# Patient Record
Sex: MALE | Race: WHITE | Employment: OTHER | URBAN - METROPOLITAN AREA
[De-identification: names, ages, dates, MRNs, and addresses within clinical notes are randomized per-mention and may not be internally consistent; named-entity substitution may affect disease eponyms.]

---

## 2017-04-27 ENCOUNTER — PATIENT MESSAGE (OUTPATIENT)
Dept: INTERNAL MEDICINE CLINIC | Facility: CLINIC | Age: 57
End: 2017-04-27

## 2017-04-28 RX ORDER — LISINOPRIL 10 MG/1
10 TABLET ORAL DAILY
Qty: 30 TABLET | Refills: 3 | Status: SHIPPED | OUTPATIENT
Start: 2017-04-28 | End: 2017-10-04

## 2017-04-28 NOTE — TELEPHONE ENCOUNTER
Hypertensive Medications  Protocol Criteria:  · Appointment scheduled in the past 6 months or in the next 3 months  · BMP or CMP in the past 12 months  · Creatinine result < 2  Recent Visits       Provider Department Primary Dx    4 months ago Julius Bone

## 2017-04-28 NOTE — TELEPHONE ENCOUNTER
From: Shashi Query  To: Leslee Chavez MD  Sent: 4/27/2017 11:23 AM CDT  Subject: Prescription Question    HI. I am in need of a refill of Lisinopril 10mg.  its working well at [id number]

## 2017-10-04 ENCOUNTER — OFFICE VISIT (OUTPATIENT)
Dept: INTERNAL MEDICINE CLINIC | Facility: CLINIC | Age: 57
End: 2017-10-04

## 2017-10-04 VITALS
WEIGHT: 237 LBS | BODY MASS INDEX: 31.75 KG/M2 | HEART RATE: 56 BPM | TEMPERATURE: 98 F | SYSTOLIC BLOOD PRESSURE: 128 MMHG | DIASTOLIC BLOOD PRESSURE: 82 MMHG | HEIGHT: 72.5 IN

## 2017-10-04 DIAGNOSIS — E78.5 DYSLIPIDEMIA: ICD-10-CM

## 2017-10-04 DIAGNOSIS — Z00.00 ANNUAL PHYSICAL EXAM: Primary | ICD-10-CM

## 2017-10-04 DIAGNOSIS — I10 ESSENTIAL HYPERTENSION: ICD-10-CM

## 2017-10-04 DIAGNOSIS — K21.9 GASTROESOPHAGEAL REFLUX DISEASE, ESOPHAGITIS PRESENCE NOT SPECIFIED: ICD-10-CM

## 2017-10-04 DIAGNOSIS — D12.6 ADENOMATOUS POLYP OF COLON, UNSPECIFIED PART OF COLON: ICD-10-CM

## 2017-10-04 PROCEDURE — 90686 IIV4 VACC NO PRSV 0.5 ML IM: CPT | Performed by: INTERNAL MEDICINE

## 2017-10-04 PROCEDURE — 99396 PREV VISIT EST AGE 40-64: CPT | Performed by: INTERNAL MEDICINE

## 2017-10-04 PROCEDURE — 90472 IMMUNIZATION ADMIN EACH ADD: CPT | Performed by: INTERNAL MEDICINE

## 2017-10-04 PROCEDURE — 90471 IMMUNIZATION ADMIN: CPT | Performed by: INTERNAL MEDICINE

## 2017-10-04 PROCEDURE — 90715 TDAP VACCINE 7 YRS/> IM: CPT | Performed by: INTERNAL MEDICINE

## 2017-10-04 RX ORDER — LISINOPRIL 10 MG/1
10 TABLET ORAL DAILY
Qty: 30 TABLET | Refills: 5 | Status: SHIPPED | OUTPATIENT
Start: 2017-10-04 | End: 2018-02-23 | Stop reason: ALTCHOICE

## 2017-10-04 NOTE — H&P
Zayda Zavala is a 61year old male who presents for a complete physical exam, as well as for follow-up of hypertension and hypercholesterolemia. HPI:   He feels well today. No specific issues for discussion.   He does express interest in [facility] [place] Father    • Heart Disease Father      Angina age 76s; CABG age 80   • Lung Cancer [OTHER] Father    • Parkinson's Pleas Client Mother    • Parkinson's Pleas Client Maternal Aunt    • Breast Cancer Paternal Grandmother       Social History:  Smoking status: Never Smo vaccine today. Check CMP CBC lipid profile and screening PSA when able. Order sent. Continue current medications. Prescription refill for 6 months of lisinopril sent to pharmacy. Refer to GI for repeat colonoscopy. Referral completed.   He will schedu

## 2017-10-04 NOTE — PATIENT INSTRUCTIONS
You received a Tdap vaccine and a flu shot today. Please continue your current medications. Please obtain blood tests soon when you can. Please schedule a repeat colonoscopy with Dr. Russell Ortiz. Please try to eat healthy and exercise regularly.   Return visi

## 2017-10-31 ENCOUNTER — APPOINTMENT (OUTPATIENT)
Dept: LAB | Facility: HOSPITAL | Age: 57
End: 2017-10-31
Attending: INTERNAL MEDICINE
Payer: MEDICAID

## 2017-10-31 DIAGNOSIS — Z00.00 ANNUAL PHYSICAL EXAM: ICD-10-CM

## 2017-10-31 PROCEDURE — 80053 COMPREHEN METABOLIC PANEL: CPT

## 2017-10-31 PROCEDURE — 85027 COMPLETE CBC AUTOMATED: CPT

## 2017-10-31 PROCEDURE — 80061 LIPID PANEL: CPT

## 2017-10-31 PROCEDURE — 36415 COLL VENOUS BLD VENIPUNCTURE: CPT

## 2018-01-08 ENCOUNTER — TELEPHONE (OUTPATIENT)
Dept: GASTROENTEROLOGY | Facility: CLINIC | Age: 58
End: 2018-01-08

## 2018-01-08 NOTE — TELEPHONE ENCOUNTER
Pt called me back. Does have worsening heartburn/ reflux symptoms. Along with chronic cough.   Takes OTC Tagament daily with continued breakthrough symptoms    Okay to schedule colonoscopy and EGD together or did you want to see pt in office first? Please

## 2018-01-08 NOTE — TELEPHONE ENCOUNTER
Pt requesting to schedule EGD and CLN, pt informed about the 72 hr cb, pls call at:[phone],thanks.

## 2018-01-08 NOTE — TELEPHONE ENCOUNTER
LMTCB to discuss need for EGD. Is he having upper GI symptoms? Last Procedure:  5/13/2010  Last Diagnosis:  History of colon polyps  Recalled for (years): 5 years  Sedation used previously:  IV sedation  Last Prep Used (if known):   Moviprep  Anticoag removal.  Retroflexion was performed in the body of the stomach and the  more proximal stomach examined. The patient tolerated both procedures  well. ASA class was II. Findings as noted below. Colonoscopy.   1. Diverticulosis, extensive wide-mouthed d

## 2018-01-09 NOTE — TELEPHONE ENCOUNTER
Nursing: Just to clarify, the patient is or is not having reflux symptoms? If he is not having breakthrough symptoms with OTC Tagamet, then why was he requesting an EGD?

## 2018-01-09 NOTE — H&P
[address] [facility] [address] Gastroenterology                                                                                                  Clinic History and Physical     Pa melena. Last colonoscopy was performed by Dr. Nader Benítez in May 2010 along with the EGD. Findings indicated below. Patient's melena may have been possibly from an upper GI source (small duodenal ulcer) however no obvious etiology is forthcoming.   No evid Parkinson's Nelida Dsouza Mother    • Parkinson's Nelida Dsouza Maternal Aunt    • Breast Cancer Paternal Grandmother       Social History: Smoking status: Never Smoker                                                              Smokeless tobacco: Never Used perfused   Lung: effort normal and breath sounds normal, no respiratory distress, wheezes or rales  GI: + Mild epigastric/left upper quadrant tenderness to deep palpation, soft, obese non-tender exam in all other quadrants without rigidity or guarding, non modifications which she is also amenable to. Patient will follow up with any new or worsening symptoms. 2.  Screening for colon cancer/history of colon polyps: Patient's last colonoscopy was in 2010. He was due for a 5 year recall in 2015.   In light o with colonoscopy with intervention [i.e. polypectomy, stent placement, etc.] as indicated. Orders This Visit:  No orders of the defined types were placed in this encounter.       Meds This Visit:    Signed Prescriptions Disp Refills    Na Sulfate-K S

## 2018-01-09 NOTE — TELEPHONE ENCOUNTER
Spoke with pt and reviewed need for office visit prior to schedule    appt made tomorrow with Karel Kurtz for evaluation.

## 2018-01-09 NOTE — TELEPHONE ENCOUNTER
Since the patient is having upper GI symptoms I should probably see him in office rather than just scheduling an EGD. He may not necessarily need the EGD pending evaluation. We can schedule him for endoscopic procedure while he is in office at that time.

## 2018-01-10 ENCOUNTER — TELEPHONE (OUTPATIENT)
Dept: GASTROENTEROLOGY | Facility: CLINIC | Age: 58
End: 2018-01-10

## 2018-01-10 ENCOUNTER — OFFICE VISIT (OUTPATIENT)
Dept: GASTROENTEROLOGY | Facility: CLINIC | Age: 58
End: 2018-01-10

## 2018-01-10 VITALS
HEART RATE: 83 BPM | DIASTOLIC BLOOD PRESSURE: 100 MMHG | SYSTOLIC BLOOD PRESSURE: 150 MMHG | BODY MASS INDEX: 31.49 KG/M2 | WEIGHT: 237.63 LBS | HEIGHT: 73 IN

## 2018-01-10 DIAGNOSIS — R10.13 EPIGASTRIC PAIN: ICD-10-CM

## 2018-01-10 DIAGNOSIS — Z12.11 SCREENING FOR COLON CANCER: Primary | ICD-10-CM

## 2018-01-10 DIAGNOSIS — Z86.010 HISTORY OF COLON POLYPS: ICD-10-CM

## 2018-01-10 DIAGNOSIS — K21.9 GASTROESOPHAGEAL REFLUX DISEASE WITHOUT ESOPHAGITIS: ICD-10-CM

## 2018-01-10 DIAGNOSIS — R10.9 LEFT SIDED ABDOMINAL PAIN: ICD-10-CM

## 2018-01-10 PROCEDURE — 99214 OFFICE O/P EST MOD 30 MIN: CPT | Performed by: NURSE PRACTITIONER

## 2018-01-10 PROCEDURE — 99212 OFFICE O/P EST SF 10 MIN: CPT | Performed by: NURSE PRACTITIONER

## 2018-01-10 RX ORDER — NICOTINE POLACRILEX 4 MG/1
1 GUM, CHEWING ORAL DAILY
Qty: 30 TABLET | Refills: 5 | Status: SHIPPED | OUTPATIENT
Start: 2018-01-10 | End: 2020-11-20

## 2018-01-10 NOTE — PATIENT INSTRUCTIONS
1. Schedule colonoscopy with Dr. Kasia Stout with MAC    2.  bowel prep from pharmacy - split dose Suprep    3. Continue all medications for procedure     4. Read all bowel prep instructions carefully    5.  AVOID seeds, nuts, popcorn, raw fruits and veget

## 2018-01-10 NOTE — PROGRESS NOTES
no h/o head & neck cancers  no h/o difficult intubations  no h/o malignant hyperthermia  no psuedocholinesterase deficiencies  no h/o active illicit drug use/opioid abuse  no home O2  no h/o sleep apnea (might have it)  no active alcoholics   no dialysis

## 2018-01-10 NOTE — TELEPHONE ENCOUNTER
Scheduled for:  Colonoscopy 43321  Provider Name: Dr. Mason Burkett  Date:  3/5/18  Location:  [facility]  Sedation:  MAC  Time:  1:30 pm, arrival 12:30 pm  Prep: Suprep  Meds/Allergies Reconciled?:  SUSY Abrams reviewed  Diagnosis with codes:  Screening for colon c

## 2018-02-19 ENCOUNTER — TELEPHONE (OUTPATIENT)
Dept: GASTROENTEROLOGY | Facility: CLINIC | Age: 58
End: 2018-02-19

## 2018-02-19 NOTE — TELEPHONE ENCOUNTER
Current Outpatient Prescriptions:  Na Sulfate-K Sulfate-Mg Sulf (SUPREP BOWEL PREP KIT) 17.5-3.13-1.6 GM/180ML Oral Solution Take as directed per GI consult notes Disp: 1 Bottle Rfl: 0     Med not covered pls consider Alt.  pls advise

## 2018-02-19 NOTE — TELEPHONE ENCOUNTER
Spoke to Rhonda Church from pharmacy informed per Souleymane ROSALESN 1/10/18 Order ok to substitute with generic and okay to substitute Colyte/trilyte or equivalent as necessary. Verbalized understanding and had no further questions at this time.

## 2018-02-23 ENCOUNTER — OFFICE VISIT (OUTPATIENT)
Dept: INTERNAL MEDICINE CLINIC | Facility: CLINIC | Age: 58
End: 2018-02-23

## 2018-02-23 VITALS
BODY MASS INDEX: 32.07 KG/M2 | HEART RATE: 60 BPM | DIASTOLIC BLOOD PRESSURE: 84 MMHG | WEIGHT: 242 LBS | HEIGHT: 73 IN | SYSTOLIC BLOOD PRESSURE: 166 MMHG

## 2018-02-23 DIAGNOSIS — I10 ESSENTIAL HYPERTENSION: ICD-10-CM

## 2018-02-23 DIAGNOSIS — E78.5 DYSLIPIDEMIA: ICD-10-CM

## 2018-02-23 DIAGNOSIS — M77.12 LEFT LATERAL EPICONDYLITIS: Primary | ICD-10-CM

## 2018-02-23 PROCEDURE — 99213 OFFICE O/P EST LOW 20 MIN: CPT | Performed by: INTERNAL MEDICINE

## 2018-02-23 PROCEDURE — 99212 OFFICE O/P EST SF 10 MIN: CPT | Performed by: INTERNAL MEDICINE

## 2018-02-23 RX ORDER — LISINOPRIL AND HYDROCHLOROTHIAZIDE 20; 12.5 MG/1; MG/1
1 TABLET ORAL DAILY
Qty: 30 TABLET | Refills: 3 | Status: SHIPPED | OUTPATIENT
Start: 2018-02-23 | End: 2018-07-23

## 2018-02-23 RX ORDER — ATORVASTATIN CALCIUM 40 MG/1
40 TABLET, FILM COATED ORAL NIGHTLY
Qty: 30 TABLET | Refills: 3 | Status: SHIPPED | OUTPATIENT
Start: 2018-02-23 | End: 2018-08-15

## 2018-02-23 NOTE — PROGRESS NOTES
Keerthi Connell is a 61year old male.  Patient presents with:  Elbow Pain: c/o on/off left elbow pain since the end of November 2017    HPI:   Since this past November, he has had persistent intermittent pain in his left lateral elbow that typicall Smoker                                                              Smokeless tobacco: Never Used                      Alcohol use: Yes           0.0 oz/week     Comment: 6+ beers 3 days a week       EXAM:   GENERAL: Pleasant male appearing well in no dist

## 2018-02-23 NOTE — PATIENT INSTRUCTIONS
Please rest and apply ice to your left elbow 3 times daily. Call if no better. Stop lisinopril and begin lisinopril/HCTZ 20/12.5 mg 1 pill daily. Begin atorvastatin 40 mg 1 pill daily. Return visit in 1 month for a blood pressure check.

## 2018-03-05 ENCOUNTER — ANESTHESIA (OUTPATIENT)
Dept: ENDOSCOPY | Facility: HOSPITAL | Age: 58
End: 2018-03-05

## 2018-03-05 ENCOUNTER — HOSPITAL ENCOUNTER (OUTPATIENT)
Facility: HOSPITAL | Age: 58
Setting detail: HOSPITAL OUTPATIENT SURGERY
Discharge: HOME | End: 2018-03-05
Attending: INTERNAL MEDICINE | Admitting: INTERNAL MEDICINE
Payer: MEDICAID

## 2018-03-05 ENCOUNTER — SURGERY (OUTPATIENT)
Age: 58
End: 2018-03-05

## 2018-03-05 ENCOUNTER — ANESTHESIA EVENT (OUTPATIENT)
Dept: ENDOSCOPY | Facility: HOSPITAL | Age: 58
End: 2018-03-05

## 2018-03-05 VITALS
HEIGHT: 72 IN | WEIGHT: 235 LBS | BODY MASS INDEX: 31.83 KG/M2 | DIASTOLIC BLOOD PRESSURE: 90 MMHG | SYSTOLIC BLOOD PRESSURE: 124 MMHG | RESPIRATION RATE: 15 BRPM | OXYGEN SATURATION: 96 % | HEART RATE: 74 BPM

## 2018-03-05 DIAGNOSIS — Z86.010 HISTORY OF COLON POLYPS: ICD-10-CM

## 2018-03-05 DIAGNOSIS — Z12.11 SCREENING FOR COLON CANCER: ICD-10-CM

## 2018-03-05 DIAGNOSIS — K57.90 DIVERTICULOSIS: Primary | ICD-10-CM

## 2018-03-05 DIAGNOSIS — K64.9 HEMORRHOIDS: ICD-10-CM

## 2018-03-05 DIAGNOSIS — K63.5 COLON POLYPS: ICD-10-CM

## 2018-03-05 PROCEDURE — 0DBK8ZX EXCISION OF ASCENDING COLON, VIA NATURAL OR ARTIFICIAL OPENING ENDOSCOPIC, DIAGNOSTIC: ICD-10-PCS | Performed by: INTERNAL MEDICINE

## 2018-03-05 PROCEDURE — 45380 COLONOSCOPY AND BIOPSY: CPT | Performed by: INTERNAL MEDICINE

## 2018-03-05 PROCEDURE — 0DBL8ZX EXCISION OF TRANSVERSE COLON, VIA NATURAL OR ARTIFICIAL OPENING ENDOSCOPIC, DIAGNOSTIC: ICD-10-PCS | Performed by: INTERNAL MEDICINE

## 2018-03-05 PROCEDURE — 0DBN8ZX EXCISION OF SIGMOID COLON, VIA NATURAL OR ARTIFICIAL OPENING ENDOSCOPIC, DIAGNOSTIC: ICD-10-PCS | Performed by: INTERNAL MEDICINE

## 2018-03-05 RX ORDER — LIDOCAINE HYDROCHLORIDE 10 MG/ML
INJECTION, SOLUTION EPIDURAL; INFILTRATION; INTRACAUDAL; PERINEURAL AS NEEDED
Status: DISCONTINUED | OUTPATIENT
Start: 2018-03-05 | End: 2018-03-05 | Stop reason: SURG

## 2018-03-05 RX ORDER — SODIUM CHLORIDE, SODIUM LACTATE, POTASSIUM CHLORIDE, CALCIUM CHLORIDE 600; 310; 30; 20 MG/100ML; MG/100ML; MG/100ML; MG/100ML
INJECTION, SOLUTION INTRAVENOUS CONTINUOUS
Status: DISCONTINUED | OUTPATIENT
Start: 2018-03-05 | End: 2018-03-05

## 2018-03-05 RX ADMIN — SODIUM CHLORIDE, SODIUM LACTATE, POTASSIUM CHLORIDE, CALCIUM CHLORIDE: 600; 310; 30; 20 INJECTION, SOLUTION INTRAVENOUS at 14:45:00

## 2018-03-05 RX ADMIN — SODIUM CHLORIDE, SODIUM LACTATE, POTASSIUM CHLORIDE, CALCIUM CHLORIDE: 600; 310; 30; 20 INJECTION, SOLUTION INTRAVENOUS at 14:05:00

## 2018-03-05 RX ADMIN — LIDOCAINE HYDROCHLORIDE 50 MG: 10 INJECTION, SOLUTION EPIDURAL; INFILTRATION; INTRACAUDAL; PERINEURAL at 14:05:00

## 2018-03-05 NOTE — ANESTHESIA PREPROCEDURE EVALUATION
Anesthesia PreOp Note    HPI:     Niru Bustillos is a 61year old male who presents for preoperative consultation requested by: Jones Bosworth, MD    Date of Surgery: 3/5/2018    Procedure(s):  COLONOSCOPY  Indication: Screening for colon cancer Hives  Radiology Contrast *    Vida Donovan    Family History   Problem Relation Age of Onset   • Diabetes Father    • Hypertension Father    • Heart Disease Father      Angina age 76s; CABG age 80   • Lung Cancer [OTHER] Father    • Parkinson's [OT PM

## 2018-03-05 NOTE — OPERATIVE REPORT
[facility] HOSP - [facility] Endoscopy Report      Preoperative Diagnosis:  - history of colon polyps       Postoperative Diagnosis:  - colon polyps x 3  - diverticulosis  - internal hemorrhoids      Procedure:    Colonoscopy      Surgeon:  JEANIE Wetzel

## 2018-03-05 NOTE — ANESTHESIA POSTPROCEDURE EVALUATION
Patient: Edenilson Blood    Procedure Summary     Date:  03/05/18 Room / Location:  [address] [place] [address] ENDOSCOPY 01 / [address] [place] [address] ENDOSCOPY    Anesthesia Start:  6621 Anesthesia Stop:  8958    Procedure:  COLONOSCOPY (N/A ) Diagnosis:       Screening for colon cancer

## 2018-03-14 ENCOUNTER — TELEPHONE (OUTPATIENT)
Dept: GASTROENTEROLOGY | Facility: CLINIC | Age: 58
End: 2018-03-14

## 2018-03-14 NOTE — TELEPHONE ENCOUNTER
----- Message from Suha Doyle MD sent at 3/13/2018  5:58 PM CDT -----  I wanted to get back to you with your colonoscopy results. You had 3 colon polyps removed which were benign.   I would advise a repeat colonoscopy in 5 years to make sure no new p

## 2018-06-25 ENCOUNTER — TELEPHONE (OUTPATIENT)
Dept: GASTROENTEROLOGY | Facility: CLINIC | Age: 58
End: 2018-06-25

## 2018-06-25 NOTE — TELEPHONE ENCOUNTER
Needs confirmation on whether the below medication is tablet or capsule please call thomas [phone] select option 4 thank you     Current Outpatient Prescriptions:     •  Omeprazole 20 MG Oral Tab EC, Take 1 tablet by mouth daily. , Disp: 30 tablet, Rfl

## 2018-06-25 NOTE — TELEPHONE ENCOUNTER
Samira JAIN--I spoke to Nikita at [facility] has been taking Omeprazole capsules rather than tablets (your Ad rx). I ok'd them to use capsules for authorizing 20 mg daily.  Apparently they have the prior auth request. The issue is pt insurance

## 2018-06-25 NOTE — TELEPHONE ENCOUNTER
Current Outpatient Prescriptions:  Omeprazole 20 MG Oral Tab EC Take 1 tablet by mouth daily.  Disp: 30 tablet Rfl: 5     PA request pls call [phone] Pt ID# [id number] Max 120 day supply in 365 days

## 2018-06-26 NOTE — TELEPHONE ENCOUNTER
Nursing: We could try to appeal see if they would provide coverage. In the event they do not, omeprazole 20 mg daily is the equivalent of Prilosec OTC which the patient may obtain over-the-counter.   We may want to reach out to the patient to explain the s

## 2018-06-26 NOTE — TELEPHONE ENCOUNTER
Received fax from [employer] stating that Omeprazole was denied due to the allowed 120 caps in a 365 day period. Lauryn Ulloa- please advise. Can try to appeal if needed. Thank you.

## 2018-06-27 NOTE — TELEPHONE ENCOUNTER
I left voicemail on the listed home phone to call. Attempted the mobile number but an elderly woman answered, and said he was not there. Will review below with pt. If he does want an appeal, this can sometimes take weeks.  May pay out of pocket for Prilos

## 2018-06-28 NOTE — TELEPHONE ENCOUNTER
Pt contacted. He will just buy over the counter prilosec until next year, then we can start over with new rx at that time. Thanks, You may close encounter. Also sent to [facility] as FYI, if you have insurance paperwork, may send to scan. Thanks.

## 2018-07-23 RX ORDER — LISINOPRIL AND HYDROCHLOROTHIAZIDE 20; 12.5 MG/1; MG/1
1 TABLET ORAL DAILY
Qty: 90 TABLET | Refills: 0 | Status: SHIPPED | OUTPATIENT
Start: 2018-07-23 | End: 2018-11-04

## 2018-07-23 NOTE — TELEPHONE ENCOUNTER
Hypertensive Medications Protocol Passed7/23 1:45 PM   CMP or BMP in past 12 months    Serum Creatinine < 2.0    Appointment in past 6 or next 3 months     Medication refilled for 90 days per protocol.

## 2018-07-23 NOTE — TELEPHONE ENCOUNTER
From: Pamela Boydo  Sent: 7/23/2018 10:32 AM CDT  Subject: Medication Renewal Request    Jil Mehta.  Jose Rose would like a refill of the following medications:     Lisinopril-Hydrochlorothiazide 20-12.5 MG Oral Tab Esther Reyez MD]    Pre

## 2018-08-15 ENCOUNTER — OFFICE VISIT (OUTPATIENT)
Dept: INTERNAL MEDICINE CLINIC | Facility: CLINIC | Age: 58
End: 2018-08-15
Payer: MEDICAID

## 2018-08-15 ENCOUNTER — LAB ENCOUNTER (OUTPATIENT)
Dept: LAB | Facility: HOSPITAL | Age: 58
End: 2018-08-15
Attending: INTERNAL MEDICINE
Payer: MEDICAID

## 2018-08-15 VITALS
DIASTOLIC BLOOD PRESSURE: 76 MMHG | TEMPERATURE: 100 F | WEIGHT: 238 LBS | SYSTOLIC BLOOD PRESSURE: 124 MMHG | HEIGHT: 73 IN | BODY MASS INDEX: 31.54 KG/M2 | HEART RATE: 99 BPM

## 2018-08-15 DIAGNOSIS — R50.9 FEVER, UNSPECIFIED FEVER CAUSE: ICD-10-CM

## 2018-08-15 DIAGNOSIS — R50.9 FEVER, UNSPECIFIED FEVER CAUSE: Primary | ICD-10-CM

## 2018-08-15 LAB
BASOPHILS # BLD: 0 K/UL (ref 0–0.2)
BASOPHILS NFR BLD: 0 %
BILIRUB UR QL: NEGATIVE
CLARITY UR: CLEAR
COLOR UR: YELLOW
CRP SERPL-MCNC: 9.3 MG/DL (ref 0–0.9)
EOSINOPHIL # BLD: 0 K/UL (ref 0–0.7)
EOSINOPHIL NFR BLD: 0 %
ERYTHROCYTE [DISTWIDTH] IN BLOOD BY AUTOMATED COUNT: 12.2 % (ref 11–15)
ERYTHROCYTE [SEDIMENTATION RATE] IN BLOOD: 7 MM/HR (ref 0–20)
GLUCOSE UR-MCNC: NEGATIVE MG/DL
HCT VFR BLD AUTO: 50.8 % (ref 41–52)
HGB BLD-MCNC: 17.3 G/DL (ref 13.5–17.5)
HGB UR QL STRIP.AUTO: NEGATIVE
LEUKOCYTE ESTERASE UR QL STRIP.AUTO: NEGATIVE
LYMPHOCYTES # BLD: 1.1 K/UL (ref 1–4)
LYMPHOCYTES NFR BLD: 9 %
MCH RBC QN AUTO: 32.4 PG (ref 27–32)
MCHC RBC AUTO-ENTMCNC: 34.1 G/DL (ref 32–37)
MCV RBC AUTO: 95 FL (ref 80–100)
MONOCYTES # BLD: 1 K/UL (ref 0–1)
MONOCYTES NFR BLD: 9 %
NEUTROPHILS # BLD AUTO: 9.1 K/UL (ref 1.8–7.7)
NEUTROPHILS NFR BLD: 81 %
NITRITE UR QL STRIP.AUTO: NEGATIVE
PH UR: 6 [PH] (ref 5–8)
PLATELET # BLD AUTO: 156 K/UL (ref 140–400)
PMV BLD AUTO: 8.7 FL (ref 7.4–10.3)
PROT UR-MCNC: NEGATIVE MG/DL
RBC # BLD AUTO: 5.34 M/UL (ref 4.5–5.9)
SP GR UR STRIP: 1.02 (ref 1–1.03)
UROBILINOGEN UR STRIP-ACNC: 2
VIT C UR-MCNC: NEGATIVE MG/DL
WBC # BLD AUTO: 11.2 K/UL (ref 4–11)

## 2018-08-15 PROCEDURE — 99214 OFFICE O/P EST MOD 30 MIN: CPT | Performed by: INTERNAL MEDICINE

## 2018-08-15 PROCEDURE — 36415 COLL VENOUS BLD VENIPUNCTURE: CPT

## 2018-08-15 PROCEDURE — 99212 OFFICE O/P EST SF 10 MIN: CPT | Performed by: INTERNAL MEDICINE

## 2018-08-15 PROCEDURE — 81003 URINALYSIS AUTO W/O SCOPE: CPT

## 2018-08-15 PROCEDURE — 85652 RBC SED RATE AUTOMATED: CPT

## 2018-08-15 PROCEDURE — 85025 COMPLETE CBC W/AUTO DIFF WBC: CPT

## 2018-08-15 PROCEDURE — 86140 C-REACTIVE PROTEIN: CPT

## 2018-08-15 NOTE — PATIENT INSTRUCTIONS
Await results of today's blood and urine testing. Take Tylenol as needed for relief of achiness and fever. Call if symptoms do not continue to improve or resolve. Please schedule a physical in 2 months.

## 2018-08-15 NOTE — PROGRESS NOTES
De Ruba is a 60year old male. Patient presents with:  Fever  Low Back Pain    HPI:   On Sunday, 3 days ago, he went for a long bike ride. Afterward he felt some excessive fatigue and achiness.   Symptoms persisted, and yesterday he had a f REVIEW OF SYSTEMS:   GENERAL: No fever  URI: No nasal congestion, purulent nasal drainage, sinus pressure or pain, ear pain or sore throat  LUNGS: No cough wheezing or shortness of breath  CARDIAC: No lightheadedness palpitations or chest pain  GI: Occ to the plan. The patient is asked to return in 2 months.     Stewart Spears MD  8/15/2018  12:51 PM

## 2018-08-18 ENCOUNTER — OFFICE VISIT (OUTPATIENT)
Dept: INTERNAL MEDICINE CLINIC | Facility: CLINIC | Age: 58
End: 2018-08-18
Payer: MEDICAID

## 2018-08-18 VITALS
DIASTOLIC BLOOD PRESSURE: 87 MMHG | TEMPERATURE: 98 F | SYSTOLIC BLOOD PRESSURE: 127 MMHG | WEIGHT: 232 LBS | HEIGHT: 72.5 IN | BODY MASS INDEX: 31.08 KG/M2 | HEART RATE: 103 BPM

## 2018-08-18 DIAGNOSIS — R50.9 FEVER, UNSPECIFIED FEVER CAUSE: Primary | ICD-10-CM

## 2018-08-18 PROCEDURE — 99213 OFFICE O/P EST LOW 20 MIN: CPT | Performed by: INTERNAL MEDICINE

## 2018-08-18 PROCEDURE — 99212 OFFICE O/P EST SF 10 MIN: CPT | Performed by: INTERNAL MEDICINE

## 2018-08-18 NOTE — PROGRESS NOTES
Kimani Razo is a 60year old male.  Patient presents with:  Fever: started last monday  Headache: started last wednesday-pt has been taking tylenol reduces fever but does not help headache    HPI:   Mr. Ab Valentino presents this morning for fo Past Surgical History:  2010: COLONOSCOPY      Comment: 4th colon; Dr. Komal Snyder  No date: COLONOSCOPY  3/5/2018: COLONOSCOPY N/A      Comment: Procedure: COLONOSCOPY;  Surgeon: Dre Hernández MD;  Location: [address] [place] [address] ENDOSCOPY   Social History status. The patient indicates understanding of these issues and agrees to the plan. The patient is asked to return as needed.     Amna Molina MD  8/18/2018  11:15 AM

## 2018-08-18 NOTE — PATIENT INSTRUCTIONS
Please monitor symptoms very closely for the next 2-3 days. Go to the ER for evaluation of possible meningitis if fever, headache and/or neck stiffness recurs. Contact me through MyChart in 2-3 days for an update.

## 2018-11-05 RX ORDER — LISINOPRIL AND HYDROCHLOROTHIAZIDE 20; 12.5 MG/1; MG/1
1 TABLET ORAL DAILY
Qty: 90 TABLET | Refills: 0 | Status: SHIPPED | OUTPATIENT
Start: 2018-11-05 | End: 2019-02-05

## 2018-11-06 NOTE — TELEPHONE ENCOUNTER
Please contact patient and assist in scheduling appointment with Dr Sindy Hewitt for  physical exam.

## 2019-01-20 ENCOUNTER — HOSPITAL ENCOUNTER (INPATIENT)
Facility: HOSPITAL | Age: 59
LOS: 4 days | Discharge: HOME | DRG: 378 | End: 2019-01-24
Attending: EMERGENCY MEDICINE | Admitting: HOSPITALIST
Payer: MEDICAID

## 2019-01-20 DIAGNOSIS — K57.90 DIVERTICULOSIS: ICD-10-CM

## 2019-01-20 DIAGNOSIS — K64.9 HEMORRHOID: ICD-10-CM

## 2019-01-20 DIAGNOSIS — K92.1 MELENA: ICD-10-CM

## 2019-01-20 DIAGNOSIS — K92.2 ACUTE UPPER GASTROINTESTINAL BLEEDING: Primary | ICD-10-CM

## 2019-01-20 LAB
ANION GAP SERPL CALC-SCNC: 15 MMOL/L (ref 0–18)
ANTIBODY SCREEN: NEGATIVE
BASOPHILS # BLD: 0 K/UL (ref 0–0.2)
BASOPHILS NFR BLD: 0 %
BUN SERPL-MCNC: 19 MG/DL (ref 8–20)
BUN/CREAT SERPL: 22.1 (ref 10–20)
CALCIUM SERPL-MCNC: 9.5 MG/DL (ref 8.5–10.5)
CHLORIDE SERPL-SCNC: 93 MMOL/L (ref 95–110)
CO2 SERPL-SCNC: 25 MMOL/L (ref 22–32)
CREAT SERPL-MCNC: 0.86 MG/DL (ref 0.5–1.5)
EOSINOPHIL # BLD: 0.1 K/UL (ref 0–0.7)
EOSINOPHIL NFR BLD: 1 %
ERYTHROCYTE [DISTWIDTH] IN BLOOD BY AUTOMATED COUNT: 12.4 % (ref 11–15)
GLUCOSE SERPL-MCNC: 128 MG/DL (ref 70–99)
HCT VFR BLD AUTO: 44.8 % (ref 41–52)
HGB BLD-MCNC: 15.8 G/DL (ref 13.5–17.5)
LYMPHOCYTES # BLD: 1.7 K/UL (ref 1–4)
LYMPHOCYTES NFR BLD: 16 %
MCH RBC QN AUTO: 32.6 PG (ref 27–32)
MCHC RBC AUTO-ENTMCNC: 35.2 G/DL (ref 32–37)
MCV RBC AUTO: 92.7 FL (ref 80–100)
MONOCYTES # BLD: 0.7 K/UL (ref 0–1)
MONOCYTES NFR BLD: 6 %
NEUTROPHILS # BLD AUTO: 8.3 K/UL (ref 1.8–7.7)
NEUTROPHILS NFR BLD: 77 %
OSMOLALITY UR CALC.SUM OF ELEC: 280 MOSM/KG (ref 275–295)
PLATELET # BLD AUTO: 247 K/UL (ref 140–400)
PMV BLD AUTO: 8.9 FL (ref 7.4–10.3)
POTASSIUM SERPL-SCNC: 4.4 MMOL/L (ref 3.3–5.1)
RBC # BLD AUTO: 4.83 M/UL (ref 4.5–5.9)
RH BLOOD TYPE: POSITIVE
SODIUM SERPL-SCNC: 133 MMOL/L (ref 136–144)
WBC # BLD AUTO: 10.7 K/UL (ref 4–11)

## 2019-01-20 PROCEDURE — 99222 1ST HOSP IP/OBS MODERATE 55: CPT | Performed by: HOSPITALIST

## 2019-01-20 PROCEDURE — 99223 1ST HOSP IP/OBS HIGH 75: CPT | Performed by: INTERNAL MEDICINE

## 2019-01-20 RX ORDER — HYDRALAZINE HYDROCHLORIDE 20 MG/ML
10 INJECTION INTRAMUSCULAR; INTRAVENOUS EVERY 4 HOURS PRN
Status: DISCONTINUED | OUTPATIENT
Start: 2019-01-20 | End: 2019-01-24

## 2019-01-20 RX ORDER — LORAZEPAM 2 MG/ML
1 INJECTION INTRAMUSCULAR NIGHTLY PRN
Status: DISCONTINUED | OUTPATIENT
Start: 2019-01-20 | End: 2019-01-24

## 2019-01-20 RX ORDER — 0.9 % SODIUM CHLORIDE 0.9 %
3 VIAL (ML) INJECTION AS NEEDED
Status: DISCONTINUED | OUTPATIENT
Start: 2019-01-20 | End: 2019-01-24

## 2019-01-20 RX ORDER — ZOLPIDEM TARTRATE 5 MG/1
5 TABLET ORAL NIGHTLY PRN
Status: DISCONTINUED | OUTPATIENT
Start: 2019-01-20 | End: 2019-01-24

## 2019-01-20 RX ORDER — SODIUM CHLORIDE 9 MG/ML
INJECTION, SOLUTION INTRAVENOUS CONTINUOUS
Status: DISCONTINUED | OUTPATIENT
Start: 2019-01-20 | End: 2019-01-24

## 2019-01-20 RX ORDER — LORAZEPAM 2 MG/ML
1 INJECTION INTRAMUSCULAR
Status: DISCONTINUED | OUTPATIENT
Start: 2019-01-20 | End: 2019-01-20

## 2019-01-20 RX ORDER — ONDANSETRON 2 MG/ML
4 INJECTION INTRAMUSCULAR; INTRAVENOUS EVERY 6 HOURS PRN
Status: DISCONTINUED | OUTPATIENT
Start: 2019-01-20 | End: 2019-01-24

## 2019-01-20 NOTE — ED NOTES
The patient reports formed, black and tarry stools for 3 days with fatigue and intermittent lightheadedness, denying shortness of breath or chest pain.

## 2019-01-20 NOTE — ED INITIAL ASSESSMENT (HPI)
Patient presents to ER with c/o dark stool twice a day for the last three days. Patient takes ASA - stopped on Thursday.

## 2019-01-21 ENCOUNTER — ANESTHESIA EVENT (OUTPATIENT)
Dept: ENDOSCOPY | Facility: HOSPITAL | Age: 59
DRG: 378 | End: 2019-01-21
Payer: MEDICAID

## 2019-01-21 ENCOUNTER — ANESTHESIA (OUTPATIENT)
Dept: ENDOSCOPY | Facility: HOSPITAL | Age: 59
DRG: 378 | End: 2019-01-21
Payer: MEDICAID

## 2019-01-21 LAB
ANION GAP SERPL CALC-SCNC: 11 MMOL/L (ref 0–18)
BASOPHILS # BLD: 0 K/UL (ref 0–0.2)
BASOPHILS NFR BLD: 0 %
BUN SERPL-MCNC: 16 MG/DL (ref 8–20)
BUN/CREAT SERPL: 19.5 (ref 10–20)
CALCIUM SERPL-MCNC: 8.4 MG/DL (ref 8.5–10.5)
CHLORIDE SERPL-SCNC: 97 MMOL/L (ref 95–110)
CO2 SERPL-SCNC: 25 MMOL/L (ref 22–32)
CREAT SERPL-MCNC: 0.82 MG/DL (ref 0.5–1.5)
EOSINOPHIL # BLD: 0.2 K/UL (ref 0–0.7)
EOSINOPHIL NFR BLD: 2 %
ERYTHROCYTE [DISTWIDTH] IN BLOOD BY AUTOMATED COUNT: 12.4 % (ref 11–15)
GLUCOSE SERPL-MCNC: 111 MG/DL (ref 70–99)
HCT VFR BLD AUTO: 35.9 % (ref 41–52)
HCT VFR BLD AUTO: 36.5 % (ref 41–52)
HGB BLD-MCNC: 12.6 G/DL (ref 13.5–17.5)
HGB BLD-MCNC: 12.9 G/DL (ref 13.5–17.5)
LYMPHOCYTES # BLD: 2.2 K/UL (ref 1–4)
LYMPHOCYTES NFR BLD: 29 %
MCH RBC QN AUTO: 32.8 PG (ref 27–32)
MCHC RBC AUTO-ENTMCNC: 35.3 G/DL (ref 32–37)
MCV RBC AUTO: 92.9 FL (ref 80–100)
MONOCYTES # BLD: 0.7 K/UL (ref 0–1)
MONOCYTES NFR BLD: 9 %
NEUTROPHILS # BLD AUTO: 4.5 K/UL (ref 1.8–7.7)
NEUTROPHILS NFR BLD: 60 %
OSMOLALITY UR CALC.SUM OF ELEC: 278 MOSM/KG (ref 275–295)
PLATELET # BLD AUTO: 175 K/UL (ref 140–400)
PMV BLD AUTO: 8.7 FL (ref 7.4–10.3)
POTASSIUM SERPL-SCNC: 4.6 MMOL/L (ref 3.3–5.1)
RBC # BLD AUTO: 3.93 M/UL (ref 4.5–5.9)
SODIUM SERPL-SCNC: 133 MMOL/L (ref 136–144)
WBC # BLD AUTO: 7.6 K/UL (ref 4–11)

## 2019-01-21 PROCEDURE — 99233 SBSQ HOSP IP/OBS HIGH 50: CPT | Performed by: HOSPITALIST

## 2019-01-21 PROCEDURE — 0DB68ZX EXCISION OF STOMACH, VIA NATURAL OR ARTIFICIAL OPENING ENDOSCOPIC, DIAGNOSTIC: ICD-10-PCS | Performed by: INTERNAL MEDICINE

## 2019-01-21 PROCEDURE — 43239 EGD BIOPSY SINGLE/MULTIPLE: CPT | Performed by: INTERNAL MEDICINE

## 2019-01-21 RX ORDER — SODIUM CHLORIDE, SODIUM LACTATE, POTASSIUM CHLORIDE, CALCIUM CHLORIDE 600; 310; 30; 20 MG/100ML; MG/100ML; MG/100ML; MG/100ML
INJECTION, SOLUTION INTRAVENOUS CONTINUOUS
Status: DISCONTINUED | OUTPATIENT
Start: 2019-01-21 | End: 2019-01-24

## 2019-01-21 RX ORDER — SODIUM CHLORIDE, SODIUM LACTATE, POTASSIUM CHLORIDE, CALCIUM CHLORIDE 600; 310; 30; 20 MG/100ML; MG/100ML; MG/100ML; MG/100ML
INJECTION, SOLUTION INTRAVENOUS CONTINUOUS PRN
Status: DISCONTINUED | OUTPATIENT
Start: 2019-01-21 | End: 2019-01-21

## 2019-01-21 RX ORDER — NALOXONE HYDROCHLORIDE 0.4 MG/ML
80 INJECTION, SOLUTION INTRAMUSCULAR; INTRAVENOUS; SUBCUTANEOUS AS NEEDED
Status: DISCONTINUED | OUTPATIENT
Start: 2019-01-21 | End: 2019-01-21 | Stop reason: HOSPADM

## 2019-01-21 RX ORDER — PANTOPRAZOLE SODIUM 40 MG/1
40 TABLET, DELAYED RELEASE ORAL
Status: DISCONTINUED | OUTPATIENT
Start: 2019-01-22 | End: 2019-01-24

## 2019-01-21 RX ADMIN — SODIUM CHLORIDE: 9 INJECTION, SOLUTION INTRAVENOUS at 10:15:00

## 2019-01-21 NOTE — ED PROVIDER NOTES
Patient Seen in: [facility] AND [facility] Emergency Department    History   Patient presents with:  GI Bleeding (gastrointestinal)    Stated Complaint: dark stool x 3 days    HPI    14-year-old male presents for complaint of black tarry stools for the past 3 da above.    Physical Exam     ED Triage Vitals [01/20/19 1626]   /83   Pulse 115   Resp 18   Temp 97 °F (36.1 °C)   Temp src Temporal   SpO2 98 %   O2 Device None (Room air)       Current:/89   Pulse 93   Temp 97 °F (36.1 °C) (Temporal)   Resp 20 on the individual orders. TYPE AND SCREEN   RAINBOW DRAW BLUE   RAINBOW DRAW LAVENDER   RAINBOW DRAW DARK GREEN   RAINBOW DRAW LIGHT GREEN   RAINBOW DRAW GOLD   RAINBOW DRAW LAVENDER TALL (BNP)                MDM    Patient is Hemoccult positive.   Stool

## 2019-01-21 NOTE — CONSULTS
[facility] AND CLINICS  Report of Consultation    Ariel Lezama Patient Status:  Inpatient    1960 MRN [id number]   Location [facility]5W Attending Smita Mondragon MD   Hosp Day # 0 PCP Gla Anders MD     Reason for Consultation:  - tab 5 mg, 5 mg, Oral, Nightly PRN  •  [START ON 1/21/2019] Pantoprazole Sodium (PROTONIX) 40 mg in Sodium Chloride 0.9 % 10 mL IV push, 40 mg, Intravenous, Q24H  •  LORazepam (ATIVAN) injection 1 mg, 1 mg, Intravenous, Nightly PRN    Physical Exam:  Blood

## 2019-01-21 NOTE — H&P
Crystal Patient Status:  Emergency    1960 MRN [id number]   Location [address] Attending [facility] Day # 0 PCP Esteban Segura MD Breast Cancer Paternal Grandmother       reports that  has never smoked. he has never used smokeless tobacco. He reports that he drinks alcohol. He reports that he does not use drugs.     Allergies:    Amoxicillin             HIVES  Radiology Contrast * edema. Gastrointestinal:  Soft, non-tender, non-distended, normal bowel sounds, no organomegaly. Lymphatics:  No lymphadenopathy neck, axilla, groin. Musculoskeletal: Normal range of motion. normal strength. Feet:  Normal pulses.   Neurologic:  Alert,

## 2019-01-21 NOTE — ANESTHESIA PREPROCEDURE EVALUATION
Anesthesia PreOp Note    HPI:     Cristin Gilliland is a 60year old male who presents for preoperative consultation requested by: Rere Downing MD    Date of Surgery: 1/20/2019 - 1/21/2019    Procedure(s):  ESOPHAGOGASTRODUODENOSCOPY (EGD)  Latonya Facility-Administered Medications Ordered in Epic:  ondansetron HCl (ZOFRAN) injection 4 mg 4 mg Intravenous Q6H PRN Estrellita Rasmussen MD    hydrALAzine HCl (APRESOLINE) injection 10 mg 10 mg Intravenous Q4H PRN Estrellita Rasmussen MD    0.9%  NaCl infusion  I activity: Not on file    Other Topics      Concerns:         Service: Not Asked        Blood Transfusions: Not Asked        Caffeine Concern: Yes          coffee, 2cups/day        Occupational Exposure: Not Asked        Hobby Hazards: Not Asked GI/Hepatic/Renal    (+) GERD,     Endo/Other    Abdominal  - normal exam  (+) obese,              Anesthesia Plan:   ASA:  3  Plan:   MAC  Informed Consent Plan and Risks Discussed With:  Patient      I have informed Glendale Darter and/or legal gu

## 2019-01-21 NOTE — ED NOTES
Orders for admission received from MD, and is safe for transfer to floor. Care will be assumed by [facility] per policy. Patient is aware of plan and ready to go upstairs. If there are any questions or issues, please call JM Adler  at extension [phone].  Hunter Bonner

## 2019-01-21 NOTE — H&P
History & Physical Examination    Patient Name: Victor M Carnes  MRN: [id number]  [facility]: [id number]  YOB: 1960    Diagnosis: melena and epigastric abdominal pain    Medications Prior to Admission:  Lisinopril-Hydrochlorothiazide 20-12.5 Father         Angina age 76s; CABG age 80   • Other (Lung Cancer) Father    • Other (Parkinson's) Mother    • Other (Parkinson's) Maternal Aunt    • Breast Cancer Paternal Grandmother      Social History    Tobacco Use      Smoking status: Never Smoker

## 2019-01-21 NOTE — OPERATIVE REPORT
[facility] - [facility] Endoscopy Report      Preoperative Diagnosis:  - black stools  - epigastric abdominal pain      Postoperative Diagnosis:  - mild gastritis   - gastric polyps ( fundic gland )  - small hiatal hernia      Procedure:    Esophagogast

## 2019-01-21 NOTE — ANESTHESIA POSTPROCEDURE EVALUATION
Patient: Arin Schwarz    Procedure Summary     Date:  01/21/19 Room / Location:  [facility] ENDOSCOPY 01 / [facility] ENDOSCOPY    Anesthesia Start:  1001 Anesthesia Stop:      Procedure:  ESOPHAGOGASTRODUODENOSCOPY (EGD) (N/A ) Diagnosis:       Melena      (

## 2019-01-21 NOTE — ED NOTES
Orders for admission, patient is aware of plan and ready to go upstairs.  Any questions, please call ED RN Kathe Abdullahi  at [address] [place] [address]

## 2019-01-21 NOTE — PROGRESS NOTES
[facility]    Progress Note    Dg Lezama Patient Status:  Inpatient    1960 MRN [id number]   Location [address] [place] [address] Attending Amiee Rich, [facility] Day # 1 PCP Lexi Campbell MD       Subjective:   Dg Olmedo H&H closely.   -plan u/s of abdomen--> neg  -d/w Dr. Juan Soto, plan colonoscopy tomorrow    Acute blood loss anemia  -hb 17 at baseline, now 12  -repeat in am  -will repeat again if has further melanotic stool  Benign hypertension  Blood pressure well controll

## 2019-01-21 NOTE — PAYOR COMM NOTE
--------------  ADMISSION REVIEW     Payor: Rinku Bravo #:  [id number]  Authorization Number: [id number]    Admit date: 1/20/19  Admit time: 2127       Admitting Physician: Marcial Garcia MD  Attending Physician: Negative. Cardiovascular: Negative. Gastrointestinal: Positive for abdominal pain and blood in stool. Genitourinary: Negative. Musculoskeletal: Negative. Skin: Negative. Neurological: Negative.     All other systems reviewed and are negativ normal limits   CBC WITH DIFFERENTIAL WITH PLATELET    Narrative: The following orders were created for panel order CBC WITH DIFFERENTIAL WITH PLATELET.   Procedure                               Abnormality         Status                     --------- epigastric abdominal pain    Medications Prior to Admission:  Lisinopril-Hydrochlorothiazide 20-12.5 MG Oral Tab Take 1 tablet by mouth daily. Disp: 90 tablet Rfl: 0 1/20/2019 at Unknown time   Omeprazole 20 MG Oral Tab EC Take 1 tablet by mouth daily.  Dis the last 30 days. Any changes noted above. [place]  1/21/2019  10:00 AM      Electronically signed by Marbella Holbrook MD on 1/21/2019 10:01 AM        Ref Range & Units 1/21/19 0533   WBC 4.0 - 11.0 K/UL 7.6    RBC 4.50 - 5.90 M/UL 3.93 Abnor User    1/20/2019 2001 New Bag 1000 mL Intravenous Rich Hanson RN      Zolpidem Tartrate (AMBIEN) tab 5 mg     Date Action Dose Route User    1/20/2019 2336 Given 5 mg Oral Radha Bolton RN        [facility] Endoscopy Report        P

## 2019-01-22 ENCOUNTER — APPOINTMENT (OUTPATIENT)
Dept: ULTRASOUND IMAGING | Facility: HOSPITAL | Age: 59
DRG: 378 | End: 2019-01-22
Attending: HOSPITALIST
Payer: MEDICAID

## 2019-01-22 LAB
ANION GAP SERPL CALC-SCNC: 10 MMOL/L (ref 0–18)
BASOPHILS # BLD: 0 K/UL (ref 0–0.2)
BASOPHILS NFR BLD: 0 %
BUN SERPL-MCNC: 13 MG/DL (ref 8–20)
BUN/CREAT SERPL: 16.5 (ref 10–20)
CALCIUM SERPL-MCNC: 8.5 MG/DL (ref 8.5–10.5)
CHLORIDE SERPL-SCNC: 100 MMOL/L (ref 95–110)
CO2 SERPL-SCNC: 25 MMOL/L (ref 22–32)
CREAT SERPL-MCNC: 0.79 MG/DL (ref 0.5–1.5)
EOSINOPHIL # BLD: 0.1 K/UL (ref 0–0.7)
EOSINOPHIL NFR BLD: 3 %
ERYTHROCYTE [DISTWIDTH] IN BLOOD BY AUTOMATED COUNT: 12.4 % (ref 11–15)
GLUCOSE SERPL-MCNC: 127 MG/DL (ref 70–99)
HCT VFR BLD AUTO: 36.1 % (ref 41–52)
HGB BLD-MCNC: 12.7 G/DL (ref 13.5–17.5)
LYMPHOCYTES # BLD: 1.5 K/UL (ref 1–4)
LYMPHOCYTES NFR BLD: 27 %
MAGNESIUM SERPL-MCNC: 2 MG/DL (ref 1.8–2.5)
MCH RBC QN AUTO: 32.6 PG (ref 27–32)
MCHC RBC AUTO-ENTMCNC: 35 G/DL (ref 32–37)
MCV RBC AUTO: 93.2 FL (ref 80–100)
MONOCYTES # BLD: 0.5 K/UL (ref 0–1)
MONOCYTES NFR BLD: 10 %
NEUTROPHILS # BLD AUTO: 3.3 K/UL (ref 1.8–7.7)
NEUTROPHILS NFR BLD: 61 %
OSMOLALITY UR CALC.SUM OF ELEC: 282 MOSM/KG (ref 275–295)
PLATELET # BLD AUTO: 174 K/UL (ref 140–400)
PMV BLD AUTO: 8.9 FL (ref 7.4–10.3)
POTASSIUM SERPL-SCNC: 3.9 MMOL/L (ref 3.3–5.1)
RBC # BLD AUTO: 3.88 M/UL (ref 4.5–5.9)
SODIUM SERPL-SCNC: 135 MMOL/L (ref 136–144)
WBC # BLD AUTO: 5.5 K/UL (ref 4–11)

## 2019-01-22 PROCEDURE — 99232 SBSQ HOSP IP/OBS MODERATE 35: CPT | Performed by: INTERNAL MEDICINE

## 2019-01-22 PROCEDURE — 76700 US EXAM ABDOM COMPLETE: CPT | Performed by: HOSPITALIST

## 2019-01-22 PROCEDURE — 99233 SBSQ HOSP IP/OBS HIGH 50: CPT | Performed by: HOSPITALIST

## 2019-01-22 NOTE — PROGRESS NOTES
[facility] AND CLINICS  Progress Note    [address] Patient Status:  Inpatient    1960 MRN [id number]   Location [address] [facility] Attending Marie Dickerson, [facility]  Day # 2 PCP Aruna Ramsey MD     Subjective:  [address] resolved and his liver ultrasound was reassuring, he does have some fatty buildup on the liver.       Plan:  - colonoscopy with bowel prep and MAC  - follow blood counts  - consider next steps- capsule if above negative and evidence of ongoing GI blood loss

## 2019-01-22 NOTE — PAYOR COMM NOTE
--------------  CONTINUED STAY REVIEW    Payor: Rinku Bravo #:  [id number]  Authorization Number: [id number]    Admit date: 1/20/19  Admit time: 2127    Admitting Physician: Benito Tomas MD  Attending Physician the procedure were outlined including risk of perforation, bleeding, missed lesion medication reaction.     His abdominal pain has resolved and his liver ultrasound was reassuring, he does have some fatty buildup on the liver.       Plan:  - colonoscopy wi

## 2019-01-22 NOTE — PLAN OF CARE
Problem: Patient Centered Care  Goal: Patient preferences are identified and integrated in the patient's plan of care  Interventions:  - What would you like us to know as we care for you?   - Provide timely, complete, and accurate information to patient/fa physical needs  - Identify cognitive and physical deficits and behaviors that affect risk of falls.   - [place] fall precautions as indicated by assessment.  - Educate pt/family on patient safety including physical limitations  - Instruct pt to call for a

## 2019-01-23 ENCOUNTER — ANESTHESIA (OUTPATIENT)
Dept: ENDOSCOPY | Facility: HOSPITAL | Age: 59
DRG: 378 | End: 2019-01-23
Payer: MEDICAID

## 2019-01-23 ENCOUNTER — ANESTHESIA EVENT (OUTPATIENT)
Dept: ENDOSCOPY | Facility: HOSPITAL | Age: 59
DRG: 378 | End: 2019-01-23
Payer: MEDICAID

## 2019-01-23 LAB
ANION GAP SERPL CALC-SCNC: 10 MMOL/L (ref 0–18)
BASOPHILS # BLD: 0 K/UL (ref 0–0.2)
BASOPHILS NFR BLD: 0 %
BUN SERPL-MCNC: 9 MG/DL (ref 8–20)
BUN/CREAT SERPL: 11.8 (ref 10–20)
CALCIUM SERPL-MCNC: 8.3 MG/DL (ref 8.5–10.5)
CHLORIDE SERPL-SCNC: 103 MMOL/L (ref 95–110)
CO2 SERPL-SCNC: 24 MMOL/L (ref 22–32)
CREAT SERPL-MCNC: 0.76 MG/DL (ref 0.5–1.5)
EOSINOPHIL # BLD: 0.2 K/UL (ref 0–0.7)
EOSINOPHIL NFR BLD: 4 %
ERYTHROCYTE [DISTWIDTH] IN BLOOD BY AUTOMATED COUNT: 12.4 % (ref 11–15)
GLUCOSE SERPL-MCNC: 99 MG/DL (ref 70–99)
HCT VFR BLD AUTO: 31.7 % (ref 41–52)
HGB BLD-MCNC: 11 G/DL (ref 13.5–17.5)
LYMPHOCYTES # BLD: 1.7 K/UL (ref 1–4)
LYMPHOCYTES NFR BLD: 35 %
MAGNESIUM SERPL-MCNC: 1.8 MG/DL (ref 1.8–2.5)
MCH RBC QN AUTO: 32.2 PG (ref 27–32)
MCHC RBC AUTO-ENTMCNC: 34.6 G/DL (ref 32–37)
MCV RBC AUTO: 93 FL (ref 80–100)
MONOCYTES # BLD: 0.5 K/UL (ref 0–1)
MONOCYTES NFR BLD: 11 %
NEUTROPHILS # BLD AUTO: 2.5 K/UL (ref 1.8–7.7)
NEUTROPHILS NFR BLD: 50 %
OSMOLALITY UR CALC.SUM OF ELEC: 283 MOSM/KG (ref 275–295)
PLATELET # BLD AUTO: 154 K/UL (ref 140–400)
PMV BLD AUTO: 8.5 FL (ref 7.4–10.3)
POTASSIUM SERPL-SCNC: 3.6 MMOL/L (ref 3.3–5.1)
RBC # BLD AUTO: 3.41 M/UL (ref 4.5–5.9)
SODIUM SERPL-SCNC: 137 MMOL/L (ref 136–144)
WBC # BLD AUTO: 4.9 K/UL (ref 4–11)

## 2019-01-23 PROCEDURE — 99233 SBSQ HOSP IP/OBS HIGH 50: CPT | Performed by: HOSPITALIST

## 2019-01-23 PROCEDURE — 0DJD8ZZ INSPECTION OF LOWER INTESTINAL TRACT, VIA NATURAL OR ARTIFICIAL OPENING ENDOSCOPIC: ICD-10-PCS | Performed by: INTERNAL MEDICINE

## 2019-01-23 PROCEDURE — 45378 DIAGNOSTIC COLONOSCOPY: CPT | Performed by: INTERNAL MEDICINE

## 2019-01-23 RX ORDER — ONDANSETRON 2 MG/ML
4 INJECTION INTRAMUSCULAR; INTRAVENOUS ONCE AS NEEDED
Status: DISCONTINUED | OUTPATIENT
Start: 2019-01-23 | End: 2019-01-23 | Stop reason: HOSPADM

## 2019-01-23 RX ORDER — SODIUM CHLORIDE, SODIUM LACTATE, POTASSIUM CHLORIDE, CALCIUM CHLORIDE 600; 310; 30; 20 MG/100ML; MG/100ML; MG/100ML; MG/100ML
INJECTION, SOLUTION INTRAVENOUS CONTINUOUS
Status: DISCONTINUED | OUTPATIENT
Start: 2019-01-23 | End: 2019-01-24

## 2019-01-23 RX ORDER — HYDROCODONE BITARTRATE AND ACETAMINOPHEN 5; 325 MG/1; MG/1
1 TABLET ORAL AS NEEDED
Status: DISCONTINUED | OUTPATIENT
Start: 2019-01-23 | End: 2019-01-23 | Stop reason: HOSPADM

## 2019-01-23 RX ORDER — MORPHINE SULFATE 4 MG/ML
4 INJECTION, SOLUTION INTRAMUSCULAR; INTRAVENOUS EVERY 10 MIN PRN
Status: DISCONTINUED | OUTPATIENT
Start: 2019-01-23 | End: 2019-01-23 | Stop reason: HOSPADM

## 2019-01-23 RX ORDER — MORPHINE SULFATE 10 MG/ML
6 INJECTION, SOLUTION INTRAMUSCULAR; INTRAVENOUS EVERY 10 MIN PRN
Status: DISCONTINUED | OUTPATIENT
Start: 2019-01-23 | End: 2019-01-23 | Stop reason: HOSPADM

## 2019-01-23 RX ORDER — LIDOCAINE HYDROCHLORIDE 10 MG/ML
INJECTION, SOLUTION EPIDURAL; INFILTRATION; INTRACAUDAL; PERINEURAL AS NEEDED
Status: DISCONTINUED | OUTPATIENT
Start: 2019-01-23 | End: 2019-01-23 | Stop reason: SURG

## 2019-01-23 RX ORDER — MAGNESIUM OXIDE 400 MG (241.3 MG MAGNESIUM) TABLET
400 TABLET ONCE
Status: COMPLETED | OUTPATIENT
Start: 2019-01-23 | End: 2019-01-23

## 2019-01-23 RX ORDER — HYDROCODONE BITARTRATE AND ACETAMINOPHEN 5; 325 MG/1; MG/1
2 TABLET ORAL AS NEEDED
Status: DISCONTINUED | OUTPATIENT
Start: 2019-01-23 | End: 2019-01-23 | Stop reason: HOSPADM

## 2019-01-23 RX ORDER — MORPHINE SULFATE 4 MG/ML
2 INJECTION, SOLUTION INTRAMUSCULAR; INTRAVENOUS EVERY 10 MIN PRN
Status: DISCONTINUED | OUTPATIENT
Start: 2019-01-23 | End: 2019-01-23 | Stop reason: HOSPADM

## 2019-01-23 RX ORDER — MIDAZOLAM HYDROCHLORIDE 1 MG/ML
INJECTION INTRAMUSCULAR; INTRAVENOUS AS NEEDED
Status: DISCONTINUED | OUTPATIENT
Start: 2019-01-23 | End: 2019-01-23 | Stop reason: SURG

## 2019-01-23 RX ORDER — POTASSIUM CHLORIDE 20 MEQ/1
40 TABLET, EXTENDED RELEASE ORAL EVERY 4 HOURS
Status: COMPLETED | OUTPATIENT
Start: 2019-01-23 | End: 2019-01-23

## 2019-01-23 RX ORDER — NALOXONE HYDROCHLORIDE 0.4 MG/ML
80 INJECTION, SOLUTION INTRAMUSCULAR; INTRAVENOUS; SUBCUTANEOUS AS NEEDED
Status: DISCONTINUED | OUTPATIENT
Start: 2019-01-23 | End: 2019-01-23 | Stop reason: HOSPADM

## 2019-01-23 RX ORDER — HALOPERIDOL 5 MG/ML
0.25 INJECTION INTRAMUSCULAR ONCE AS NEEDED
Status: DISCONTINUED | OUTPATIENT
Start: 2019-01-23 | End: 2019-01-23 | Stop reason: HOSPADM

## 2019-01-23 RX ADMIN — SODIUM CHLORIDE: 9 INJECTION, SOLUTION INTRAVENOUS at 13:11:00

## 2019-01-23 RX ADMIN — LIDOCAINE HYDROCHLORIDE 50 MG: 10 INJECTION, SOLUTION EPIDURAL; INFILTRATION; INTRACAUDAL; PERINEURAL at 12:47:00

## 2019-01-23 RX ADMIN — SODIUM CHLORIDE: 9 INJECTION, SOLUTION INTRAVENOUS at 12:46:00

## 2019-01-23 RX ADMIN — MIDAZOLAM HYDROCHLORIDE 2 MG: 1 INJECTION INTRAMUSCULAR; INTRAVENOUS at 12:50:00

## 2019-01-23 NOTE — ANESTHESIA POSTPROCEDURE EVALUATION
Patient: Tung Gandara    Procedure Summary     Date:  01/23/19 Room / Location:  [address] [place] [address] ENDOSCOPY 05 / [address] [place] [address] ENDOSCOPY    Anesthesia Start:  7411 Anesthesia Stop:      Procedure:  COLONOSCOPY (N/A ) Diagnosis:  (diverticulosis; hemorrhoids )    Surge

## 2019-01-23 NOTE — ANESTHESIA PREPROCEDURE EVALUATION
Anesthesia PreOp Note    HPI:     Luis E Dong is a 60year old male who presents for preoperative consultation requested by: Jackalyn Duane, MD    Date of Surgery: 1/20/2019 - 1/23/2019    Procedure(s):  COLONOSCOPY  Indication: gi bleeding Facility-Administered Medications Ordered in Epic:  lactated ringers infusion  Intravenous Continuous Sophia Fam CRNA    Pantoprazole Sodium (PROTONIX) EC tab 40 mg 40 mg Oral QA AC Linh Angeles MD 40 mg at 01/23/19 0628   ondansetron HCl (ZOFR Comment: 6+ beers 3 days a week      Drug use: No      Sexual activity: Not on file    Other Topics      Concerns:         Service: Not Asked        Blood Transfusions: Not Asked        Caffeine Concern: Yes          coffee, 2cups/day        Carlos Enrique normal exam   Cardiovascular - negative ROS and normal exam  Exercise tolerance: good    Neuro/Psych - negative ROS     GI/Hepatic/Renal - negative ROS     Endo/Other - negative ROS   Abdominal              Anesthesia Plan:   ASA:  2  Plan:   MAC  Post-op

## 2019-01-23 NOTE — PROGRESS NOTES
[facility] HOSP - [facility]    Progress Note    Rere Jeanette Lezama Patient Status:  Inpatient    1960 MRN [id number]   Location [facility] Attending Kang Devi MD   Hosp Day # 3 PCP Jeronimo Galvez MD       Subjective:   Nini Florez etiology of the patient's symptoms is unclear from this study. 2. Hepatomegaly with underlying hepatic steatosis. 3. Simple-appearing renal cysts. 4. Lesser incidental findings as above.     Dictated by (CST): Marianna Caro MD on 1/22/2019 at 6:47

## 2019-01-23 NOTE — H&P
History & Physical Examination    Patient Name: Arin Schwarz  MRN: [id number]  CSN: [id number]  YOB: 1960    Diagnosis: GI bleed, negative EGD      Medications Prior to Admission:  Lisinopril-Hydrochlorothiazide 20-12.5 MG Oral Tab tablet Oral PRN   fentaNYL citrate (SUBLIMAZE) 0.05 MG/ML injection 25 mcg 25 mcg Intravenous Q5 Min PRN   fentaNYL citrate (SUBLIMAZE) 0.05 MG/ML injection 50 mcg 50 mcg Intravenous Q5 Min PRN   morphINE sulfate (PF) 4 MG/ML injection 2 mg 2 mg Intravenou Surgical History:   Procedure Laterality Date   • COLONOSCOPY  2010 4th colon; Dr. Meredith Sorensen   • COLONOSCOPY     • COLONOSCOPY N/A 3/5/2018    Performed by Burak Hoffmann MD at [facility] ENDOSCOPY     Family History   Problem Relation Age of Onset   • Diabetes

## 2019-01-23 NOTE — PLAN OF CARE
Problem: Patient Centered Care  Goal: Patient preferences are identified and integrated in the patient's plan of care  Interventions:  - What would you like us to know as we care for you?   - Provide timely, complete, and accurate information to patient/fa physical needs  - Identify cognitive and physical deficits and behaviors that affect risk of falls.   - [place] fall precautions as indicated by assessment.  - Educate pt/family on patient safety including physical limitations  - Instruct pt to call for a

## 2019-01-23 NOTE — OPERATIVE REPORT
[facility] - [facility] Endoscopy Report      Preoperative Diagnosis:  - GI bleed      Postoperative Diagnosis:  - flecks of old blood in distal colon  - diverticular disease in the sigmoid and descending colon  - small internal hemorrhoids      Procedu

## 2019-01-23 NOTE — PLAN OF CARE
Problem: Patient/Family Goals  Goal: Patient/Family Long Term Goal  Patient's Long Term Goal: STABLE HGB    Interventions:  - MONITOR LABS AND VITAL SIGNS  - See additional Care Plan goals for specific interventions   Outcome: Progressing    Goal: Patient/

## 2019-01-24 ENCOUNTER — MED REC SCAN ONLY (OUTPATIENT)
Dept: GASTROENTEROLOGY | Facility: CLINIC | Age: 59
End: 2019-01-24

## 2019-01-24 VITALS
BODY MASS INDEX: 32.09 KG/M2 | SYSTOLIC BLOOD PRESSURE: 141 MMHG | WEIGHT: 236.88 LBS | TEMPERATURE: 98 F | OXYGEN SATURATION: 98 % | DIASTOLIC BLOOD PRESSURE: 74 MMHG | RESPIRATION RATE: 18 BRPM | HEART RATE: 60 BPM | HEIGHT: 72 IN

## 2019-01-24 LAB
ANION GAP SERPL CALC-SCNC: 8 MMOL/L (ref 0–18)
BASOPHILS # BLD: 0 K/UL (ref 0–0.2)
BASOPHILS NFR BLD: 0 %
BUN SERPL-MCNC: 7 MG/DL (ref 8–20)
BUN/CREAT SERPL: 9.3 (ref 10–20)
CALCIUM SERPL-MCNC: 8.4 MG/DL (ref 8.5–10.5)
CHLORIDE SERPL-SCNC: 106 MMOL/L (ref 95–110)
CO2 SERPL-SCNC: 22 MMOL/L (ref 22–32)
CREAT SERPL-MCNC: 0.75 MG/DL (ref 0.5–1.5)
EOSINOPHIL # BLD: 0.2 K/UL (ref 0–0.7)
EOSINOPHIL NFR BLD: 4 %
ERYTHROCYTE [DISTWIDTH] IN BLOOD BY AUTOMATED COUNT: 12.8 % (ref 11–15)
GLUCOSE SERPL-MCNC: 114 MG/DL (ref 70–99)
HCT VFR BLD AUTO: 33 % (ref 41–52)
HGB BLD-MCNC: 11.5 G/DL (ref 13.5–17.5)
LYMPHOCYTES # BLD: 1.4 K/UL (ref 1–4)
LYMPHOCYTES NFR BLD: 30 %
MAGNESIUM SERPL-MCNC: 2 MG/DL (ref 1.8–2.5)
MCH RBC QN AUTO: 32.7 PG (ref 27–32)
MCHC RBC AUTO-ENTMCNC: 34.9 G/DL (ref 32–37)
MCV RBC AUTO: 93.9 FL (ref 80–100)
MONOCYTES # BLD: 0.5 K/UL (ref 0–1)
MONOCYTES NFR BLD: 10 %
NEUTROPHILS # BLD AUTO: 2.7 K/UL (ref 1.8–7.7)
NEUTROPHILS NFR BLD: 56 %
OSMOLALITY UR CALC.SUM OF ELEC: 281 MOSM/KG (ref 275–295)
PLATELET # BLD AUTO: 171 K/UL (ref 140–400)
PMV BLD AUTO: 8.6 FL (ref 7.4–10.3)
POTASSIUM SERPL-SCNC: 4 MMOL/L (ref 3.3–5.1)
RBC # BLD AUTO: 3.52 M/UL (ref 4.5–5.9)
SODIUM SERPL-SCNC: 136 MMOL/L (ref 136–144)
WBC # BLD AUTO: 4.9 K/UL (ref 4–11)

## 2019-01-24 PROCEDURE — 99239 HOSP IP/OBS DSCHRG MGMT >30: CPT | Performed by: HOSPITALIST

## 2019-01-24 NOTE — PLAN OF CARE
Problem: Patient Centered Care  Goal: Patient preferences are identified and integrated in the patient's plan of care  Interventions:  - What would you like us to know as we care for you?   - Provide timely, complete, and accurate information to patient/fa rectal medication administration  - Ensure safe mobilization of patient  - Hold pressure on venipuncture sites to achieve adequate hemostasis  - Assess for signs and symptoms of internal bleeding  - Monitor lab trends  Outcome: Progressing  Patient monitor

## 2019-01-24 NOTE — PROGRESS NOTES
[facility] CLIN[facility]  Progress Note    [address] Patient Status:  Inpatient    1960 MRN [id number]   Location [facility] Attending Colin Garza, [facility] Se Day # 4 PCP Valerie Betancourt MD     Subjective:  [address]

## 2019-01-24 NOTE — PLAN OF CARE
Problem: Patient Centered Care  Goal: Patient preferences are identified and integrated in the patient's plan of care  Interventions:  - What would you like us to know as we care for you?   - Provide timely, complete, and accurate information to patient/fa physical needs  - Identify cognitive and physical deficits and behaviors that affect risk of falls.   - [place] fall precautions as indicated by assessment.  - Educate pt/family on patient safety including physical limitations  - Instruct pt to call for a

## 2019-01-24 NOTE — DISCHARGE SUMMARY
[facility]    Discharge Summary    [address] Patient Status:  Inpatient    1960 MRN [id number]   Location [facility] Attending Addie Guy., MD   Hosp Day # 4 PCP Keiko Cade MD     Date of Admission aggravating or relieving factors. He rates his pain as a 5 out of 10 at its worst nonradiating in nature. He denies any associated nausea vomiting diarrhea or constipation however does have 2-3 bowel movements a day which tend to be black and tarry.   He discussion of further evaluation and therapy. Rob Rob.  Jodee Mcdermott  1/24/2019

## 2019-01-25 ENCOUNTER — PATIENT OUTREACH (OUTPATIENT)
Dept: CASE MANAGEMENT | Age: 59
End: 2019-01-25

## 2019-01-25 NOTE — PROGRESS NOTES
Alaina Marti [phone] for post hospital follow up, NCM contact information provided.  Not a TCM

## 2019-02-04 ENCOUNTER — APPOINTMENT (OUTPATIENT)
Dept: LAB | Facility: HOSPITAL | Age: 59
End: 2019-02-04
Attending: HOSPITALIST
Payer: MEDICAID

## 2019-02-04 DIAGNOSIS — K92.2 ACUTE UPPER GASTROINTESTINAL BLEEDING: ICD-10-CM

## 2019-02-04 LAB
DEPRECATED RDW RBC AUTO: 43.6 FL (ref 35.1–46.3)
ERYTHROCYTE [DISTWIDTH] IN BLOOD BY AUTOMATED COUNT: 12.8 % (ref 11–15)
HCT VFR BLD AUTO: 39.9 % (ref 39–53)
HGB BLD-MCNC: 14 G/DL (ref 13–17.5)
MCH RBC QN AUTO: 32.7 PG (ref 26–34)
MCHC RBC AUTO-ENTMCNC: 35.1 G/DL (ref 31–37)
MCV RBC AUTO: 93.2 FL (ref 80–100)
PLATELET # BLD AUTO: 232 10(3)UL (ref 150–450)
RBC # BLD AUTO: 4.28 X10(6)UL (ref 4.3–5.7)
WBC # BLD AUTO: 4.8 X10(3) UL (ref 4–11)

## 2019-02-04 PROCEDURE — 36415 COLL VENOUS BLD VENIPUNCTURE: CPT

## 2019-02-04 PROCEDURE — 85027 COMPLETE CBC AUTOMATED: CPT

## 2019-02-05 ENCOUNTER — TELEPHONE (OUTPATIENT)
Dept: GASTROENTEROLOGY | Facility: CLINIC | Age: 59
End: 2019-02-05

## 2019-02-05 ENCOUNTER — OFFICE VISIT (OUTPATIENT)
Dept: INTERNAL MEDICINE CLINIC | Facility: CLINIC | Age: 59
End: 2019-02-05
Payer: MEDICAID

## 2019-02-05 VITALS
SYSTOLIC BLOOD PRESSURE: 126 MMHG | BODY MASS INDEX: 31.62 KG/M2 | WEIGHT: 236 LBS | HEART RATE: 73 BPM | DIASTOLIC BLOOD PRESSURE: 68 MMHG | HEIGHT: 72.5 IN

## 2019-02-05 DIAGNOSIS — K92.1 GASTROINTESTINAL HEMORRHAGE WITH MELENA: Primary | ICD-10-CM

## 2019-02-05 DIAGNOSIS — I10 ESSENTIAL HYPERTENSION: ICD-10-CM

## 2019-02-05 PROBLEM — K92.2 GASTROINTESTINAL HEMORRHAGE: Status: ACTIVE | Noted: 2019-01-01

## 2019-02-05 PROCEDURE — 99212 OFFICE O/P EST SF 10 MIN: CPT | Performed by: INTERNAL MEDICINE

## 2019-02-05 PROCEDURE — 99214 OFFICE O/P EST MOD 30 MIN: CPT | Performed by: INTERNAL MEDICINE

## 2019-02-05 PROCEDURE — 1111F DSCHRG MED/CURRENT MED MERGE: CPT | Performed by: INTERNAL MEDICINE

## 2019-02-05 RX ORDER — LISINOPRIL AND HYDROCHLOROTHIAZIDE 20; 12.5 MG/1; MG/1
1 TABLET ORAL DAILY
Qty: 90 TABLET | Refills: 1 | Status: SHIPPED | OUTPATIENT
Start: 2019-02-05 | End: 2019-06-11

## 2019-02-05 NOTE — PROGRESS NOTES
Several attempts made to reach the patient with no return call. Patient completed HFU on 2/5/2019. Closing encounter.

## 2019-02-05 NOTE — PATIENT INSTRUCTIONS
Remain off aspirin and alcohol. Continue current medication. Return visit in 3-4 months for physical and labs.

## 2019-02-05 NOTE — PROGRESS NOTES
Victor M Carnes is a 60year old male. Patient presents with:  Hospital F/U: Stts he was admitted to [address] [place] [address] on 1/20/19 and dx with upper gastrointestinal bleeding    HPI:   Mr. Garrett Blocker presents this afternoon for hospital follow-up.     He was ho tablet Rfl: 5   Multiple Vitamin (MULTI VITAMIN MENS) Oral Tab Take 1 tablet by mouth daily. Disp:  Rfl:    omega-3 fatty acids 1000 MG Oral Cap Take 1,000 mg by mouth daily.  Take 3 pills daily Disp:  Rfl:        Amoxicillin             HIVES  [facility]. Gastrointestinal hemorrhage with melena  Clinically resolved. EGD and colonoscopy without obvious source of bleeding. Most recent CBC improved. Remain off aspirin. Continue current medications.   Prescription refill for lisinopril/HCTZ for 6 months

## 2019-02-05 NOTE — TELEPHONE ENCOUNTER
Phone room, left message on both voicemails to call back and make f/u appt with Chris JAIN for recent hospitalization GI bleed. Thanks.

## 2019-02-06 ENCOUNTER — TELEPHONE (OUTPATIENT)
Dept: GASTROENTEROLOGY | Facility: CLINIC | Age: 59
End: 2019-02-06

## 2019-02-06 NOTE — TELEPHONE ENCOUNTER
Pt contacted, accepted appt with Dr Nu Miller on Wed April 3, arrival 11:30am, given directions to [facility].

## 2019-02-06 NOTE — TELEPHONE ENCOUNTER
Dr Whitley--pt contacted, given results below. I offered appt with Erich JAIN--states he just saw Dr Jacinto Vegas in office yesterday and is feeling fine. Does he still need another appt with our office? If so, how far out can we make it? Thanks.

## 2019-02-06 NOTE — TELEPHONE ENCOUNTER
I would have him follow up in 8 weeks - just to make sure the bleeding has stopped, if he notices blood in the stool to contact immediately.

## 2019-02-06 NOTE — TELEPHONE ENCOUNTER
----- Message from Mirela Burciaga MD sent at 2/5/2019  2:48 PM CST -----  Blood count back up to 14 - bleeding has stopped     RN to have pt follow up in the office.

## 2019-02-11 NOTE — PAYOR COMM NOTE
--------------  DISCHARGE REVIEW    Payor: Rinku Bravo #:  [id number]  Authorization Number: [id number]    Admit date: 1/20/19  Admit time:  2127  Discharge Date: 1/24/2019  2:08 PM     Admitting Physician: Hector Garcia murmurs  LUNGS:  No audible wheezing  ABDOMEN: Non-distended  MUSCULOSKELETAL:  There was no deformity. There was full range of motion in all the extremities.    EXTREMITIES: There was no edema, clubbing or cyanosis  NEUROLOGICAL:  There was no focal defic 1 tablet by mouth daily. Quantity:  90 tablet  Refills:  0     MULTI VITAMIN MENS Tabs      Take 1 tablet by mouth daily. Refills:  0     omega-3 fatty acids 1000 MG Caps  Commonly known as:  FISH OIL      Take 1,000 mg by mouth daily.  Take 3 pills harjeet pattern.     Diverticular disease noted in the sigmoid and descending colon.    Flecks of old blood noted in the sigmoid colon, no active bleeding site noted.      Internal hemorrhoids, small.      Impression:  -Possible quiescent diverticular bleed versus am  -will repeat again if has further melanotic stool  Benign hypertension  Blood pressure well controlled resume home medication.     Prophylaxis  SCDs, heparin on hold till patient evaluated by GI.              Greater than 35 minutes spent, >50% spent c

## 2019-03-27 ENCOUNTER — OFFICE VISIT (OUTPATIENT)
Dept: INTERNAL MEDICINE CLINIC | Facility: CLINIC | Age: 59
End: 2019-03-27
Payer: MEDICAID

## 2019-03-27 VITALS
WEIGHT: 242 LBS | HEART RATE: 73 BPM | SYSTOLIC BLOOD PRESSURE: 134 MMHG | DIASTOLIC BLOOD PRESSURE: 82 MMHG | BODY MASS INDEX: 32 KG/M2

## 2019-03-27 DIAGNOSIS — Z00.00 ANNUAL PHYSICAL EXAM: Primary | ICD-10-CM

## 2019-03-27 DIAGNOSIS — E78.5 DYSLIPIDEMIA: ICD-10-CM

## 2019-03-27 DIAGNOSIS — I10 ESSENTIAL HYPERTENSION: ICD-10-CM

## 2019-03-27 DIAGNOSIS — M76.62 ACHILLES TENDINITIS OF LEFT LOWER EXTREMITY: ICD-10-CM

## 2019-03-27 DIAGNOSIS — K21.9 GASTROESOPHAGEAL REFLUX DISEASE, ESOPHAGITIS PRESENCE NOT SPECIFIED: ICD-10-CM

## 2019-03-27 DIAGNOSIS — D12.6 ADENOMATOUS POLYP OF COLON, UNSPECIFIED PART OF COLON: ICD-10-CM

## 2019-03-27 DIAGNOSIS — K76.0 HEPATIC STEATOSIS: ICD-10-CM

## 2019-03-27 PROBLEM — K92.2 GASTROINTESTINAL HEMORRHAGE: Status: RESOLVED | Noted: 2019-01-01 | Resolved: 2019-03-27

## 2019-03-27 PROBLEM — K92.2 ACUTE UPPER GASTROINTESTINAL BLEEDING: Status: RESOLVED | Noted: 2019-01-20 | Resolved: 2019-03-27

## 2019-03-27 PROCEDURE — 99396 PREV VISIT EST AGE 40-64: CPT | Performed by: INTERNAL MEDICINE

## 2019-03-27 NOTE — H&P
Lowell Greene is a 60year old male who presents for a complete physical exam.   HPI:   Lately he has been feeling well. No specific issues for discussion. 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 Occasional BP checks 130s/80s.   He deci Medical History:   Diagnosis Date   • Acute diverticulitis January 2014   • Colon polyps    • Duodenal ulcer with hemorrhage May 2010    EGD with H. pylori negative   • Dyslipidemia    • Essential hypertension    • Gastrointestinal hemorrhage 01/2019    Me swelling  NEURO: No headaches    EXAM:   GENERAL: Pleasant male appearing well in no distress  /82   Pulse 73   Wt 242 lb (109.8 kg)   BMI 32.37 kg/m²   HEENT: Anicteric, conjunctiva pink, oropharynx normal  NECK: Supple without mass or thyromegaly NSAIDs given recent GI bleed      Blanca Shafer MD  3/27/2019  4:04 PM

## 2019-03-27 NOTE — PATIENT INSTRUCTIONS
Please obtain blood tests soon when you can. Continue current medications. Please try to eat healthy, exercise regularly and lose some weight. Return visit in 6 months.

## 2019-04-03 ENCOUNTER — OFFICE VISIT (OUTPATIENT)
Dept: GASTROENTEROLOGY | Facility: CLINIC | Age: 59
End: 2019-04-03
Payer: MEDICAID

## 2019-04-03 ENCOUNTER — TELEPHONE (OUTPATIENT)
Dept: GASTROENTEROLOGY | Facility: CLINIC | Age: 59
End: 2019-04-03

## 2019-04-03 VITALS
BODY MASS INDEX: 32.82 KG/M2 | HEART RATE: 63 BPM | WEIGHT: 245 LBS | SYSTOLIC BLOOD PRESSURE: 134 MMHG | HEIGHT: 72.5 IN | DIASTOLIC BLOOD PRESSURE: 82 MMHG

## 2019-04-03 DIAGNOSIS — Z87.19 HISTORY OF GI BLEED: Primary | ICD-10-CM

## 2019-04-03 PROCEDURE — 99212 OFFICE O/P EST SF 10 MIN: CPT | Performed by: INTERNAL MEDICINE

## 2019-04-03 PROCEDURE — 99213 OFFICE O/P EST LOW 20 MIN: CPT | Performed by: INTERNAL MEDICINE

## 2019-04-03 NOTE — PROGRESS NOTES
Luis E Dong is a 60year old male. HPI:   Patient presents with:   Follow - Up: pt in for f/u blood in stools, pt doing well    The patient is a 53-year-old male who was admitted with melena and GI bleeding, upper endoscopy did not show any, oz      Frequency: Never      Comment: Former    Drug use: No       Medications (Active prior to today's visit):    Current Outpatient Medications:  Lisinopril-Hydrochlorothiazide 20-12.5 MG Oral Tab Take 1 tablet by mouth daily.  Disp: 90 tablet Rfl: 1   O

## 2019-04-03 NOTE — PATIENT INSTRUCTIONS
GI bleeding history - resolved  - call if symptoms return  - work on fiber and eat healthy  - call or follow up with any new issues

## 2019-05-01 ENCOUNTER — APPOINTMENT (OUTPATIENT)
Dept: LAB | Facility: HOSPITAL | Age: 59
End: 2019-05-01
Attending: INTERNAL MEDICINE
Payer: MEDICAID

## 2019-05-01 DIAGNOSIS — Z00.00 ANNUAL PHYSICAL EXAM: ICD-10-CM

## 2019-05-01 PROCEDURE — 36415 COLL VENOUS BLD VENIPUNCTURE: CPT

## 2019-05-01 PROCEDURE — 80053 COMPREHEN METABOLIC PANEL: CPT

## 2019-05-01 PROCEDURE — 80061 LIPID PANEL: CPT

## 2019-05-01 PROCEDURE — 85027 COMPLETE CBC AUTOMATED: CPT

## 2019-05-01 PROCEDURE — 83036 HEMOGLOBIN GLYCOSYLATED A1C: CPT

## 2019-06-11 RX ORDER — LISINOPRIL AND HYDROCHLOROTHIAZIDE 20; 12.5 MG/1; MG/1
1 TABLET ORAL DAILY
Qty: 90 TABLET | Refills: 1 | Status: SHIPPED | OUTPATIENT
Start: 2019-06-11 | End: 2020-03-11

## 2019-08-26 RX ORDER — LISINOPRIL AND HYDROCHLOROTHIAZIDE 20; 12.5 MG/1; MG/1
1 TABLET ORAL DAILY
Qty: 90 TABLET | Refills: 1 | Status: CANCELLED | OUTPATIENT
Start: 2019-08-26

## 2019-08-28 NOTE — TELEPHONE ENCOUNTER
Miriamt refill request.  Message sent to patient to contact pharmacy. Outpatient Medication Detail      Disp Refills Start End    Lisinopril-hydroCHLOROthiazide 20-12.5 MG Oral Tab 90 tablet 1 6/11/2019     Sig - Route:  Take 1 tablet by mouth daily. - O

## 2020-03-12 RX ORDER — LISINOPRIL AND HYDROCHLOROTHIAZIDE 20; 12.5 MG/1; MG/1
1 TABLET ORAL DAILY
Qty: 90 TABLET | Refills: 0 | Status: SHIPPED | OUTPATIENT
Start: 2020-03-12 | End: 2020-06-18

## 2020-06-18 RX ORDER — LISINOPRIL AND HYDROCHLOROTHIAZIDE 20; 12.5 MG/1; MG/1
1 TABLET ORAL DAILY
Qty: 30 TABLET | Refills: 0 | Status: SHIPPED | OUTPATIENT
Start: 2020-06-18 | End: 2020-08-04

## 2020-08-04 ENCOUNTER — OFFICE VISIT (OUTPATIENT)
Dept: INTERNAL MEDICINE CLINIC | Facility: CLINIC | Age: 60
End: 2020-08-04
Payer: MEDICAID

## 2020-08-04 VITALS
BODY MASS INDEX: 33.36 KG/M2 | SYSTOLIC BLOOD PRESSURE: 136 MMHG | RESPIRATION RATE: 20 BRPM | DIASTOLIC BLOOD PRESSURE: 76 MMHG | HEIGHT: 72.5 IN | WEIGHT: 249 LBS | HEART RATE: 68 BPM

## 2020-08-04 DIAGNOSIS — K21.9 GASTROESOPHAGEAL REFLUX DISEASE, ESOPHAGITIS PRESENCE NOT SPECIFIED: ICD-10-CM

## 2020-08-04 DIAGNOSIS — D12.6 ADENOMATOUS POLYP OF COLON, UNSPECIFIED PART OF COLON: ICD-10-CM

## 2020-08-04 DIAGNOSIS — E78.5 DYSLIPIDEMIA: ICD-10-CM

## 2020-08-04 DIAGNOSIS — I10 ESSENTIAL HYPERTENSION: Primary | ICD-10-CM

## 2020-08-04 PROCEDURE — 3008F BODY MASS INDEX DOCD: CPT | Performed by: INTERNAL MEDICINE

## 2020-08-04 PROCEDURE — 99214 OFFICE O/P EST MOD 30 MIN: CPT | Performed by: INTERNAL MEDICINE

## 2020-08-04 PROCEDURE — 3078F DIAST BP <80 MM HG: CPT | Performed by: INTERNAL MEDICINE

## 2020-08-04 PROCEDURE — 3075F SYST BP GE 130 - 139MM HG: CPT | Performed by: INTERNAL MEDICINE

## 2020-08-04 RX ORDER — LISINOPRIL AND HYDROCHLOROTHIAZIDE 20; 12.5 MG/1; MG/1
1 TABLET ORAL DAILY
Qty: 90 TABLET | Refills: 1 | Status: ON HOLD | OUTPATIENT
Start: 2020-08-04 | End: 2020-11-11

## 2020-08-04 RX ORDER — NICOTINE POLACRILEX 4 MG/1
1 GUM, CHEWING ORAL DAILY
Qty: 30 TABLET | Refills: 5 | Status: CANCELLED | OUTPATIENT
Start: 2020-08-04

## 2020-08-04 NOTE — PATIENT INSTRUCTIONS
Please continue current medication. Obtain blood tests soon when you can. Remember to get a flu shot as soon as possible this fall. Please try to follow a healthy diet and exercise regularly. Return visit in 6 months.

## 2020-08-04 NOTE — H&P
Pamela Jose is a 63year old male who presents this afternoon for follow-up of hypertension. He is also due for his annual exam and labs. HPI:   His last office visit here was for a physical March 2019.   He has felt well, and has had no inter Dyslipidemia    • Essential hypertension    • Gastrointestinal hemorrhage 01/2019    Melena; EGD with small hiatal hernia, gastric polyps, mild gastritis, biopsies benign, H. pylori negative; colonoscopy with diverticulosis and internal hemorrhoids   • VALERIA mass or thyromegaly  NODES: No peripheral adenopathy  LUNGS: Resonant to percussion and clear to auscultation  CARDIAC: Rhythm regular S1 S2 normal without murmur or edema  ABDOMEN: Obese.   Bowel sounds normal soft nontender without mass or hepatosplenomeg

## 2020-11-05 ENCOUNTER — APPOINTMENT (OUTPATIENT)
Dept: ULTRASOUND IMAGING | Facility: HOSPITAL | Age: 60
DRG: 440 | End: 2020-11-05
Attending: EMERGENCY MEDICINE
Payer: MEDICAID

## 2020-11-05 ENCOUNTER — APPOINTMENT (OUTPATIENT)
Dept: CT IMAGING | Facility: HOSPITAL | Age: 60
DRG: 440 | End: 2020-11-05
Attending: HOSPITALIST
Payer: MEDICAID

## 2020-11-05 PROBLEM — K85.20 ALCOHOL-INDUCED ACUTE PANCREATITIS: Status: ACTIVE | Noted: 2020-11-05

## 2020-11-05 PROBLEM — K85.20 ALCOHOL-INDUCED ACUTE PANCREATITIS, UNSPECIFIED COMPLICATION STATUS (HCC): Status: ACTIVE | Noted: 2020-11-05

## 2020-11-05 PROBLEM — K85.20 ALCOHOL-INDUCED ACUTE PANCREATITIS, UNSPECIFIED COMPLICATION STATUS: Status: ACTIVE | Noted: 2020-11-05

## 2020-11-05 PROBLEM — K85.20 ALCOHOL-INDUCED ACUTE PANCREATITIS (HCC): Status: ACTIVE | Noted: 2020-11-05

## 2020-11-05 PROCEDURE — 76705 ECHO EXAM OF ABDOMEN: CPT | Performed by: EMERGENCY MEDICINE

## 2020-11-05 PROCEDURE — 74176 CT ABD & PELVIS W/O CONTRAST: CPT | Performed by: HOSPITALIST

## 2020-11-05 NOTE — ED INITIAL ASSESSMENT (HPI)
Patient aox3 to ed via private vehicle patient co of ruq pain x 0200 today radiating to right flank pain 10/10.  +n/v

## 2020-11-05 NOTE — ED NOTES
Orders for admission, patient is aware of plan and ready to go upstairs. Any questions, please call ED PAMELLA Patel  at extension [phone].    Type of COVID test sent:rapid - negative  COVID Suspicion level: Low  Titratable drug(s) infusing:  Rate:n/a    LOC at

## 2020-11-05 NOTE — H&P
[facility]    PATIENT'S NAME: PERLA WHITE   ATTENDING PHYSICIAN: Melquiades Rondon MD   PATIENT ACCOUNT#:   [id number]    LOCATION:  [address] [place] [address]  MEDICAL RECORD #:   [id number]       YOB: 1960  ADMISSION DATE:       1 back.  The patient is having difficulty pinpointing where the pain is exactly. Some nausea but no vomiting. Other 12-point review of systems is negative. PHYSICAL EXAMINATION:    GENERAL:  Alert and oriented to time, place, and person.   Moderate di

## 2020-11-05 NOTE — PROGRESS NOTES
Recv'd patient from ED, patient was able to stand from ED cart and transfer himself onto inpatient bed. Patient had normal steady gait. VSS, febrile, peripheral IV access, SL. Patient was seen by Dr. Howard Bauer at bedside, patient is currently NPO.  States

## 2020-11-05 NOTE — ED PROVIDER NOTES
Patient Seen in: [facility] AND [facility] Emergency Department      History   Patient presents with:  Abdomen/Flank Pain    Stated Complaint: worsening abd pain since 2am    HPI    69-year-old male with past medical history significant for high blood pressure, worsening abd pain since 2am  Other systems are as noted in HPI. Constitutional and vital signs reviewed. All other systems reviewed and negative except as noted above.     Physical Exam     ED Triage Vitals [11/05/20 0750]   BP (!) 180/97   Pulse 71 CBC W/ DIFFERENTIAL - Abnormal; Notable for the following components:    Neutrophil Absolute Prelim 8.45 (*)     Neutrophil Absolute 8.45 (*)     All other components within normal limits   CBC WITH DIFFERENTIAL WITH PLATELET    Narrative:      The follow encounter diagnosis)    Disposition:  Admit  11/5/2020 10:51 am    Follow-up:  No follow-up provider specified.         Medications Prescribed:  Current Discharge Medication List                          Present on Admission  Date Reviewed: 8/4/2020

## 2020-11-06 ENCOUNTER — APPOINTMENT (OUTPATIENT)
Dept: GENERAL RADIOLOGY | Facility: HOSPITAL | Age: 60
DRG: 440 | End: 2020-11-06
Attending: HOSPITALIST
Payer: MEDICAID

## 2020-11-06 PROCEDURE — 71045 X-RAY EXAM CHEST 1 VIEW: CPT | Performed by: HOSPITALIST

## 2020-11-06 NOTE — PAYOR COMM NOTE
--------------  ADMISSION REVIEW     Payor: Rinku Bravo #:  [id number]  Authorization Number: [id number]    Admit date: 11/5/20  Admit time: 1219       Admitting Physician: Dipti Garcia MD  Attending Physician: appearing  Head: Normocephalic and atraumatic. Ears: TM's clear b/l  Nose: Nose normal.   Mouth/Throat: MMM, post OP clear with no exudates  Eyes: Conjunctivae and EOM are normal. PERRLA  Neck: Normal range of motion. Neck supple.  No tracheal deviation p Final result                 Please view results for these tests on the individual orders.    RAINBOW DRAW BLUE   RAINBOW DRAW LAVENDER   RAINBOW DRAW LIGHT GREEN   RAINBOW DRAW GOLD          Us Gallbladder (cpt=76705)    Result Date: 11/5/2020  CONCLUSION: YOB: 1960  ADMISSION DATE:       11/05/2020    HISTORY AND PHYSICAL EXAMINATION    CHIEF COMPLAINT:  Acute pancreatitis, abdominal pain.     HISTORY OF PRESENT ILLNESS:  The patient is a 58-year-old  male with a history of heav through XII are intact. ASSESSMENT AND PLAN:    1. Abdominal pain, could be related to pancreatitis. Rule out diverticulitis. 2.   Fever, could be supportive of an infectious etiology. 3.   Obesity.   4.   Hyperglycemia and diabetes mellitus type [place], PAMELLA        11/05/20 1943 — 128Abnormal  20 140/89 96 % — — — LA   11/05/20 1931 101.1 °F (38.4 °C)Abnormal  — — — — — — — LA   11/05/20 1500 101.4 °F (38.6 °C)Abnormal  — — 111/71 — — None (Room air) — AB   11/05/20 1225 102.8 °F (39.3 °C)Abnormal  — No hydronephrosis. 0.3 cm nonobstructing right upper pole caliceal calculus. Mild nonspecific bilateral perinephric stranding.   3.7 cm left interpolar renal cyst.  AORTA/VASCULAR:      There is atherosclerotic calcification of the abdominal aorta exten 3:25 PM

## 2020-11-06 NOTE — PHYSICAL THERAPY NOTE
PHYSICAL THERAPY EVALUATION - INPATIENT     Room Number: 338/338-A  Evaluation Date: 11/6/2020  Type of Evaluation: Initial   Physician Order: PT Eval and Treat    Presenting Problem: Alcohol induced pancreatitis  Reason for Therapy: Mobility Dysfunction status  Active Problems:    Alcohol-induced acute pancreatitis      Past Medical History  Past Medical History:   Diagnosis Date   • Acute diverticulitis January 2014   • Colon polyps    • Duodenal ulcer with hemorrhage May 2010    EGD with H. pylori negat extremity ROM and strength are within functional limits 5/5    Lower extremity ROM is within functional limits     Lower extremity strength is within functional limits 5/5    BALANCE  Static Sitting: Good  Dynamic Sitting: Good  Static Standing: Fair +  Dy

## 2020-11-06 NOTE — PAYOR COMM NOTE
--------------  CONTINUED STAY REVIEW    Payor: Rinku Bravo #:  [id number]  Authorization Number: [id number]    Admit date: 11/5/20  Admit time: 1219    Admitting Physician: Keyshawn Boone MD  Attending Physician hard and soft palate. Eyes:  Anicteric sclerae; pupils equal, round, and reactive. NECK:  Supple. No lymphadenopathy. Trachea midline. Full range of motion. LUNGS:  Clear to auscultation bilaterally. Normal respiratory effort.   No intercostal retrac Given 400 mg Oral Chung Ramirez RN      lactated ringers infusion     Date Action Dose Route User    11/6/2020 9354 New [address] [facility] [address] (none) Intravenous Chung Ramirez RN    11/5/2020 1331 New Bag (none) Intravenous Deni Herrera RN      levoFLOXacin in

## 2020-11-06 NOTE — PROGRESS NOTES
[facility]  Hospitalist Progress Note     Tahmina Lezama Patient Status:  Inpatient      63year old [facility] [id number]   Location 338/338-A Attending Wilfredo Sutton MD   Hosp Day # 1 PCP Sukhjinder Roldan MD     ASSESSMENT/PLAN epigastrium in the right upper quadrant. Neuro: Normal reflexes, CN. Sensory/motor exams grossly normal deficit. MS: No joint effusions. No peripheral edema. Skin: Skin is warm and dry. No rashes, erythema, diaphoresis.    Psych: Normal mood and affect

## 2020-11-06 NOTE — PLAN OF CARE
Patient admitted with pancreatitis. NPO. Patient in IVF. Patient c/o dull pain overnight. Patient refusing pain meds. Patient was afebrile last night. Dr. Yolande Camilo notified. Blood cultures drawn. Afebrile this am and vss. Will continue to monitor patient.

## 2020-11-07 NOTE — PLAN OF CARE
Pt in bed a&o x 4 no c/o of pain, temp 100.3 sclera slightly yellow, no c/o of blurred vision, able to do own self care. Remains on c/I still awaiting to collect stool sample to r/o c-diff. Call light in reach, will continue with plan of care.     Problem: monitoring, monitor vital signs, obtain 12 lead EKG if indicated  - Evaluate effectiveness of antiarrhythmic and heart rate control medications as ordered  - Initiate emergency measures for life threatening arrhythmias  - Monitor electrolytes and administe

## 2020-11-07 NOTE — PLAN OF CARE
Patient took multiple walks around unit with mask on; continues to receive IV fluids; does not present withdrawal symptoms outwardly; cooperative and pleasant; skin presents slight yellow coloring; sclera is light yellow; patient tolerated full liquid diet rhythm, and trends  - Monitor for bleeding, hypotension and signs of decreased cardiac output  - Evaluate effectiveness of vasoactive medications to optimize hemodynamic stability  - Monitor arterial and/or venous puncture sites for bleeding and/or hematom

## 2020-11-07 NOTE — PLAN OF CARE
IVF infusing @ 125ml/hr. Pain 3-5/10 - pain controlled with Tylenol PO. Pt declining Morphine. Pt taking small sips of water only. No appetite for clear liquid diet.   Potassium covered per protocol  Pt had 4 loose yellow stools and dark orange urine -

## 2020-11-07 NOTE — PROGRESS NOTES
[facility] - [facility]  Hospitalist Progress Note     Brigida Lezama Patient Status:  Inpatient      63year old [facility] [id number]   Location 338/338-A Attending Tremayne Arias MD   Hosp Day # 2 PCP Judithe Ormond, MD     ASSESSMENT/PLAN epigastrium in the right upper quadrant. Neuro: Normal reflexes, CN. Sensory/motor exams grossly normal deficit. MS: No joint effusions. No peripheral edema. Skin: Skin is warm and dry. No rashes, erythema, diaphoresis.    Psych: Normal mood and affect

## 2020-11-07 NOTE — OCCUPATIONAL THERAPY NOTE
OCCUPATIONAL THERAPY EVALUATION - INPATIENT     Room Number: 338/338-A  Evaluation Date: 11/7/2020  Type of Evaluation: Quick Eval  Presenting Problem: Pancreatitis    Physician Order: IP Consult to Occupational Therapy  Reason for Therapy: ADL/IADL Dysf EGD with H. pylori negative   • Dyslipidemia    • Essential hypertension    • Gastrointestinal hemorrhage 01/2019    Melena; EGD with small hiatal hernia, gastric polyps, mild gastritis, biopsies benign, H. pylori negative; colonoscopy with diverticulos Daily Activity Short Form  How much help from another person does the patient currently need…  -   Putting on and taking off regular lower body clothing?: None  -   Bathing (including washing, rinsing, drying)?: None  -   Toileting, which includes using to

## 2020-11-08 NOTE — PROGRESS NOTES
[facility]  Hospitalist Progress Note     Rere Jeanette Lezama Patient Status:  Inpatient      63year old CSN [id number]   Location 338/338-A Attending Bridgette Lundberg MD   Hosp Day # 3 PCP Jeronimo Galvez MD     ASSESSMENT/PLAN MS: No joint effusions. No peripheral edema. Skin: Skin is warm and dry. No rashes, erythema, diaphoresis. Psych: Normal mood and affect.  Calm, cooperative    Labs:  Recent Labs   Lab 11/05/20  0812 11/06/20  0548   RBC 5.08 4.87   HGB 16.7 16.0   HC

## 2020-11-08 NOTE — PLAN OF CARE
Pt up in chair, a&o x 4, no c/o of pain or distress. C/o of slight abdomen discomfort after eating a meal or snack, no n/v/d noted. Skin and sceleria light yellowish in color, afebrile, received due meds, tolerated well.  Call light in reach, will continue to optimize hemodynamic stability  - Monitor arterial and/or venous puncture sites for bleeding and/or hematoma  - Assess quality of pulses, skin color and temperature  - Assess for signs of decreased coronary artery perfusion - ex.  Angina  - Evaluate flui

## 2020-11-09 NOTE — PLAN OF CARE
Patient cooperative and pleasant; took a few walks throughout shift; tried to \"eat\", but had a hard time tolerating.  Patient refusing mylicon tablets; resting comfortably in bed and chair; ambulating often, call light within reach; will continue to monit hemodynamic stability  - Monitor arterial and/or venous puncture sites for bleeding and/or hematoma  - Assess quality of pulses, skin color and temperature  - Assess for signs of decreased coronary artery perfusion - ex.  Angina  - Evaluate fluid balance, a

## 2020-11-09 NOTE — CONSULTS
[facility]   Gastroenterology Consultation Note    Anastacia Lezama  Patient Status:    Inpatient  Date of Admission:         2020, Hospital day #4  Attending:   Angella Hogan MD  PCP:     Beverly Honeycutt MD    Reason for C small hiatal hernia, gastric polyps, mild gastritis, biopsies benign, H. pylori negative; colonoscopy with diverticulosis and internal hemorrhoids   • GERD (gastroesophageal reflux disease)    • Hepatic steatosis     Ultrasound 1-19   • Lyme disease July 2 **OR** [DISCONTINUED] morphINE sulfate (PF) 4 MG/ML injection 4 mg, 4 mg, Intravenous, Q2H PRN  •  ondansetron HCl (ZOFRAN) injection 4 mg, 4 mg, Intravenous, Q6H PRN  •  Pantoprazole Sodium (PROTONIX) EC tab 40 mg, 40 mg, Oral, QAM AC    Review of Systems  11/09/2020       Imaging:  Xr Chest Ap Portable  (cpt=71045)    Result Date: 11/6/2020  CONCLUSION:  1. No acute cardiopulmonary disease.     Dictated by (CST): Ifeanyi Mosquera MD on 11/06/2020 at 7:43 PM     Finalized by (CST): Anny Allen to rise, chronic liver disease panel (COLUMBA, ASMA, et Ash How) may be warranted + repeat imaging. #acute pancreatitis: lipase now normal, still with epigastric abd pain which is likely multifactorial. Increase fluids to 150cc/hr and CLD today.  He was [facility]

## 2020-11-09 NOTE — PLAN OF CARE
Pt in bed a&ox4 no c/o of pain or distress. Received due med's tolerated well. Afebrile temp 98.1 no c/o of abdomen pain or discomfort, remain on clear liquid diet, denies any n/v/d. Possible discharge to home tomorrow.  Call light in reach, will contuine w medications to optimize hemodynamic stability  - Monitor arterial and/or venous puncture sites for bleeding and/or hematoma  - Assess quality of pulses, skin color and temperature  - Assess for signs of decreased coronary artery perfusion - ex.  Angina  - E

## 2020-11-09 NOTE — PROGRESS NOTES
[facility]  Hospitalist Progress Note     Nalini Senthil Teja Patient Status:  Inpatient      63year old [facility] [id number]   Location 338/338-A Attending Lisandro Munguia MD   Hosp Day # 4 PCP Leigh Mancini MD     ASSESSMENT/PLAN rashes, erythema, diaphoresis. Psych: Normal mood and affect.  Calm, cooperative    Labs:  Recent Labs   Lab 11/05/20  0812 11/06/20  0548 11/09/20  0516   RBC 5.08 4.87 4.41   HGB 16.7 16.0 14.5   HCT 46.9 44.9 40.2   MCV 92.3 92.2 91.2   MCH 32.9 32.9 3

## 2020-11-09 NOTE — PAYOR COMM NOTE
--------------  CONTINUED STAY REVIEW    Payor: Rinku Bravo #:  [id number]  Authorization Number: [id number]    Admit date: 11/5/20  Admit time: 1219    FAXING CLINICAL UPDATE FOR 11/7/20-11/9/20 11/7/20   MACO RDW 11.3 11.6   NEPRELIM 8.45* 11.22*   WBC 10.3 11.7*   .0 132.0*      Lab 11/05/20  0812 11/06/20  0548 11/07/20  0551   * 117* 115*   BUN 11 21* 19*   CREATSERUM 1.01 1.13 0.85   GFRAA 92 81 109   GFRNAA 80 70 94   CA 8.7 8.2* 8.1*   ALB 44.9   MCV 92.3 92.2   MCH 32.9 32.9   MCHC 35.6 35.6   RDW 11.3 11.6   NEPRELIM 8.45* 11.22*   WBC 10.3 11.7*   .0 132.0*      Lab 11/06/20  0548 11/07/20  0551 11/08/20  0531   * 115* 96   BUN 21* 19* 14   CREATSERUM 1.13 0.85 0.80   GFRAA HGB 16.7 16.0 14.5   HCT 46.9 44.9 40.2   MCV 92.3 92.2 91.2   MCH 32.9 32.9 32.9   MCHC 35.6 35.6 36.1   RDW 11.3 11.6 11.8   NEPRELIM 8.45* 11.22* 2.81   WBC 10.3 11.7* 4.8   .0 132.0* 114.0*      Lab 11/07/20  0551 11/08/20  0531 11/09/20  0516

## 2020-11-10 NOTE — PROGRESS NOTES
[facility]  Hospitalist Progress Note     Jil Lezama Patient Status:  Inpatient      63year old CSN [id number]   Location 338/338-A Attending Félix Valderrama MD   Hosp Day # 5 PCP Napoleon Clemente MD     ASSESSMENT/PLAN no tenderness to palpation in the epigastrium in the right upper quadrant. Neuro: Normal reflexes, CN. Sensory/motor exams grossly normal deficit. MS: No joint effusions. No peripheral edema. Skin: Skin is warm and dry.  No rashes, erythema, diaphoresi

## 2020-11-10 NOTE — PLAN OF CARE
Problem: Patient Centered Care  Goal: Patient preferences are identified and integrated in the patient's plan of care  Description: Interventions:  - What would you like us to know as we care for you? I live with my parents.      - Provide timely, complet for edema, trend weights  Outcome: Progressing  Goal: Absence of cardiac arrhythmias or at baseline  Description: INTERVENTIONS:  - Continuous cardiac monitoring, monitor vital signs, obtain 12 lead EKG if indicated  - Evaluate effectiveness of antiarrhyth

## 2020-11-10 NOTE — PAYOR COMM NOTE
--------------  CONTINUED STAY REVIEW    Payor: Rinku Bravo #:  [id number]  Authorization Number: [id number]    Admit date: 11/5/20  Admit time: 1219    Admitting Physician: Randy Overton MD  Attending Physician rub, murmur. Pulm: Effort and breath sounds normal. No distress, wheezes, rales, rhonchi. Abd: Soft, no tenderness to palpation in the epigastrium in the right upper quadrant. Neuro:  Normal reflexes, CN. Sensory/motor exams grossly normal deficit.    MS 2006 Given 5,000 Units Subcutaneous (Right Upper Arm) Jose Turcios RN      lactated ringers infusion     Date Action Dose Route User    11/10/2020 1354 Rate/Dose Change (none) Intravenous Colleen Chappell RN    11/10/2020 0750 New Bag (none) Intr suppression versus ongoing liver disease. Would monitor closely.      Significant elevation in the patient's liver function tests with AST/ALT rising and total bilirubin - repeat labs pending from to.  US 11/5/2020 reported normal with (+) hepatomegaly an case was d/w Dr. Huddleston (GI-on call) + RN [facility]  11/10/2020     ADDENDUM: LFTs improving. Pain has improved - patient d/w Dr. Chirstine Shannon, believes this was \"gas pains\".  Tolerating full liquid diet - can

## 2020-11-10 NOTE — PROGRESS NOTES
[facility]     Gastroenterology Progress Note    Lisa Lezama Patient Status:  Inpatient    1960 MRN [id number]   Location [facility] 3W/SW Attending Jarred Gonzalez MD   Hosp Day # 5 PCP Homer Bean MD radiation to his back, associated nausea/vomiting which resolved the day after admission.  GI evaluation requested 11/9 d/t increasing LFTs and abdominal pain.      11/5 CT c/w mild acute pancreatitis with inflammation with noted mild dc-portal and dc-p - does not seem to follow clear pattern. Current patient does not appear uncomfortable and he denies abd pain.     #hx diarrhea: C. Diff negative, GI PCR in progress.  Diarrhea has resolved.     #heavy ETOH use: cessation advised      #BMI +35/hepatic steat

## 2020-11-11 ENCOUNTER — TELEPHONE (OUTPATIENT)
Dept: GASTROENTEROLOGY | Facility: CLINIC | Age: 60
End: 2020-11-11

## 2020-11-11 DIAGNOSIS — R79.89 ELEVATED LIVER FUNCTION TESTS: Primary | ICD-10-CM

## 2020-11-11 NOTE — TELEPHONE ENCOUNTER
INPATIENT ORDERS:  - Mr. Lezama needs a follow up in 2-3 weeks with Dr. Ami Maria or Gertrudis Wild in the clinic  - I have ordered HFP for 1 week and CC them on this  - needs following of his LFTs

## 2020-11-11 NOTE — TELEPHONE ENCOUNTER
Dr Dottie Robertson and Kelsi JAIN- I spoke to patient at ext [phone] inpatient. States he feels well, denies pain. I placed in [address] [place] [address] Street first available Tues 12/8, at 2pm but want to confirm if this is early enough? Pancreatitis.  Dr Dottie Robertson, alternative is if you wanted

## 2020-11-11 NOTE — TELEPHONE ENCOUNTER
Nursing: I would be happy to see the patient in follow-up. Provided that he is stable and there are no red flag or alarm symptoms, okay to proceed with his currently scheduled appointment.   If the patient prefers to follow-up with Dr. Kasia Stout, that would al

## 2020-11-11 NOTE — PLAN OF CARE
Patient was provided with discharge instructions, education, and follow up information. Prescription for sucralfate was already sent electronically to patient's pharmacy.  Patient verbalizes understanding of follow up information, specifically medication pr optimal cardiac output and hemodynamic stability  Description: INTERVENTIONS:  - Monitor vital signs, rhythm, and trends  - Monitor for bleeding, hypotension and signs of decreased cardiac output  - Evaluate effectiveness of vasoactive medications to optim

## 2020-11-11 NOTE — PROGRESS NOTES
[facility]     Gastroenterology Progress Note    Julieta Lezama Patient Status:  Inpatient    1960 MRN [id number]   Location [facility] 3W/SW Attending Saskia Ramírez MD   [facility] Day # 6 PCP Renny Sutherland MD ongoing liver disease.     Significant elevation in the patient's liver function tests with AST/ALT rising and total bilirubin - down-trending in the last few days. US 11/5/2020 reported normal GB with (+) hepatomegaly and diffuse hepatic steatosis.  [facility] G[facility]  11/11/2020    Results:     Lab Results   Component Value Date    WBC 4.8 11/09/2020    HGB 14.5 11/09/2020    HCT 40.2 11/09/2020    .0 (L) 11/09/2020    CREATSERUM 0.74 11/11/2020    BUN 12 11/11/2020

## 2020-11-11 NOTE — PLAN OF CARE
Pt received awake and alert. Ambulating independently. C/o intermittent stabbing pain to right LQ. Encouraged to continue ambulating. Cleared to discharge by GI. MD aware.     Problem: Patient Centered Care  Goal: Patient preferences are identified and inte puncture sites for bleeding and/or hematoma  - Assess quality of pulses, skin color and temperature  - Assess for signs of decreased coronary artery perfusion - ex.  Angina  - Evaluate fluid balance, assess for edema, trend weights  Outcome: Adequate for Nabil Flaherty

## 2020-11-11 NOTE — PLAN OF CARE
Pt in bed a&o x 4, no c/o of pain or distress no abdominal pain or tenderness, per pt stated \"Feeling much better, able to tolerate meal with no n/v/d. Received due meds tolerated well. Up and about,cont of b/b.  Possible discharge to home tomorrow lab in Evaluate effectiveness of vasoactive medications to optimize hemodynamic stability  - Monitor arterial and/or venous puncture sites for bleeding and/or hematoma  - Assess quality of pulses, skin color and temperature  - Assess for signs of decreased corona

## 2020-11-12 NOTE — TELEPHONE ENCOUNTER
Patient contacted, accepted appt with Dr Cherry Clark Dec 4, arrival 2:45pm and given directions to [facility]. Confirms [facility] . Covid screen done. Only has abdominal pain (pancreatitis). Cancelled 12/8 with Ayo JAIN. Patient will do his liver panel in one week.

## 2020-11-12 NOTE — PAYOR COMM NOTE
--------------  DISCHARGE REVIEW    Payor: Rinku Bravo #:  [id number]  Authorization Number: Sarah Hagen date: 11/5/20  Admit time:  1219  Discharge Date: 11/11/2020 12:57 PM     Admitting Physician: Diann Sellers

## 2020-11-17 ENCOUNTER — LAB ENCOUNTER (OUTPATIENT)
Dept: LAB | Facility: HOSPITAL | Age: 60
End: 2020-11-17
Attending: PHYSICIAN ASSISTANT
Payer: MEDICAID

## 2020-11-17 ENCOUNTER — TELEPHONE (OUTPATIENT)
Dept: GASTROENTEROLOGY | Facility: CLINIC | Age: 60
End: 2020-11-17

## 2020-11-17 DIAGNOSIS — E78.5 DYSLIPIDEMIA: ICD-10-CM

## 2020-11-17 DIAGNOSIS — K21.9 GASTROESOPHAGEAL REFLUX DISEASE: ICD-10-CM

## 2020-11-17 DIAGNOSIS — R79.89 ELEVATED LIVER FUNCTION TESTS: Primary | ICD-10-CM

## 2020-11-17 DIAGNOSIS — K85.20 ALCOHOL-INDUCED ACUTE PANCREATITIS, UNSPECIFIED COMPLICATION STATUS: ICD-10-CM

## 2020-11-17 DIAGNOSIS — R79.89 ELEVATED LIVER FUNCTION TESTS: ICD-10-CM

## 2020-11-17 PROCEDURE — 36415 COLL VENOUS BLD VENIPUNCTURE: CPT

## 2020-11-17 PROCEDURE — 80053 COMPREHEN METABOLIC PANEL: CPT

## 2020-11-17 PROCEDURE — 80061 LIPID PANEL: CPT

## 2020-11-17 PROCEDURE — 82248 BILIRUBIN DIRECT: CPT

## 2020-11-17 PROCEDURE — 85027 COMPLETE CBC AUTOMATED: CPT

## 2020-11-17 NOTE — TELEPHONE ENCOUNTER
Thank you, Negrito Melchor, for the detailed review with Mr. Jadon Busch. I believe he has an upcoming appointment with Dr. Maco Lewis which he should keep. Concur with the below recommendations with additional recommendations pending follow-up/office visit. I would be happy to assist in any way I can.

## 2020-11-17 NOTE — TELEPHONE ENCOUNTER
Hepatic function panel shows liver tests are equivocal to approximately 1 week ago aside from slightly decreased total bilirubin. I called Mr. Nilesh Larios and left a generic message on his home phone. When I called his listed mobile, a family member picked up and gave the phone to the patient who verified his . His liver function tests remain elevated, only bilirubin is down slightly. He also has thrombocytosis which is new - possibly reactive versus other cause. He has a follow up appt with his PCP on Friday to review this. I reviewed the gallbladder US from :    =====  CONCLUSION:   1. Normal gallbladder ultrasound. 2. Hepatomegaly with diffuse hepatic steatosis. He has no abdominal pain, has not taken any pain medications since discharge from the hospital and denies \"pancreatitis-like\" pain. A short-term repeat CT may be reasonable in light of his CT findings on admission. He denies ETOH use since discharge as well. I would recommend another repeat HFP in 1 week which I ordered and asked the patient to complete. If he has any worsening GI issues, he was advised to call us at the office. His liver function tests may not be declining quickly in light of his underlying liver disease. I have urged Kwame Majano to remain abstinent from alcohol and if his liver function tests do not improve, further testing may be warranted.

## 2020-11-18 PROBLEM — K85.20: Status: ACTIVE | Noted: 2020-11-01

## 2020-11-18 PROBLEM — K85.20 ALCOHOLIC PANCREATITIS: Status: ACTIVE | Noted: 2020-11-01

## 2020-11-19 NOTE — DISCHARGE SUMMARY
[facility] HOSPITALIST  DISCHARGE SUMMARY     Mortimer Bruins Hammerschmidt Patient Status:  Inpatient    1960 MRN [id number]   Location [facility] 3W/SW Attending No att. providers found   [facility] Day # 6 PCP Deacon Bridges MD     Date of Admission: 6 Annie Hu     Other problems  Obesity with a BMI of 33  Possible obstructive sleep apnea  Borderline diabetes  Hypertension  Dyslipidemia  GERD  History of diverticulosis  History of peptic ulcer disease     ----------------------------------  diet: Amy [id number]    Phone: [phone]   · sucralfate 1 GM/10ML Susp         Follow-up appointment:   Mikki Conn MD  [address] [place] [address]  [phone]    Schedule an appointment as soon as possible for a visit in 1 week  for

## 2020-11-20 NOTE — PATIENT INSTRUCTIONS
You received a flu shot today. Continue current medication. You may resume taking Prilosec daily. Repeat blood test next week with GI appointment scheduled December 4. Continue to completely refrain from drinking alcohol. Return visit in 3 months.

## 2020-11-20 NOTE — PROGRESS NOTES
Keerthi Connell is a 63year old male. Patient presents with:  Hospital F/U    HPI:   Mr. Chepe Guan presents this morning for hospital follow-up.     On November 5 he had the sudden incident of nausea, vomiting and epigastric pain radiating to h tolerating both solids and liquids in his diet without difficulty. Occasional mild nausea but no recurrent vomiting. Frequent heartburn but no dysphagia. No abdominal pain. Stools normal without diarrhea constipation or rectal bleeding. No fever.   No • Gastrointestinal hemorrhage 01/2019    Melena; EGD with small hiatal hernia, gastric polyps, mild gastritis, biopsies benign, H. pylori negative; colonoscopy with diverticulosis and internal hemorrhoids   • GERD (gastroesophageal reflux disease)    • H today.    2. Abnormal LFTs  Likely secondary to alcohol, recently stable. Await repeat hepatic function panel next week with GI appointment scheduled.     3. Essential hypertension  Well-controlled on current medication      The patient indicates Aruna Clements

## 2020-11-24 ENCOUNTER — LAB ENCOUNTER (OUTPATIENT)
Dept: LAB | Facility: HOSPITAL | Age: 60
End: 2020-11-24
Attending: PHYSICIAN ASSISTANT
Payer: MEDICAID

## 2020-11-24 DIAGNOSIS — R79.89 ELEVATED LIVER FUNCTION TESTS: ICD-10-CM

## 2020-11-24 PROCEDURE — 80076 HEPATIC FUNCTION PANEL: CPT

## 2020-11-24 PROCEDURE — 36415 COLL VENOUS BLD VENIPUNCTURE: CPT

## 2020-12-04 NOTE — PATIENT INSTRUCTIONS
Alcoholic hepatitis   - repeat lab work next week   - stay off alcohol  - work on healthy foods and exercise     Pancreatitis   - repeat CT scan in 2 weeks to make sure pancreas healed

## 2020-12-10 ENCOUNTER — TELEPHONE (OUTPATIENT)
Dept: CASE MANAGEMENT | Age: 60
End: 2020-12-10

## 2020-12-10 ENCOUNTER — LAB ENCOUNTER (OUTPATIENT)
Dept: LAB | Facility: HOSPITAL | Age: 60
End: 2020-12-10
Attending: INTERNAL MEDICINE
Payer: MEDICAID

## 2020-12-10 DIAGNOSIS — K85.20 ALCOHOL-INDUCED ACUTE PANCREATITIS WITHOUT INFECTION OR NECROSIS: ICD-10-CM

## 2020-12-10 PROCEDURE — 85610 PROTHROMBIN TIME: CPT

## 2020-12-10 PROCEDURE — 36415 COLL VENOUS BLD VENIPUNCTURE: CPT

## 2020-12-10 PROCEDURE — 80076 HEPATIC FUNCTION PANEL: CPT

## 2020-12-10 NOTE — PROGRESS NOTES
Rudy Morton is a 63year old male. HPI:   Patient presents with:   Follow - Up      The patient is a 18-year-old male follows up in the office today, he has a history of diverticulitis, pancreatitis, alcoholic hepatitis with steatosis, Lyme's Lary Benito MD at [address] [place] [address] ENDOSCOPY   • ESOPHAGOGASTRODUODENOSCOPY (EGD) N/A 1/21/2019    Performed by Rere Downing MD at [address] [place] [address] ENDOSCOPY      Family History   Problem Relation Age of Onset   • Diabetes Father    • Hypertension Father    • Heart Disease Fath lesions  Chest- Clear bilaterally, no wheezing,  Heart- regular rate, no murmur or gallop  Abdomen- Soft and nontender, nondistended  Ext- no clubbing or cyanosis  Skin- no rashes or lesions  Neuro- appropriate response, alert, no confusion  .   ASSESSMENT/

## 2020-12-11 NOTE — TELEPHONE ENCOUNTER
Discussed with Dr. Mel Gavin. He is aware of request below.
Dr. Modesta Miles,    Can you please close your OV notes from 12/4/20 so I may obtain authorization for Alejandro's CT.     He is scheduled 12/15/20    Thank you  Ankit Jameson
John Alford it seems note from 12/4/20 has been completed.
Will await response from Dr. Yamilka Cortes.
present

## 2020-12-16 ENCOUNTER — TELEPHONE (OUTPATIENT)
Dept: CASE MANAGEMENT | Age: 60
End: 2020-12-16

## 2020-12-19 ENCOUNTER — PATIENT MESSAGE (OUTPATIENT)
Dept: GASTROENTEROLOGY | Facility: CLINIC | Age: 60
End: 2020-12-19

## 2020-12-20 ENCOUNTER — HOSPITAL ENCOUNTER (INPATIENT)
Facility: HOSPITAL | Age: 60
LOS: 4 days | Discharge: HOME HEALTH | DRG: 871 | End: 2020-12-24
Attending: EMERGENCY MEDICINE | Admitting: HOSPITALIST
Payer: MEDICAID

## 2020-12-20 ENCOUNTER — APPOINTMENT (OUTPATIENT)
Dept: ULTRASOUND IMAGING | Facility: HOSPITAL | Age: 60
DRG: 871 | End: 2020-12-20
Attending: EMERGENCY MEDICINE
Payer: MEDICAID

## 2020-12-20 ENCOUNTER — APPOINTMENT (OUTPATIENT)
Dept: GENERAL RADIOLOGY | Facility: HOSPITAL | Age: 60
DRG: 871 | End: 2020-12-20
Attending: EMERGENCY MEDICINE
Payer: MEDICAID

## 2020-12-20 ENCOUNTER — APPOINTMENT (OUTPATIENT)
Dept: MRI IMAGING | Facility: HOSPITAL | Age: 60
DRG: 871 | End: 2020-12-20
Attending: EMERGENCY MEDICINE
Payer: MEDICAID

## 2020-12-20 DIAGNOSIS — R50.9 FEVER, UNSPECIFIED FEVER CAUSE: Primary | ICD-10-CM

## 2020-12-20 DIAGNOSIS — K75.9 HEPATITIS: ICD-10-CM

## 2020-12-20 PROCEDURE — 76705 ECHO EXAM OF ABDOMEN: CPT | Performed by: EMERGENCY MEDICINE

## 2020-12-20 PROCEDURE — 76376 3D RENDER W/INTRP POSTPROCES: CPT | Performed by: EMERGENCY MEDICINE

## 2020-12-20 PROCEDURE — 71045 X-RAY EXAM CHEST 1 VIEW: CPT | Performed by: EMERGENCY MEDICINE

## 2020-12-20 PROCEDURE — 74183 MRI ABD W/O CNTR FLWD CNTR: CPT | Performed by: EMERGENCY MEDICINE

## 2020-12-20 PROCEDURE — 99223 1ST HOSP IP/OBS HIGH 75: CPT | Performed by: HOSPITALIST

## 2020-12-20 RX ORDER — ACETAMINOPHEN 500 MG
1000 TABLET ORAL ONCE
Status: COMPLETED | OUTPATIENT
Start: 2020-12-20 | End: 2020-12-20

## 2020-12-20 RX ORDER — BISACODYL 10 MG
10 SUPPOSITORY, RECTAL RECTAL
Status: DISCONTINUED | OUTPATIENT
Start: 2020-12-20 | End: 2020-12-24

## 2020-12-20 RX ORDER — SODIUM CHLORIDE, SODIUM LACTATE, POTASSIUM CHLORIDE, CALCIUM CHLORIDE 600; 310; 30; 20 MG/100ML; MG/100ML; MG/100ML; MG/100ML
INJECTION, SOLUTION INTRAVENOUS CONTINUOUS
Status: DISCONTINUED | OUTPATIENT
Start: 2020-12-20 | End: 2020-12-24

## 2020-12-20 RX ORDER — MORPHINE SULFATE 4 MG/ML
4 INJECTION, SOLUTION INTRAMUSCULAR; INTRAVENOUS EVERY 2 HOUR PRN
Status: DISCONTINUED | OUTPATIENT
Start: 2020-12-20 | End: 2020-12-24

## 2020-12-20 RX ORDER — HYDROCODONE BITARTRATE AND ACETAMINOPHEN 5; 325 MG/1; MG/1
1 TABLET ORAL EVERY 4 HOURS PRN
Status: DISCONTINUED | OUTPATIENT
Start: 2020-12-20 | End: 2020-12-24

## 2020-12-20 RX ORDER — MORPHINE SULFATE 4 MG/ML
4 INJECTION, SOLUTION INTRAMUSCULAR; INTRAVENOUS ONCE
Status: DISCONTINUED | OUTPATIENT
Start: 2020-12-20 | End: 2020-12-24

## 2020-12-20 RX ORDER — ONDANSETRON 2 MG/ML
4 INJECTION INTRAMUSCULAR; INTRAVENOUS ONCE
Status: COMPLETED | OUTPATIENT
Start: 2020-12-20 | End: 2020-12-20

## 2020-12-20 RX ORDER — KETOROLAC TROMETHAMINE 15 MG/ML
15 INJECTION, SOLUTION INTRAMUSCULAR; INTRAVENOUS ONCE
Status: COMPLETED | OUTPATIENT
Start: 2020-12-20 | End: 2020-12-20

## 2020-12-20 RX ORDER — OMEPRAZOLE 20 MG/1
20 CAPSULE, DELAYED RELEASE ORAL
COMMUNITY

## 2020-12-20 RX ORDER — METRONIDAZOLE 500 MG/100ML
500 INJECTION, SOLUTION INTRAVENOUS EVERY 8 HOURS
Status: DISCONTINUED | OUTPATIENT
Start: 2020-12-20 | End: 2020-12-21

## 2020-12-20 RX ORDER — CIPROFLOXACIN 2 MG/ML
400 INJECTION, SOLUTION INTRAVENOUS EVERY 12 HOURS
Status: DISCONTINUED | OUTPATIENT
Start: 2020-12-21 | End: 2020-12-21

## 2020-12-20 RX ORDER — HYDROCODONE BITARTRATE AND ACETAMINOPHEN 5; 325 MG/1; MG/1
2 TABLET ORAL EVERY 4 HOURS PRN
Status: DISCONTINUED | OUTPATIENT
Start: 2020-12-20 | End: 2020-12-24

## 2020-12-20 RX ORDER — POLYETHYLENE GLYCOL 3350 17 G/17G
17 POWDER, FOR SOLUTION ORAL DAILY PRN
Status: DISCONTINUED | OUTPATIENT
Start: 2020-12-20 | End: 2020-12-24

## 2020-12-20 RX ORDER — DOCUSATE SODIUM 100 MG/1
100 CAPSULE, LIQUID FILLED ORAL 2 TIMES DAILY
Status: DISCONTINUED | OUTPATIENT
Start: 2020-12-20 | End: 2020-12-24

## 2020-12-20 RX ORDER — METRONIDAZOLE 500 MG/100ML
500 INJECTION, SOLUTION INTRAVENOUS ONCE
Status: COMPLETED | OUTPATIENT
Start: 2020-12-20 | End: 2020-12-20

## 2020-12-20 RX ORDER — MORPHINE SULFATE 4 MG/ML
4 INJECTION, SOLUTION INTRAMUSCULAR; INTRAVENOUS ONCE
Status: COMPLETED | OUTPATIENT
Start: 2020-12-20 | End: 2020-12-20

## 2020-12-20 RX ORDER — ACETAMINOPHEN 325 MG/1
650 TABLET ORAL EVERY 4 HOURS PRN
Status: DISCONTINUED | OUTPATIENT
Start: 2020-12-20 | End: 2020-12-24

## 2020-12-20 RX ORDER — MORPHINE SULFATE 2 MG/ML
2 INJECTION, SOLUTION INTRAMUSCULAR; INTRAVENOUS EVERY 2 HOUR PRN
Status: DISCONTINUED | OUTPATIENT
Start: 2020-12-20 | End: 2020-12-24

## 2020-12-20 RX ORDER — ONDANSETRON 2 MG/ML
4 INJECTION INTRAMUSCULAR; INTRAVENOUS EVERY 6 HOURS PRN
Status: DISCONTINUED | OUTPATIENT
Start: 2020-12-20 | End: 2020-12-24

## 2020-12-20 RX ORDER — LORAZEPAM 2 MG/ML
1 INJECTION INTRAMUSCULAR ONCE
Status: COMPLETED | OUTPATIENT
Start: 2020-12-20 | End: 2020-12-20

## 2020-12-20 RX ORDER — MORPHINE SULFATE 2 MG/ML
1 INJECTION, SOLUTION INTRAMUSCULAR; INTRAVENOUS EVERY 2 HOUR PRN
Status: DISCONTINUED | OUTPATIENT
Start: 2020-12-20 | End: 2020-12-24

## 2020-12-20 RX ORDER — SODIUM PHOSPHATE, DIBASIC AND SODIUM PHOSPHATE, MONOBASIC 7; 19 G/133ML; G/133ML
1 ENEMA RECTAL ONCE AS NEEDED
Status: DISCONTINUED | OUTPATIENT
Start: 2020-12-20 | End: 2020-12-24

## 2020-12-20 RX ORDER — CIPROFLOXACIN 2 MG/ML
400 INJECTION, SOLUTION INTRAVENOUS ONCE
Status: COMPLETED | OUTPATIENT
Start: 2020-12-20 | End: 2020-12-20

## 2020-12-20 NOTE — ED NOTES
Ana Miranda arrived through triage for c/o R upper quadrant abdominal pain since Friday night 12/18. Also reports n/v and back pain. Recent hx of pancreatitis. States he has not consumed any alcoholic beverages since 17/0. Urine is cale-colored.  + scleral ict

## 2020-12-20 NOTE — ED PROVIDER NOTES
Patient Seen in: [facility] AND [facility] Emergency Department      History   Patient presents with:  Abdomen/Flank Pain  Fever    Stated Complaint: diverticulitis flare up     HPI    62-year-old male presents for evaluation of fever and abdominal pain.   Omar tenderness in the right upper quadrant. Musculoskeletal: Normal range of motion. Skin:     General: Skin is warm and dry. Findings: No rash. Neurological:      General: No focal deficit present.       Mental Status: He is alert and oriented to pe CBC WITH DIFFERENTIAL WITH PLATELET.   Procedure                               Abnormality         Status                     ---------                               -----------         ------                     CBC W/ DIFFERENTIAL[[id number]]          Abno enhancing lesion, or evidence     of acute pancreatitis. OTHER FINDINGS:     LIVER: Liver is enlarged at 21.5 cm in length. Diffuse signal loss of the     liver on opposed phase imaging compatible with fatty infiltration.   No     suspicious enhanc Jeanie Jay MD on 12/20/2020 at 4:23 PM               XR CHEST AP PORTABLE  (CPT=71045)   Final Result    PROCEDURE: XR CHEST AP PORTABLE  (CPT=71045)    TIME: 1:25 p.m..            COMPARISON: [facility], XR CHEST AP PORTABLE (CPT=71045), Hepatic steatosis. 2.  Normal ultrasound appearance of the gallbladder. No cholelithiasis,     evidence of acute cholecystitis, or biliary ductal dilatation.               Dictated by (CST): Claudia Garcia MD on 12/20/2020 at 12:45 PM         Finalized b metRONIDAZOLE in NaCl (FLAGYL) IVPB premix 500 mg (0 mg Intravenous Stopped 12/20/20 1355)   Ciprofloxacin in D5W (CIPRO) IVPB premix SOLN 400 mg (0 mg Intravenous Hold 12/20/20 1436)   sodium chloride 0.9% IV bolus 1,000 mL (0 mL Intravenous Stopped 12/ Encounter      ED Consult to Gastroenterology      ED Consult to Hospitalist      Consult to Infectious Disease      Consult to General Surgery      MD Discussions or Sign-Outs: Discussed with Dr. Jairo Melchor, agrees with plan for MRCP, also recommends mono and h Admission  Date Reviewed: 12/10/2020          ICD-10-CM Noted POA    * (Principal) Fever, unspecified fever cause R50.9 12/20/2020     Fever R50.9 12/20/2020 Unknown    Hepatitis K75.9 12/20/2020

## 2020-12-20 NOTE — ED NOTES
Orders for admission, patient is aware of plan and ready to go upstairs. Any questions, please call ED PAMELLA Hernández  at extension [phone].    Type of COVID test sent: Rapid &   COVID Suspicion level: High    Titratable drug(s) infusing: Cipro infusing  Rate:

## 2020-12-20 NOTE — ED NOTES
Verbal report called to PAMELLA Baltazar on 5W. Pt will be transported from MRI to the floor. He is alert and oriented, no c/o abdominal pain at time of departure from ED. Cipro placed on hold, 0.9 liter #2 continued.

## 2020-12-21 ENCOUNTER — TELEPHONE (OUTPATIENT)
Dept: GASTROENTEROLOGY | Facility: CLINIC | Age: 60
End: 2020-12-21

## 2020-12-21 ENCOUNTER — APPOINTMENT (OUTPATIENT)
Dept: NUCLEAR MEDICINE | Facility: HOSPITAL | Age: 60
DRG: 871 | End: 2020-12-21
Attending: SURGERY
Payer: MEDICAID

## 2020-12-21 PROCEDURE — 99253 IP/OBS CNSLTJ NEW/EST LOW 45: CPT | Performed by: INTERNAL MEDICINE

## 2020-12-21 PROCEDURE — 99233 SBSQ HOSP IP/OBS HIGH 50: CPT | Performed by: HOSPITALIST

## 2020-12-21 PROCEDURE — 99253 IP/OBS CNSLTJ NEW/EST LOW 45: CPT | Performed by: SURGERY

## 2020-12-21 PROCEDURE — 78226 HEPATOBILIARY SYSTEM IMAGING: CPT | Performed by: SURGERY

## 2020-12-21 RX ORDER — MAGNESIUM OXIDE 400 MG (241.3 MG MAGNESIUM) TABLET
400 TABLET ONCE
Status: COMPLETED | OUTPATIENT
Start: 2020-12-21 | End: 2020-12-21

## 2020-12-21 RX ORDER — PANTOPRAZOLE SODIUM 40 MG/1
40 TABLET, DELAYED RELEASE ORAL
Status: DISCONTINUED | OUTPATIENT
Start: 2020-12-22 | End: 2020-12-24

## 2020-12-21 RX ORDER — POTASSIUM CHLORIDE 14.9 MG/ML
20 INJECTION INTRAVENOUS ONCE
Status: COMPLETED | OUTPATIENT
Start: 2020-12-21 | End: 2020-12-21

## 2020-12-21 NOTE — PROGRESS NOTES
Surgery consult    Consult was dictated  #Possible cholecystitis, total bilirubin greater than 6 not consistent with cholecystitis  -GI to see and evaluate.  -Further recommendations to follow

## 2020-12-21 NOTE — TELEPHONE ENCOUNTER
Dr. Julia Guzman    Please see below message. Patient wanted to inform you he is  admitted to the hospital due to abdominal pain and fever. We were trying to appeal a CT on this patient (do you still need this since he had MRI?).   He had an MRI of abdomen yeste

## 2020-12-21 NOTE — PROGRESS NOTES
Surgery addendum    HIDA scan report is back. Scan consistent with hepatitis with no excretion of technetium into bile ducts or gallbladder. Continue IV antibiotics for bacteremia. Consider cholecystostomy tube if antibiotics are unsuccessful.   I will c

## 2020-12-21 NOTE — H&P (VIEW-ONLY)
[facility]    PATIENT'S NAME: PERLA WHITE   ATTENDING PHYSICIAN: Tiffany Kilpatrick MD   CONSULTING PHYSICIAN: Quinton Joseph MD   PATIENT ACCOUNT#:   [id number]    LOCATION:  [address] RECORD #:   [id number]       DATE OF TOÑA [facility]. [id number]  GT/

## 2020-12-21 NOTE — PROGRESS NOTES
[facility]    Progress Note    Claudine Lezama Patient Status:  Inpatient    1960 MRN [id number]   Location [address] [place] [address] Attending Nereida Kilgore, [facility] Day # 1 PCP Terry Shin MD       Subjective:   Claudine Salgado 2.  Normal ultrasound appearance of the gallbladder. No cholelithiasis, evidence of acute cholecystitis, or biliary ductal dilatation.    Dictated by (CST): Geena Stewart MD on 12/20/2020 at 12:45 PM     Finalized by (CST): Geena Stewart MD on 12/20/2020 at transaminases/Bacteremia  Likely related to acute cholecystitis  MRCP reviewed  Plan cover with Cipro Flagyl--> meropenem with bacteremia  Plan surgical evaluation  Given high fever will check Covid PCR  Rapid Covid negative  Had febrile illness last admis

## 2020-12-21 NOTE — PAYOR COMM NOTE
--------------  ADMISSION REVIEW     Payor: Rinku Zamarripaanne #:  [id number]  Authorization Number: [id number]      Admit date: 12/20/20  Admit time: 18       Admitting Physician: Marie Dickerson MD  Attending Physi Vitals signs and nursing note reviewed. Constitutional:       General: He is not in acute distress. Appearance: He is well-developed. HENT:      Head: Normocephalic and atraumatic.    Eyes:      Conjunctiva/sclera: Conjunctivae normal.   Neck: MONONUCLEOSIS, QUAL - Abnormal; Notable for the following components:    Infectious Mono Test Positive (*)     All other components within normal limits   CBC W/ DIFFERENTIAL - Abnormal; Notable for the following components:    Neutrophil Absolute Prelim 9 gadolinium infusion. Magnetic resonance cholangiopancreatography was also performed to     evaluate the biliary tree and pancreatic duct. MRCP FINDINGS:      BILIARY:   Gallbladder has a physiologic degree of distention.   There is gallbladder neck. There is also gallbladder wall thickening. No intra or     extrahepatic biliary ductal dilatation or divisum. Findings are     nonspecific but raise the possibility of     cholecystitis.   Given recent gallbladder ultrasound findings COMPARISON: [address] [place] [address] (QBM=[id number]),     11/05/2020, 8:54 AM.         INDICATIONS: Abdominal pain         TECHNIQUE:   The gallbladder was evaluated with grayscale ultrasound.            FINDINGS:     GALLBLADDER:   Normal/p PEG 3350 (MIRALAX) powder packet 17 g (has no administration in time range)   magnesium hydroxide (MILK OF MAGNESIA) 400 MG/5ML suspension 30 mL (has no administration in time range)   bisacodyl (DULCOLAX) rectal suppository 10 mg (has no administration in Weight:   106.6 kg    Height:   182.9 cm (6')      *I personally reviewed and interpreted all ED vitals.       MDM      Differential Diagnosis includes but is not limited to: Cholecystitis, choledocholithiasis, peptic ulcer disease, Covid, pancreatitis    L Further Inpatient evaluation and treatment will be required.  I personally discussed the results of the above ED workup and a number of associated acute management issues with the patient, and I explained the need for further follow-up evaluation and treatm Justine Monahan is a(n) 63year old male. He was recently hospitalized between 11/5 to 11/11. He was hospitalized due to alcohol induced pancreatitis. He notes that he is now allergic to alcohol and has not had alcohol since 11/4.   He now presen • COLONOSCOPY  2010    4th colon; Dr. Antwon Callahan   • COLONOSCOPY     • COLONOSCOPY N/A 1/23/2019    Performed by Shantel Mendez MD at [address] [place] [address] ENDOSCOPY   • COLONOSCOPY N/A 3/5/2018    Performed by Shantel Mendez MD at [address] [place] [address] ENDOSCOPY   • [facility] Blood pressure 105/56, pulse 100, temperature 99.3 °F (37.4 °C), temperature source Oral, resp. rate (!) 28, height 6' (1.829 m), weight 235 lb (106.6 kg), SpO2 95 %. GENERAL:  The patient appeared to be in no distress. Lying in bed, comfortably.   Dayron Result Date: 12/20/2020  CONCLUSION: No acute cardiopulmonary abnormality.     Dictated by (CST): Claudia Garcia MD on 12/20/2020 at 1:37 PM     Finalized by (CST): Claudia Garcia MD on 12/20/2020 at 1:38 PM          Mri Abdomen&mrcp W/3d (w+w0)(cpt=[id number] Author: Espinoza Patricia MD Service: General Surgery Author Type: Physician   Filed: 12/21/2020 12:36 PM Status: Signed   : Espinoza Patricia MD (Physician)   [facility]     PATIENT'S NAME: PERLA WHITE   ATTENDING PHYSICIAN: Nadira Banks IMPRESSION:  Unlikely acute cholecystitis. ? Alcoholic hepatitis. GI to see and evaluate.     PLAN:  HIDA scan for today.   Further recommendations to follow.     Dictated By Ebony Sutherland MD  d:     12/21/2020 08:29:14  t:      12/21/2020 08:47:43  Job Pertinent Family Hx:  - No known history of esophageal, gastric or colon cancers  + GB disease in brother (cholecystectomy at age 36)     Endoscopy Hx:  - 1/23/2019 CLN- flecks of old blood in distal colon, diverticular disease of sigmoid and descending co • Breast Cancer Paternal Grandmother     • Cancer Paternal Grandmother           breast cancer       reports that he has never smoked. He has never used smokeless tobacco. He reports current alcohol use of about 8.0 standard drinks of alcohol per week.  He GENITOURINARY:  negative for dysuria or gross hematuria  INTEGUMENT/BREAST:  SKIN:  negative for jaundice   ALLERGIC/IMMUNOLOGIC:  negative for hay fever   ENDOCRINE:  negative for cold intolerance and heat intolerance  MUSCULOSKELETAL:  negative for joint CONCLUSION:  1. Hepatomegaly. Hepatic steatosis. 2.  Normal ultrasound appearance of the gallbladder. No cholelithiasis, evidence of acute cholecystitis, or biliary ductal dilatation.    Dictated by (CST): Tonja Santos MD on 12/20/2020 at 12:45 PM     Fi Filed: 12/21/2020  2:07 PM Date of Service: 12/21/2020  9:40 AM Status: Cosign Needed   : Bri Childress (Physician Assistant) Cosign Required: Yes   Consult Orders   1.  Consult to Infectious Disease [[id number]] ordered by Silvia Dueñas MD • Lyme disease July 2010     Erythema migrans, treated            Past Surgical History:   Procedure Laterality Date   • COLONOSCOPY   2010 4th colon; Dr. Russell Ortiz   • COLONOSCOPY       • COLONOSCOPY N/A 1/23/2019     Performed by Maria Guadalupe Dove MD at •  morphINE sulfate (PF) 2 MG/ML injection 1 mg, 1 mg, Intravenous, Q2H PRN **OR** morphINE sulfate (PF) 2 MG/ML injection 2 mg, 2 mg, Intravenous, Q2H PRN **OR** morphINE sulfate (PF) 4 MG/ML injection 4 mg, 4 mg, Intravenous, Q2H PRN  •  docusate sodium RBC 4.09*   HGB 13.4   HCT 36.9*   MCV 90.2   MCH 32.8   MCHC 36.3   RDW 12.3   NEPRELIM 8.20*   WBC 9.4   .0*           Recent Labs   Lab 12/20/20  1139 12/21/20  0524   * 97   BUN 13 20*   CREATSERUM 1.16 1.01   GFRAA 78 92   GFRNAA 68 80 Date of Service:  2020  2:27 PM               [address]     Progress Note           [address] Patient Status:  Inpatient    1960 MRN [id number]   Location [address] [place] [address] Attending Alexis Daniel   ETOH <3 12/20/2020         Us Gallbladder (cpt=76705)     Result Date: 12/20/2020  CONCLUSION:  1. Hepatomegaly. Hepatic steatosis. 2.  Normal ultrasound appearance of the gallbladder.   No cholelithiasis, evidence of acute cholecystitis, or biliary flaca CONCLUSION:  1. MRCP demonstrates cholelithiasis. The stones are located within the gallbladder neck. There is also gallbladder wall thickening. No intra or extrahepatic biliary ductal dilatation or divisum.   Findings are nonspecific but raise the poss Date Action Dose Route User    12/21/2020 0147 New Bag 400 mg Intravenous frank Sears RN      docusate sodium (COLACE) cap 100 mg     Date Action Dose Route User    12/20/2020 2004 Given 100 mg Oral frank Sears RN      Gadoterate Meglumine (DOTA

## 2020-12-21 NOTE — TELEPHONE ENCOUNTER
Patient called in stating that he is currently admitted to the hospital due to 105 degree fever and abdominal pain. Patient states it may be an infection. He is requesting to speak to Dr. Ketty Bradford or Rosendo Cavazos if possible.  He states Rosendo Cavazos helped him at

## 2020-12-21 NOTE — CONSULTS
[place] [facility] ID CONSULT NOTE    Julieta Lezama Patient Status:  Inpatient    1960 MRN [id number]   Location [facility] Attending Danielito Moralez MD   Hosp Day # 1 PCP Renny Sutherland MD       Reason for Performed by Raimundo Corbett MD at [facility] ENDOSCOPY   • ESOPHAGOGASTRODUODENOSCOPY (EGD) N/A 1/21/2019    Performed by Raimundo Corbett MD at [facility] ENDOSCOPY     Family History   Problem Relation Age of Onset   • Diabetes Father    • Hypertension Father •  Fleet Enema (FLEET) 7-19 GM/118ML enema 133 mL, 1 enema, Rectal, Once PRN  •  ondansetron HCl (ZOFRAN) injection 4 mg, 4 mg, Intravenous, Q6H PRN    Review of Systems:  CONSTITUTIONAL:  +fevers  HEENT:  Eyes:  No visual loss, blurred vision, double visi 63year old male with a history of HTN, hospitalization 1/5 to 11/11 for ETOH pancreatitis, who presented to [facility] ED on 12/20 for RUQ pain. Patient states that he has not had any alcohol since last admission.  Notes some constipation and RUQ pain started on

## 2020-12-21 NOTE — CONSULTS
[facility]   Gastroenterology Consultation Note    Lequita Class Hammerschmidt  Patient Status:    Inpatient  Date of Admission:         12/20/2020, Hospital day #1  Attending:   Edwin Brothers MD  PCP:     Allyson Mckeon MD    Reason fo pylori negative   • Dyslipidemia    • Essential hypertension    • Gastrointestinal hemorrhage 01/2019    Melena; EGD with small hiatal hernia, gastric polyps, mild gastritis, biopsies benign, H. pylori negative; colonoscopy with diverticulosis and internal HYDROcodone-acetaminophen (NORCO) 5-325 MG per tab 1 tablet, 1 tablet, Oral, Q4H PRN **OR** HYDROcodone-acetaminophen (NORCO) 5-325 MG per tab 2 tablet, 2 tablet, Oral, Q4H PRN  •  morphINE sulfate (PF) 2 MG/ML injection 1 mg, 1 mg, Intravenous, Q2H PRN ** breathing  Abdomen: soft, non-tender exam in all quadrants without rigidity or guarding, non-distended, no abnormal bowel sounds noted, no masses are palpated  Back: No CVA tenderness   Skin: dry, warm, no jaundice  Ext: no cyanosis, clubbing or edema is e mm diameter periampullary duodenal diverticulum which could also contribute to these findings. 2.  Hepatomegaly. Hepatic steatosis. 3.  Renal cysts.   There is a 38 mm diameter cyst in the lateral midpole of the left kidney compatible with Bosniak 2F cyst.

## 2020-12-21 NOTE — TELEPHONE ENCOUNTER
Patient is currently inpatient. I already forwarded a telephone encounter to Dr. Rehan Garza him of this today (see telephone encounter from 12/21/2020).

## 2020-12-21 NOTE — TELEPHONE ENCOUNTER
From: Julieta Lezaam  To: Meghana Salgado. Selene Littlejohn MD  Sent: 12/19/2020 10:38 AM CST  Subject: Other    ihave a 102 fever. what pain pills can i take. other symptoms are a musle pains.  last night i had a painful bloted belley under right ribe cage that n

## 2020-12-21 NOTE — CONSULTS
[facility]    PATIENT'S NAME: PERLA WHITE   ATTENDING PHYSICIAN: Andrade Alatorre MD   CONSULTING PHYSICIAN: Manav Chandler MD   PATIENT ACCOUNT#:   [id number]    LOCATION:  [address] RECORD #:   [id number]       DATE OF TOÑA 10 [facility]. [id number]  /

## 2020-12-21 NOTE — H&P
ShawnBankspacheco Patient Status:  Inpatient    1960 MRN [id number]   Location [facility] Attending Shira Salcido MD   Hosp Day # 0 PCP Karyn Felix MD     Date:  2020  Da with small hiatal hernia, gastric polyps, mild gastritis, biopsies benign, H. pylori negative; colonoscopy with diverticulosis and internal hemorrhoids   • GERD (gastroesophageal reflux disease)    • Hepatic steatosis     Ultrasound 1-19   • High blood pre tablet    •  Multiple Vitamin (MULTI VITAMIN MENS) Oral Tab, Take 1 tablet by mouth daily. •  omega-3 fatty acids 1000 MG Oral Cap, Take 1,000 mg by mouth daily. Take 3 pills daily        Review of Systems:   Pertinent items are noted in HPI.     Physica the gallbladder. No cholelithiasis, evidence of acute cholecystitis, or biliary ductal dilatation.    Dictated by (CST): Elpidio Spangler MD on 12/20/2020 at 12:45 PM     Finalized by (CST): Elpidio Spangler MD on 12/20/2020 at 12:46 PM          Xr Chest Ap Portab diabetes  Hypertension  Dyslipidemia  GERD  History of diverticulosis  History of peptic ulcer disease         Daysi Moyer MD  12/20/2020

## 2020-12-21 NOTE — PROGRESS NOTES
Discussed with Dr. Wanda Rose and Dr. Sarah BARRIENTOS +  Patient with normal WBC count  ?  Underlying liver disease from cirrhosis or fatty liver/alcoholic liver disease  Given improved pain will continue antibiotics and monitor  Would hold off cholecystosto

## 2020-12-22 ENCOUNTER — APPOINTMENT (OUTPATIENT)
Dept: INTERVENTIONAL RADIOLOGY/VASCULAR | Facility: HOSPITAL | Age: 60
DRG: 871 | End: 2020-12-22
Attending: INTERNAL MEDICINE
Payer: MEDICAID

## 2020-12-22 PROCEDURE — 99233 SBSQ HOSP IP/OBS HIGH 50: CPT | Performed by: HOSPITALIST

## 2020-12-22 PROCEDURE — 99233 SBSQ HOSP IP/OBS HIGH 50: CPT | Performed by: INTERNAL MEDICINE

## 2020-12-22 PROCEDURE — 0F9430Z DRAINAGE OF GALLBLADDER WITH DRAINAGE DEVICE, PERCUTANEOUS APPROACH: ICD-10-PCS | Performed by: RADIOLOGY

## 2020-12-22 PROCEDURE — 99232 SBSQ HOSP IP/OBS MODERATE 35: CPT | Performed by: SURGERY

## 2020-12-22 RX ORDER — POTASSIUM CHLORIDE 20 MEQ/1
40 TABLET, EXTENDED RELEASE ORAL EVERY 4 HOURS
Status: COMPLETED | OUTPATIENT
Start: 2020-12-22 | End: 2020-12-22

## 2020-12-22 RX ORDER — MIDAZOLAM HYDROCHLORIDE 1 MG/ML
INJECTION INTRAMUSCULAR; INTRAVENOUS
Status: COMPLETED
Start: 2020-12-22 | End: 2020-12-22

## 2020-12-22 RX ORDER — METHYLPREDNISOLONE SODIUM SUCCINATE 125 MG/2ML
INJECTION, POWDER, LYOPHILIZED, FOR SOLUTION INTRAMUSCULAR; INTRAVENOUS
Status: COMPLETED
Start: 2020-12-22 | End: 2020-12-22

## 2020-12-22 RX ORDER — LIDOCAINE HYDROCHLORIDE 20 MG/ML
INJECTION, SOLUTION EPIDURAL; INFILTRATION; INTRACAUDAL; PERINEURAL
Status: COMPLETED
Start: 2020-12-22 | End: 2020-12-22

## 2020-12-22 RX ORDER — DIPHENHYDRAMINE HYDROCHLORIDE 50 MG/ML
INJECTION INTRAMUSCULAR; INTRAVENOUS
Status: COMPLETED
Start: 2020-12-22 | End: 2020-12-22

## 2020-12-22 NOTE — PLAN OF CARE
Problem: PAIN - ADULT  Goal: Verbalizes/displays adequate comfort level or patient's stated pain goal  Description: INTERVENTIONS:  - Encourage pt to monitor pain and request assistance  - Assess pain using appropriate pain scale  - Administer analgesics appropriate  - Identify discharge learning needs (meds, wound care, etc)  - Arrange for interpreters to assist at discharge as needed  - Consider post-discharge preferences of patient/family/discharge partner  - Complete POLST form as appropriate  - Assess Patient/Family Long Term Goal  Description: Patient's Long Term Goal: to go home  Interventions:  - Monitor vitals  - Monitor labs  - Administer antibiotics  - IV fluids  - Follow orders per MD  - Discharge planning   - See additional Care Plan goals for s

## 2020-12-22 NOTE — PROGRESS NOTES
[facility]    Progress Note    Claudine Lezama Patient Status:  Inpatient    1960 MRN [id number]   Location [address] [place] [address] Attending Nereida Kilgore, [address] [facility] Day # 2 PCP Terry Shin MD       Subjective:   Claudine Salgado Scan  (cpt=[id number])    Result Date: 12/21/2020  CONCLUSION: Abnormal nuclear hepatobililary study. There is delayed clearance activity from the blood pool with no excretion of radiotracer from the liver parenchyma into the biliary system.   This indicates li dependence.   -Has not been drinking since 11/4        Other problems  Obesity   Possible obstructive sleep apnea  Borderline diabetes  Hypertension  Dyslipidemia  GERD  History of diverticulosis  History of peptic ulcer disease     Dispo--> pending      G

## 2020-12-22 NOTE — PAYOR COMM NOTE
--------------  CONTINUED STAY REVIEW------REQUESTING ADDITIONAL DAY 12/22      Payor: Rinku Bravo #:  [id number]  Authorization Number: [id number]      Admit date: 12/20/20  Admit time: 3936    [id number] GERD (gastroesophageal reflux disease)     Colon polyps     Acute diverticulitis     Dyslipidemia     Essential hypertension     Hepatic steatosis     Alcohol-induced acute pancreatitis     Alcohol-induced acute pancreatitis, unspecified complication st 12/21/2020 1721 Given 100 mg Oral Keila Yi RN      lactated ringers infusion     Date Action Dose Route User    12/22/2020 [address] (none) Intravenous Jessica Tan RN    12/21/2020 8427 New Bag (none) Intravenous Jessica Tan RN

## 2020-12-22 NOTE — PROGRESS NOTES
INFECTIOUS DISEASE PROGRESS NOTE  [facility] ID PROGRESS NOTE    Mortimer Bruins Teja Patient Status:  Inpatient    1960 MRN [id number]   Location [address] [place] [address] Attending Cecilia Velarde MD   Hosp Day # 2 PCP Brayan Amos 63year old male with a history of HTN, hospitalization 1/5 to 11/11 for ETOH pancreatitis, who presented to [facility] ED on 12/20 for RUQ pain. Patient states that he has not had any alcohol since last admission.  Notes some constipation and RUQ pain started on

## 2020-12-22 NOTE — PROGRESS NOTES
[place] [facility] - [facility]    Progress Note    Yvette Dorado Teja Patient Status:  Inpatient    1960 MRN [id number]   Location [address] [place] [address] Attending Rafael Corrales MD   Hosp Day # 2 PCP Pasty Cooks, MD     Assessment and Plan:     E Component Value Date    WBC 6.1 12/22/2020    HGB 14.1 12/22/2020    HCT 39.8 12/22/2020    .0 12/22/2020    CREATSERUM 0.74 12/22/2020    BUN 11 12/22/2020     12/22/2020    K 3.6 12/22/2020     12/22/2020    CO2 25.0 12/22/2020    GL is also gallbladder wall thickening. No intra or extrahepatic biliary ductal dilatation or divisum. Findings are nonspecific but raise the possibility of cholecystitis.   Given recent gallbladder ultrasound findings was essentially normal, consider hepato

## 2020-12-22 NOTE — PLAN OF CARE
Pt alert and oriented x4; independent. On clear liquid diet. Meropenum given. LR infusing. Denies abdominal pain. Vital signs currently stable; will continue to monitor.      Problem: PAIN - ADULT  Goal: Verbalizes/displays adequate comfort level or patien for interpreters to assist at discharge as needed  - Consider post-discharge preferences of patient/family/discharge partner  - Complete POLST form as appropriate  - Assess patient's ability to be responsible for managing their own health  - Refer to Case Term Goal: to go home  Interventions:  - Monitor vitals  - Monitor labs  - Administer antibiotics  - IV fluids  - Follow orders per MD  - Discharge planning   - See additional Care Plan goals for specific interventions  Outcome: Progressing  Goal: Patient/

## 2020-12-22 NOTE — TELEPHONE ENCOUNTER
Noted. Cancelled CT Scan order from [facility] per below message from Dr. Pat Arenas. Patient still inpatient at this time-will notify patient so he knows not to complete once discharged.

## 2020-12-23 PROCEDURE — 99232 SBSQ HOSP IP/OBS MODERATE 35: CPT | Performed by: SURGERY

## 2020-12-23 PROCEDURE — 99233 SBSQ HOSP IP/OBS HIGH 50: CPT | Performed by: HOSPITALIST

## 2020-12-23 PROCEDURE — 99232 SBSQ HOSP IP/OBS MODERATE 35: CPT | Performed by: INTERNAL MEDICINE

## 2020-12-23 NOTE — PROGRESS NOTES
[facility]    Progress Note    [address] Patient Status:  Inpatient    1960 MRN [id number]   Location [facility] 3W/SW Attending Butch Thompson MD   Hosp Day # 3 PCP Leigh Mancini MD     Assessment and Plan: (L) 12/22/2020    ALB 3.0 (L) 12/22/2020    ALKPHO 142 (H) 12/22/2020    BILT 6.6 (H) 12/22/2020    TP 6.9 12/22/2020     (H) 12/22/2020     (H) 12/22/2020    INR 1.01 12/10/2020    TSH 2.03 01/24/2014     12/20/2020    PSA 0.7 10/31/2

## 2020-12-23 NOTE — PROGRESS NOTES
INFECTIOUS DISEASE PROGRESS NOTE  [facility] ID PROGRESS NOTE    Nathalia Lezama Patient Status:  Inpatient    1960 MRN [id number]   Location [address] [place] [address] Attending Abelardo Boles MD   Hosp Day # 3 PCP Manjinder Cornelius 63year old male with a history of HTN, hospitalization 1/5 to 11/11 for ETOH pancreatitis, who presented to [address] [place] [address] ED on 12/20 for RUQ pain. Patient states that he has not had any alcohol since last admission.  Notes some constipation and RUQ pain started on

## 2020-12-23 NOTE — PAYOR COMM NOTE
--------------  CONTINUED STAY REVIEW-----REQUESTING ADDITIONAL DAY 12/23      Payor: Rinku Bravo #:  [id number]  Authorization Number: [id number]      Admit date: 12/20/20  Admit time: 3215 [facility] Duodenal ulcer with hemorrhage     GERD (gastroesophageal reflux disease)     Colon polyps     Acute diverticulitis     Dyslipidemia     Essential hypertension     Hepatic steatosis     Alcohol-induced acute pancreatitis     Alcohol-induced acute pancre Date Action Dose Route User    12/23/2020 0700 Rate/Dose Verify (none) Intravenous Makenzie Laboy RN    12/22/2020 1626 New Bag (none) Intravenous Akhil Esquivel RN      Meropenem (MERREM) 500 mg in sodium chloride 0.9% 100 mL MBP     Date Action Dose R

## 2020-12-23 NOTE — PROGRESS NOTES
[facility]  GI SERVICE PROGRESS NOTE    Nalini Lezama Patient Status:  Inpatient    1960 MRN [id number]   Location [facility] 3W/SW Attending Ciarra Russell MD   Hosp Day # 2 PCP Leigh Mancini MD       Subjective: BILT 6.5* 6.8* 6.6*  --    TP 9.0* 6.3* 6.9  --        Recent Labs   Lab 12/20/20  1139          Recent Labs   Lab 12/20/20  1140 12/21/20  0524 12/22/20  0528   MG 2.0 1.8 2.2       Recent Labs   Lab 12/20/20  1306   BLDSMR Gram Negative Rods* recently arranged by Dr. Meredith Sorensen.         Terrell Stack MD

## 2020-12-23 NOTE — BRIEF PROCEDURE NOTE
[facility]  Procedure Note    Marilu Lezama Patient Status:  Inpatient    1960 MRN [id number]   Location [facility] 3W/SW Attending Silvia Dueñas MD   Hosp Day # 2 PCP Orlando Rodriguez MD     Procedure: Ronnie Del Cid

## 2020-12-24 VITALS
TEMPERATURE: 98 F | WEIGHT: 235 LBS | HEIGHT: 72 IN | RESPIRATION RATE: 18 BRPM | SYSTOLIC BLOOD PRESSURE: 140 MMHG | HEART RATE: 59 BPM | OXYGEN SATURATION: 97 % | DIASTOLIC BLOOD PRESSURE: 91 MMHG | BODY MASS INDEX: 31.83 KG/M2

## 2020-12-24 PROCEDURE — 99232 SBSQ HOSP IP/OBS MODERATE 35: CPT | Performed by: SURGERY

## 2020-12-24 PROCEDURE — 99239 HOSP IP/OBS DSCHRG MGMT >30: CPT | Performed by: HOSPITALIST

## 2020-12-24 RX ORDER — CIPROFLOXACIN 500 MG/1
500 TABLET, FILM COATED ORAL 2 TIMES DAILY
Qty: 20 TABLET | Refills: 0 | Status: SHIPPED | OUTPATIENT
Start: 2020-12-24 | End: 2021-01-03

## 2020-12-24 RX ORDER — METRONIDAZOLE 500 MG/1
500 TABLET ORAL 3 TIMES DAILY
Qty: 30 TABLET | Refills: 0 | Status: SHIPPED | OUTPATIENT
Start: 2020-12-24 | End: 2021-01-03

## 2020-12-24 NOTE — PROGRESS NOTES
[facility]    Progress Note    Claudine Lezama Patient Status:  Inpatient    1960 MRN [id number]   Location [address] [place] [address] Attending Nereida Kilgore, [address] [facility] Day # 3 PCP Terry Shin MD       Subjective:   Claudine Salgado Finalized by (CST): Trace Cardenas MD on 12/23/2020 at 7:17 PM                  Assessment and Plan:      Fever/elevated transaminases/Bacteremia  Likely related to acute cholecystitis  MRCP reviewed  Initially started on Cipro and Flagyl - changed to lesvia

## 2020-12-24 NOTE — PROGRESS NOTES
[facility]  GI SERVICE PROGRESS NOTE    Julieta Lezama Patient Status:  Inpatient    1960 MRN [id number]   Location [facility] 3W/SW Attending Danielito Moralez MD   Hosp Day # 3 PCP Renny Sutherland MD       Subjective: TP 9.0* 6.3* 6.9  --        Recent Labs   Lab 12/20/20  1139          Recent Labs   Lab 12/20/20  1140 12/21/20  0524 12/22/20  0528   MG 2.0 1.8 2.2       Recent Labs   Lab 12/20/20  1306   BLDSMR Gram Negative Rods*         Ir Cholecystostomy

## 2020-12-24 NOTE — DISCHARGE SUMMARY
[facility]    Discharge Summary    [address] Patient Status:  Inpatient    1960 MRN [id number]   Location [facility] 3W/SW Attending Carolyne Lindo MD   Hosp Day # 4 PCP Emiliana Dinero MD     Date of Admissio which came on after eating dinner which was tacos. He notes that he had the pain which resolved and then noted that he had been having fevers of up to 102 at home. He denies chest pain shortness of breath cough dysuria.   Notes that he has not been drinki CIPRO      Take 1 tablet (500 mg total) by mouth 2 (two) times daily for 10 days.    Stop taking on: January 3, 2021  Quantity: 20 tablet  Refills: 0     metRONIDAZOLE 500 MG Tabs  Commonly known as: Flagyl      Take 1 tablet (500 mg total) by mouth 3 (thre

## 2020-12-24 NOTE — PROGRESS NOTES
INFECTIOUS DISEASE PROGRESS NOTE  [facility] ID [facility]GRESS [facility]    Shoshana Roscoemelissa Lezama Patient Status:  Inpatient    1960 MRN [id number]   Location [address] [place] [address] Attending Vy Starks MD   Hosp Day # 4 GLADIS Cortés 63year old male with a history of HTN, hospitalization 1/5 to 11/11 for ETOH pancreatitis, who presented to [address] [place] [address] ED on 12/20 for RUQ pain. Patient states that he has not had any alcohol since last admission.  Notes some constipation and RUQ pain started on

## 2020-12-24 NOTE — PROGRESS NOTES
[place] [facility] - [facility]    Progress Note    [address] Patient Status:  Inpatient    1960 MRN [id number]   Location [facility] 3W/SW Attending Shameka Salinas MD   [facility] Day # 4 PCP Keiko Cade MD     Assessment and Plan: ALKPHO 142 (H) 12/22/2020    BILT 6.6 (H) 12/22/2020    TP 6.9 12/22/2020     (H) 12/22/2020     (H) 12/22/2020    INR 1.01 12/10/2020    TSH 2.03 01/24/2014     12/20/2020    PSA 0.7 10/31/2017    ESRML 7 08/15/2018    CRP 9.3 (H) 08/

## 2020-12-24 NOTE — CM/SW NOTE
07: 55AM  Received MDO for [facility] RN. F2F entered. ASTRID sent [facility] referrals via Aidin for review. 12:20PM  Per chart review, pt has d/c order for today 12/24.     ASTRID spoke [facility] at: [phone] and confirmed they can accept pt but cannot staff until M

## 2020-12-24 NOTE — PLAN OF CARE
No complaints overnight. R biliary tube irrigated and in place. Con't IV meropenem. Ambulating to bathroom. Discharge home once medically cleared. Outpatient cholecystectomy in 2 weeks. SW consulted for [facility] services.      Problem: PAIN - ADULT  Goal: Verbal resources  Description: INTERVENTIONS:  - Identify barriers to discharge w/pt and caregiver  - Include patient/family/discharge partner in discharge planning  - Arrange for needed discharge resources and transportation as appropriate  - Identify discharge needed  - Assess and instruct to report SOB or any respiratory difficulty  - Respiratory Therapy support as indicated  - Manage/alleviate anxiety  - Monitor for signs/symptoms of CO2 retention  Outcome: Progressing     Problem: Patient/Family Goals  Goal: appropriate  Outcome: Progressing

## 2020-12-24 NOTE — PLAN OF CARE
Ok to discharge patient home. Discharge instructions explained to patient. Patient was able to demonstrate flushing the cholecystostomy tube and emptying the bag. Extra supplies provided. Home health to follow up on Monday.  Patient sent home with all [facility]

## 2020-12-28 ENCOUNTER — PATIENT OUTREACH (OUTPATIENT)
Dept: CASE MANAGEMENT | Age: 60
End: 2020-12-28

## 2020-12-28 NOTE — PAYOR COMM NOTE
--------------  DISCHARGE REVIEW    Payor: Rinku Bravo #:  [id number]  Authorization Number: [id number]     Admit date: 12/20/20  Admit time:  9072  Discharge Date: 12/24/2020  2:34 PM     Admitting Ph unspecified fever cause     Hepatitis        Physical Exam:     Gen: No acute distress, alert and oriented x3  Pulm: Lungs clear, normal respiratory effort  CV: Heart with regular rate and rhythm  Abd: Abdomen soft, nontender, nondistended, bowel sounds pr this was thought to be secondary to acute pancreatitis at that time  Now with E Coli bacteremia  HIDA scan -->abnormal, indicating liver dysfuntion  S/p cholecystostomy tube to decompress gallbladder by IR  ID on consult - home on oral cipro and flagyl  Ne Refills: 0           Where to Get Your Medications      Please  your prescriptions at the location directed by your doctor or nurse    Bring a paper prescription for each of these medications  · Ciprofloxacin HCl 500 MG Tabs  · metRONIDAZOLE 500 M

## 2020-12-29 NOTE — CM/SW NOTE
SW received update from [facility]. They reached out to pt to initiate services and pt declined.     Claudene Ground, [address] [facility]

## 2021-01-05 ENCOUNTER — OFFICE VISIT (OUTPATIENT)
Dept: SURGERY | Facility: CLINIC | Age: 61
End: 2021-01-05
Payer: MEDICAID

## 2021-01-05 ENCOUNTER — LABORATORY ENCOUNTER (OUTPATIENT)
Dept: LAB | Age: 61
End: 2021-01-05
Attending: SURGERY
Payer: MEDICAID

## 2021-01-05 ENCOUNTER — TELEPHONE (OUTPATIENT)
Dept: SURGERY | Facility: CLINIC | Age: 61
End: 2021-01-05

## 2021-01-05 VITALS — WEIGHT: 235 LBS | BODY MASS INDEX: 31.83 KG/M2 | HEIGHT: 72 IN

## 2021-01-05 DIAGNOSIS — K80.00 CALCULUS OF GALLBLADDER WITH ACUTE CHOLECYSTITIS WITHOUT OBSTRUCTION: Primary | ICD-10-CM

## 2021-01-05 DIAGNOSIS — R79.89 ABNORMAL LIVER FUNCTION TESTS: ICD-10-CM

## 2021-01-05 DIAGNOSIS — K80.00 CALCULUS OF GALLBLADDER WITH ACUTE CHOLECYSTITIS WITHOUT OBSTRUCTION: ICD-10-CM

## 2021-01-05 LAB
ALBUMIN SERPL-MCNC: 4.3 G/DL (ref 3.4–5)
ALBUMIN/GLOB SERPL: 1.1 {RATIO} (ref 1–2)
ALP LIVER SERPL-CCNC: 133 U/L
ALT SERPL-CCNC: 114 U/L
ANION GAP SERPL CALC-SCNC: 7 MMOL/L (ref 0–18)
AST SERPL-CCNC: 53 U/L (ref 15–37)
BASOPHILS # BLD AUTO: 0.04 X10(3) UL (ref 0–0.2)
BASOPHILS NFR BLD AUTO: 0.8 %
BILIRUB DIRECT SERPL-MCNC: 0.8 MG/DL (ref 0–0.2)
BILIRUB SERPL-MCNC: 1.3 MG/DL (ref 0.1–2)
BUN BLD-MCNC: 12 MG/DL (ref 7–18)
BUN/CREAT SERPL: 14.3 (ref 10–20)
CALCIUM BLD-MCNC: 9.5 MG/DL (ref 8.5–10.1)
CHLORIDE SERPL-SCNC: 100 MMOL/L (ref 98–112)
CO2 SERPL-SCNC: 25 MMOL/L (ref 21–32)
CREAT BLD-MCNC: 0.84 MG/DL
DEPRECATED RDW RBC AUTO: 38.2 FL (ref 35.1–46.3)
EOSINOPHIL # BLD AUTO: 0.11 X10(3) UL (ref 0–0.7)
EOSINOPHIL NFR BLD AUTO: 2.1 %
ERYTHROCYTE [DISTWIDTH] IN BLOOD BY AUTOMATED COUNT: 11.6 % (ref 11–15)
GLOBULIN PLAS-MCNC: 4 G/DL (ref 2.8–4.4)
GLUCOSE BLD-MCNC: 111 MG/DL (ref 70–99)
HCT VFR BLD AUTO: 45.1 %
HGB BLD-MCNC: 15.9 G/DL
IMM GRANULOCYTES # BLD AUTO: 0.02 X10(3) UL (ref 0–1)
IMM GRANULOCYTES NFR BLD: 0.4 %
INR BLD: 1.07 (ref 0.9–1.2)
LYMPHOCYTES # BLD AUTO: 1.76 X10(3) UL (ref 1–4)
LYMPHOCYTES NFR BLD AUTO: 34 %
M PROTEIN MFR SERPL ELPH: 8.3 G/DL (ref 6.4–8.2)
MCH RBC QN AUTO: 31.9 PG (ref 26–34)
MCHC RBC AUTO-ENTMCNC: 35.3 G/DL (ref 31–37)
MCV RBC AUTO: 90.6 FL
MONOCYTES # BLD AUTO: 0.6 X10(3) UL (ref 0.1–1)
MONOCYTES NFR BLD AUTO: 11.6 %
NEUTROPHILS # BLD AUTO: 2.65 X10 (3) UL (ref 1.5–7.7)
NEUTROPHILS # BLD AUTO: 2.65 X10(3) UL (ref 1.5–7.7)
NEUTROPHILS NFR BLD AUTO: 51.1 %
OSMOLALITY SERPL CALC.SUM OF ELEC: 274 MOSM/KG (ref 275–295)
PATIENT FASTING Y/N/NP: NO
PLATELET # BLD AUTO: 318 10(3)UL (ref 150–450)
POTASSIUM SERPL-SCNC: 4.8 MMOL/L (ref 3.5–5.1)
PROTHROMBIN TIME: 13.7 SECONDS (ref 11.8–14.5)
RBC # BLD AUTO: 4.98 X10(6)UL
SODIUM SERPL-SCNC: 132 MMOL/L (ref 136–145)
WBC # BLD AUTO: 5.2 X10(3) UL (ref 4–11)

## 2021-01-05 PROCEDURE — 85025 COMPLETE CBC W/AUTO DIFF WBC: CPT

## 2021-01-05 PROCEDURE — 36415 COLL VENOUS BLD VENIPUNCTURE: CPT

## 2021-01-05 PROCEDURE — 80053 COMPREHEN METABOLIC PANEL: CPT

## 2021-01-05 PROCEDURE — 82248 BILIRUBIN DIRECT: CPT

## 2021-01-05 PROCEDURE — 3008F BODY MASS INDEX DOCD: CPT | Performed by: SURGERY

## 2021-01-05 PROCEDURE — 99213 OFFICE O/P EST LOW 20 MIN: CPT | Performed by: SURGERY

## 2021-01-05 PROCEDURE — 85610 PROTHROMBIN TIME: CPT

## 2021-01-05 RX ORDER — 0.9 % SODIUM CHLORIDE 0.9 %
5 VIAL (ML) INJECTION EVERY 8 HOURS
Qty: 14 SYRINGE | Refills: 0 | OUTPATIENT
Start: 2021-01-05 | End: 2021-01-08

## 2021-01-05 RX ORDER — 0.9 % SODIUM CHLORIDE 0.9 %
5 VIAL (ML) INJECTION DAILY
Qty: 14 SYRINGE | Refills: 0 | OUTPATIENT
Start: 2021-01-05 | End: 2021-01-08

## 2021-01-05 RX ORDER — 0.9 % SODIUM CHLORIDE 0.9 %
5 VIAL (ML) INJECTION DAILY
Qty: 14 SYRINGE | Refills: 0 | OUTPATIENT
Start: 2021-01-05 | End: 2021-01-05

## 2021-01-05 NOTE — H&P
History and Physical      HPI   Cholelithiasis, sepsis secondary to cholangitis    HPI  Sasha Bajwa is a 63year old male who presents with a cholecystostomy tube, E. coli sepsis, transaminitis and hepatocellular disease.   He has been doing wel mouth daily. If blood pressure remains above 140, start taking whole tablet 90 tablet 1   • Multiple Vitamin (MULTI VITAMIN MENS) Oral Tab Take 1 tablet by mouth daily. • omega-3 fatty acids 1000 MG Oral Cap Take 1,000 mg by mouth daily.  Take 3 pills d auscultation bilaterally normal respiratory effort  Cardiovascular: regular rate and rhythm no murmurs, gallups, or rubs  Abdomen: soft non-tender non-distended no organomegaly noted no masses  Cholecystostomy tube in place with clear bile  Extremities: no

## 2021-01-05 NOTE — PATIENT INSTRUCTIONS
Stop fish oil for now. Plan laparoscopic cholecystectomy at [address] lab testing in the next few days    Same day surgery will call with instructions.

## 2021-01-07 PROBLEM — K92.2 GASTROINTESTINAL HEMORRHAGE: Status: ACTIVE | Noted: 2019-01-01

## 2021-01-07 PROBLEM — K81.0 ACUTE CHOLECYSTITIS: Status: ACTIVE | Noted: 2020-12-01

## 2021-01-08 ENCOUNTER — OFFICE VISIT (OUTPATIENT)
Dept: INTERNAL MEDICINE CLINIC | Facility: CLINIC | Age: 61
End: 2021-01-08
Payer: MEDICAID

## 2021-01-08 VITALS
HEIGHT: 72 IN | BODY MASS INDEX: 31.29 KG/M2 | SYSTOLIC BLOOD PRESSURE: 134 MMHG | WEIGHT: 231 LBS | DIASTOLIC BLOOD PRESSURE: 76 MMHG | HEART RATE: 93 BPM

## 2021-01-08 DIAGNOSIS — K81.0 ACUTE CHOLECYSTITIS: ICD-10-CM

## 2021-01-08 DIAGNOSIS — I10 ESSENTIAL HYPERTENSION: ICD-10-CM

## 2021-01-08 DIAGNOSIS — Z01.818 PREOPERATIVE EXAMINATION: Primary | ICD-10-CM

## 2021-01-08 PROCEDURE — 99214 OFFICE O/P EST MOD 30 MIN: CPT | Performed by: INTERNAL MEDICINE

## 2021-01-08 PROCEDURE — 3075F SYST BP GE 130 - 139MM HG: CPT | Performed by: INTERNAL MEDICINE

## 2021-01-08 PROCEDURE — 3008F BODY MASS INDEX DOCD: CPT | Performed by: INTERNAL MEDICINE

## 2021-01-08 PROCEDURE — 3078F DIAST BP <80 MM HG: CPT | Performed by: INTERNAL MEDICINE

## 2021-01-08 NOTE — H&P
Gamaliel La is a 63year old male who presents this afternoon, at Dr. Bentley Space request, for preoperative medical evaluation. He is scheduled for laparoscopic cholecystectomy on January 14.   HPI:   He was hospitalized on December 20 with acu daily. If blood pressure remains above 140, start taking whole tablet 90 tablet 1   • Multiple Vitamin (MULTI VITAMIN MENS) Oral Tab Take 1 tablet by mouth daily. • omega-3 fatty acids 1000 MG Oral Cap Take 1,000 mg by mouth daily.  Take 3 pills daily Smoking status: Never Smoker      Smokeless tobacco: Never Used    Alcohol use: Not Currently      Alcohol/week: 8.0 standard drinks      Types: 4 Cans of beer, 4 Shots of liquor per week      Frequency: 4 or more times a week      Drinks per session: 7 to Now asymptomatic. Laparoscopic cholecystectomy as above    3.  Essential hypertension  Well-controlled on lisinopril/HCTZ      Alvarado Elkins MD  1/8/2021  1:10 PM

## 2021-01-08 NOTE — PATIENT INSTRUCTIONS
You are medically stable for your scheduled gallbladder surgery. Your blood pressure is very well controlled on current medication which should continue. Return visit in 6 months.

## 2021-01-11 ENCOUNTER — LAB ENCOUNTER (OUTPATIENT)
Dept: LAB | Facility: HOSPITAL | Age: 61
End: 2021-01-11
Attending: SURGERY
Payer: MEDICAID

## 2021-01-11 DIAGNOSIS — Z01.818 PREOP TESTING: ICD-10-CM

## 2021-01-12 LAB — SARS-COV-2 RNA RESP QL NAA+PROBE: NOT DETECTED

## 2021-01-14 ENCOUNTER — ANESTHESIA EVENT (OUTPATIENT)
Dept: SURGERY | Facility: HOSPITAL | Age: 61
End: 2021-01-14
Payer: MEDICAID

## 2021-01-14 ENCOUNTER — HOSPITAL ENCOUNTER (OUTPATIENT)
Facility: HOSPITAL | Age: 61
Setting detail: HOSPITAL OUTPATIENT SURGERY
Discharge: HOME | End: 2021-01-14
Attending: SURGERY | Admitting: SURGERY
Payer: MEDICAID

## 2021-01-14 ENCOUNTER — ANESTHESIA (OUTPATIENT)
Dept: SURGERY | Facility: HOSPITAL | Age: 61
End: 2021-01-14
Payer: MEDICAID

## 2021-01-14 VITALS
OXYGEN SATURATION: 94 % | RESPIRATION RATE: 14 BRPM | WEIGHT: 228 LBS | TEMPERATURE: 98 F | HEART RATE: 67 BPM | BODY MASS INDEX: 30.88 KG/M2 | DIASTOLIC BLOOD PRESSURE: 89 MMHG | SYSTOLIC BLOOD PRESSURE: 136 MMHG | HEIGHT: 72 IN

## 2021-01-14 DIAGNOSIS — K80.00 CALCULUS OF GALLBLADDER WITH ACUTE CHOLECYSTITIS WITHOUT OBSTRUCTION: ICD-10-CM

## 2021-01-14 DIAGNOSIS — Z01.818 PREOP TESTING: Primary | ICD-10-CM

## 2021-01-14 PROCEDURE — 0DNU4ZZ RELEASE OMENTUM, PERCUTANEOUS ENDOSCOPIC APPROACH: ICD-10-PCS | Performed by: SURGERY

## 2021-01-14 PROCEDURE — 0DNE4ZZ RELEASE LARGE INTESTINE, PERCUTANEOUS ENDOSCOPIC APPROACH: ICD-10-PCS | Performed by: SURGERY

## 2021-01-14 PROCEDURE — 0FB04ZX EXCISION OF LIVER, PERCUTANEOUS ENDOSCOPIC APPROACH, DIAGNOSTIC: ICD-10-PCS | Performed by: SURGERY

## 2021-01-14 PROCEDURE — 47562 LAPAROSCOPIC CHOLECYSTECTOMY: CPT | Performed by: SURGERY

## 2021-01-14 PROCEDURE — 0DN84ZZ RELEASE SMALL INTESTINE, PERCUTANEOUS ENDOSCOPIC APPROACH: ICD-10-PCS | Performed by: SURGERY

## 2021-01-14 PROCEDURE — 47001 NDL BIOPSY LVR TM OTH MAJ PX: CPT | Performed by: SURGERY

## 2021-01-14 PROCEDURE — 0FT44ZZ RESECTION OF GALLBLADDER, PERCUTANEOUS ENDOSCOPIC APPROACH: ICD-10-PCS | Performed by: SURGERY

## 2021-01-14 RX ORDER — METOCLOPRAMIDE 10 MG/1
10 TABLET ORAL ONCE
Status: COMPLETED | OUTPATIENT
Start: 2021-01-14 | End: 2021-01-14

## 2021-01-14 RX ORDER — ACETAMINOPHEN 500 MG
1000 TABLET ORAL ONCE
Status: DISCONTINUED | OUTPATIENT
Start: 2021-01-14 | End: 2021-01-14 | Stop reason: HOSPADM

## 2021-01-14 RX ORDER — IBUPROFEN 600 MG/1
600 TABLET ORAL EVERY 6 HOURS PRN
Qty: 15 TABLET | Refills: 1 | Status: SHIPPED | OUTPATIENT
Start: 2021-01-14 | End: 2021-01-21

## 2021-01-14 RX ORDER — LABETALOL HYDROCHLORIDE 5 MG/ML
INJECTION, SOLUTION INTRAVENOUS AS NEEDED
Status: DISCONTINUED | OUTPATIENT
Start: 2021-01-14 | End: 2021-01-14 | Stop reason: SURG

## 2021-01-14 RX ORDER — HYDROMORPHONE HYDROCHLORIDE 1 MG/ML
0.6 INJECTION, SOLUTION INTRAMUSCULAR; INTRAVENOUS; SUBCUTANEOUS EVERY 5 MIN PRN
Status: DISCONTINUED | OUTPATIENT
Start: 2021-01-14 | End: 2021-01-14

## 2021-01-14 RX ORDER — HYDROMORPHONE HYDROCHLORIDE 1 MG/ML
0.2 INJECTION, SOLUTION INTRAMUSCULAR; INTRAVENOUS; SUBCUTANEOUS EVERY 5 MIN PRN
Status: DISCONTINUED | OUTPATIENT
Start: 2021-01-14 | End: 2021-01-14

## 2021-01-14 RX ORDER — HYDROCODONE BITARTRATE AND ACETAMINOPHEN 5; 325 MG/1; MG/1
1 TABLET ORAL AS NEEDED
Status: COMPLETED | OUTPATIENT
Start: 2021-01-14 | End: 2021-01-14

## 2021-01-14 RX ORDER — PROCHLORPERAZINE EDISYLATE 5 MG/ML
5 INJECTION INTRAMUSCULAR; INTRAVENOUS ONCE AS NEEDED
Status: DISCONTINUED | OUTPATIENT
Start: 2021-01-14 | End: 2021-01-14

## 2021-01-14 RX ORDER — SODIUM CHLORIDE, SODIUM LACTATE, POTASSIUM CHLORIDE, CALCIUM CHLORIDE 600; 310; 30; 20 MG/100ML; MG/100ML; MG/100ML; MG/100ML
INJECTION, SOLUTION INTRAVENOUS CONTINUOUS
Status: DISCONTINUED | OUTPATIENT
Start: 2021-01-14 | End: 2021-01-14

## 2021-01-14 RX ORDER — ROCURONIUM BROMIDE 10 MG/ML
INJECTION, SOLUTION INTRAVENOUS AS NEEDED
Status: DISCONTINUED | OUTPATIENT
Start: 2021-01-14 | End: 2021-01-14 | Stop reason: SURG

## 2021-01-14 RX ORDER — HYDROCODONE BITARTRATE AND ACETAMINOPHEN 5; 325 MG/1; MG/1
1 TABLET ORAL EVERY 6 HOURS PRN
Qty: 20 TABLET | Refills: 0 | Status: SHIPPED | OUTPATIENT
Start: 2021-01-14 | End: 2021-10-20

## 2021-01-14 RX ORDER — MORPHINE SULFATE 4 MG/ML
4 INJECTION, SOLUTION INTRAMUSCULAR; INTRAVENOUS EVERY 10 MIN PRN
Status: DISCONTINUED | OUTPATIENT
Start: 2021-01-14 | End: 2021-01-14

## 2021-01-14 RX ORDER — GLYCOPYRROLATE 0.2 MG/ML
INJECTION, SOLUTION INTRAMUSCULAR; INTRAVENOUS AS NEEDED
Status: DISCONTINUED | OUTPATIENT
Start: 2021-01-14 | End: 2021-01-14 | Stop reason: SURG

## 2021-01-14 RX ORDER — HYDROMORPHONE HYDROCHLORIDE 1 MG/ML
0.4 INJECTION, SOLUTION INTRAMUSCULAR; INTRAVENOUS; SUBCUTANEOUS EVERY 5 MIN PRN
Status: DISCONTINUED | OUTPATIENT
Start: 2021-01-14 | End: 2021-01-14

## 2021-01-14 RX ORDER — MORPHINE SULFATE 10 MG/ML
6 INJECTION, SOLUTION INTRAMUSCULAR; INTRAVENOUS EVERY 10 MIN PRN
Status: DISCONTINUED | OUTPATIENT
Start: 2021-01-14 | End: 2021-01-14

## 2021-01-14 RX ORDER — LIDOCAINE HYDROCHLORIDE 10 MG/ML
INJECTION, SOLUTION EPIDURAL; INFILTRATION; INTRACAUDAL; PERINEURAL AS NEEDED
Status: DISCONTINUED | OUTPATIENT
Start: 2021-01-14 | End: 2021-01-14 | Stop reason: SURG

## 2021-01-14 RX ORDER — MIDAZOLAM HYDROCHLORIDE 1 MG/ML
INJECTION INTRAMUSCULAR; INTRAVENOUS AS NEEDED
Status: DISCONTINUED | OUTPATIENT
Start: 2021-01-14 | End: 2021-01-14 | Stop reason: SURG

## 2021-01-14 RX ORDER — CLINDAMYCIN PHOSPHATE 150 MG/ML
INJECTION, SOLUTION INTRAVENOUS AS NEEDED
Status: DISCONTINUED | OUTPATIENT
Start: 2021-01-14 | End: 2021-01-14 | Stop reason: SURG

## 2021-01-14 RX ORDER — HYDROCODONE BITARTRATE AND ACETAMINOPHEN 5; 325 MG/1; MG/1
2 TABLET ORAL AS NEEDED
Status: COMPLETED | OUTPATIENT
Start: 2021-01-14 | End: 2021-01-14

## 2021-01-14 RX ORDER — CLINDAMYCIN PHOSPHATE 900 MG/50ML
900 INJECTION INTRAVENOUS ONCE
Status: DISCONTINUED | OUTPATIENT
Start: 2021-01-14 | End: 2021-01-14 | Stop reason: HOSPADM

## 2021-01-14 RX ORDER — MORPHINE SULFATE 4 MG/ML
2 INJECTION, SOLUTION INTRAMUSCULAR; INTRAVENOUS EVERY 10 MIN PRN
Status: DISCONTINUED | OUTPATIENT
Start: 2021-01-14 | End: 2021-01-14

## 2021-01-14 RX ORDER — BUPIVACAINE HYDROCHLORIDE 2.5 MG/ML
INJECTION, SOLUTION EPIDURAL; INFILTRATION; INTRACAUDAL AS NEEDED
Status: DISCONTINUED | OUTPATIENT
Start: 2021-01-14 | End: 2021-01-14 | Stop reason: HOSPADM

## 2021-01-14 RX ORDER — FAMOTIDINE 20 MG/1
20 TABLET ORAL ONCE
Status: COMPLETED | OUTPATIENT
Start: 2021-01-14 | End: 2021-01-14

## 2021-01-14 RX ORDER — ONDANSETRON 2 MG/ML
4 INJECTION INTRAMUSCULAR; INTRAVENOUS ONCE AS NEEDED
Status: DISCONTINUED | OUTPATIENT
Start: 2021-01-14 | End: 2021-01-14

## 2021-01-14 RX ORDER — ONDANSETRON 2 MG/ML
INJECTION INTRAMUSCULAR; INTRAVENOUS AS NEEDED
Status: DISCONTINUED | OUTPATIENT
Start: 2021-01-14 | End: 2021-01-14 | Stop reason: SURG

## 2021-01-14 RX ORDER — DEXAMETHASONE SODIUM PHOSPHATE 4 MG/ML
VIAL (ML) INJECTION AS NEEDED
Status: DISCONTINUED | OUTPATIENT
Start: 2021-01-14 | End: 2021-01-14 | Stop reason: SURG

## 2021-01-14 RX ORDER — NALOXONE HYDROCHLORIDE 0.4 MG/ML
80 INJECTION, SOLUTION INTRAMUSCULAR; INTRAVENOUS; SUBCUTANEOUS AS NEEDED
Status: DISCONTINUED | OUTPATIENT
Start: 2021-01-14 | End: 2021-01-14

## 2021-01-14 RX ADMIN — ROCURONIUM BROMIDE 40 MG: 10 INJECTION, SOLUTION INTRAVENOUS at 09:16:00

## 2021-01-14 RX ADMIN — CLINDAMYCIN PHOSPHATE 900 MG: 150 INJECTION, SOLUTION INTRAVENOUS at 09:20:00

## 2021-01-14 RX ADMIN — DEXAMETHASONE SODIUM PHOSPHATE 4 MG: 4 MG/ML VIAL (ML) INJECTION at 09:16:00

## 2021-01-14 RX ADMIN — MIDAZOLAM HYDROCHLORIDE 2 MG: 1 INJECTION INTRAMUSCULAR; INTRAVENOUS at 09:12:00

## 2021-01-14 RX ADMIN — SODIUM CHLORIDE, SODIUM LACTATE, POTASSIUM CHLORIDE, CALCIUM CHLORIDE: 600; 310; 30; 20 INJECTION, SOLUTION INTRAVENOUS at 09:51:00

## 2021-01-14 RX ADMIN — SODIUM CHLORIDE, SODIUM LACTATE, POTASSIUM CHLORIDE, CALCIUM CHLORIDE: 600; 310; 30; 20 INJECTION, SOLUTION INTRAVENOUS at 09:12:00

## 2021-01-14 RX ADMIN — ONDANSETRON 4 MG: 2 INJECTION INTRAMUSCULAR; INTRAVENOUS at 09:16:00

## 2021-01-14 RX ADMIN — LABETALOL HYDROCHLORIDE 5 MG: 5 INJECTION, SOLUTION INTRAVENOUS at 09:49:00

## 2021-01-14 RX ADMIN — LIDOCAINE HYDROCHLORIDE 50 MG: 10 INJECTION, SOLUTION EPIDURAL; INFILTRATION; INTRACAUDAL; PERINEURAL at 09:16:00

## 2021-01-14 RX ADMIN — GLYCOPYRROLATE 0.2 MG: 0.2 INJECTION, SOLUTION INTRAMUSCULAR; INTRAVENOUS at 09:16:00

## 2021-01-14 NOTE — ANESTHESIA PREPROCEDURE EVALUATION
Anesthesia PreOp Note    HPI:     Rayna Brewer is a 63year old male who presents for preoperative consultation requested by: Mauricio Lindquist MD    Date of Surgery: 1/14/2021    Procedure(s):  LAPAROSCOPIC CHOLECYSTECTOMY  Indication: Calculu negative   • Dyslipidemia    • Essential hypertension    • Gastrointestinal hemorrhage 01/2019    Melena; EGD with small hiatal hernia, gastric polyps, mild gastritis, biopsies benign, H. pylori negative; colonoscopy with diverticulosis and internal hemorr Jake Moreno MD    No current [facility]-ordered outpatient medications on file.         Amoxicillin             HIVES  Radiology Contrast *    QUENTIN RIVERA    Family History   Problem Relation Age of Onset   • Diabetes Father    • Hypertension Father    • He Fear of current or ex partner: Not on file        Emotionally abused: Not on file        Physically abused: Not on file        Forced sexual activity: Not on file    Other Topics      Concerns:         Service: Not Asked        Blood Transfusions: exam     Pulmonary - negative ROS and normal exam   Cardiovascular - negative ROS and normal exam  (+) hypertension,     Neuro/Psych - negative ROS     GI/Hepatic/Renal - negative ROS   (+) GERD, liver disease,     Endo/Other - negative ROS   Abdominal  -

## 2021-01-14 NOTE — ANESTHESIA POSTPROCEDURE EVALUATION
Patient: Michael Lezama    Procedure Summary     Date: 01/14/21 Room / Location: [facility] OR 02 / [facility] OR    Anesthesia Start: 0463 Anesthesia Stop:     Procedure: LAPAROSCOPIC CHOLECYSTECTOMY (N/A ) Diagnosis:       Calculus of gallbladde

## 2021-01-14 NOTE — INTERVAL H&P NOTE
Pre-op Diagnosis: Calculus of gallbladder with acute cholecystitis without obstruction [K80.00]    The above referenced H&P was reviewed by Eugenia Corley MD on 1/14/2021, the patient was examined and no significant changes have occurred in the patient's co

## 2021-01-14 NOTE — ANESTHESIA PROCEDURE NOTES
Airway  Urgency: Elective      General Information and Staff    Patient location during procedure: OR  Anesthesiologist: Aviva Oquendo MD  Performed: anesthesiologist     Indications and Patient Condition  Indications for airway management: anesthesia

## 2021-01-15 NOTE — OPERATIVE REPORT
[facility]    PATIENT'S NAME: PERLA WHITE   ATTENDING PHYSICIAN: Shahid Wells MD   OPERATING PHYSICIAN: Shahid Wells MD   PATIENT ACCOUNT#:   [id number]    LOCATION:  [address]  MEDICAL RECORD #:   [id number]       D with 0 Vicryl. Skin closed with 3-0 Monocryl, benzoin, Steri-Strips. Dictated By Martir Yap MD  d: 01/14/2021 09:55:33  t: 01/14/2021 15:50:39  [facility] [id number]  TL/    cc: Claire Rios.  Lamar Lopez MD

## 2021-02-01 ENCOUNTER — OFFICE VISIT (OUTPATIENT)
Dept: SURGERY | Facility: CLINIC | Age: 61
End: 2021-02-01
Payer: MEDICAID

## 2021-02-01 VITALS — HEIGHT: 72 IN | WEIGHT: 233 LBS | BODY MASS INDEX: 31.56 KG/M2

## 2021-02-01 DIAGNOSIS — Z98.890 POST-OPERATIVE STATE: Primary | ICD-10-CM

## 2021-02-01 PROCEDURE — 99024 POSTOP FOLLOW-UP VISIT: CPT | Performed by: SURGERY

## 2021-02-01 PROCEDURE — 3008F BODY MASS INDEX DOCD: CPT | Performed by: SURGERY

## 2021-02-01 NOTE — PROGRESS NOTES
Postoperative Patient Follow-up      2/1/2021    HPI  Patient presents with:  Post-Op: Patient here for post op Laparoscopic Cholecystectomy. Patient denies fever. Patient has minimal discomfort. Patient eating and drinking well.  Patient having normla B

## 2021-02-03 DIAGNOSIS — Z23 NEED FOR VACCINATION: ICD-10-CM

## 2021-02-04 RX ORDER — LISINOPRIL AND HYDROCHLOROTHIAZIDE 20; 12.5 MG/1; MG/1
TABLET ORAL
Qty: 90 TABLET | Refills: 1 | Status: SHIPPED | OUTPATIENT
Start: 2021-02-04 | End: 2021-10-20

## 2021-03-11 ENCOUNTER — IMMUNIZATION (OUTPATIENT)
Dept: LAB | Age: 61
End: 2021-03-11
Attending: HOSPITALIST
Payer: MEDICAID

## 2021-03-11 DIAGNOSIS — Z23 NEED FOR VACCINATION: Primary | ICD-10-CM

## 2021-03-11 PROCEDURE — 0001A SARSCOV2 VAC 30MCG/0.3ML IM: CPT

## 2021-03-12 NOTE — PLAN OF CARE
Problem: Patient Centered Care  Goal: Patient preferences are identified and integrated in the patient's plan of care  Interventions:  - What would you like us to know as we care for you? I live with my parents.   - Provide timely, complete, and accurate in Prediabetes        Past Surgical History:   Procedure Laterality Date    BLEPHAROPLASTY      14 Dr Naa Ibrahim- elective- silicone    COLONOSCOPY      ENDOSCOPY, COLON, DIAGNOSTIC      HYSTERECTOMY      HYSTERECTOMY, TOTAL ABDOMINAL  1986    THYROID SURGERY      excision of adenoma; partial right thyroidectomy       Family History   Problem Relation Age of Onset    Cancer Other     Heart Disease Other     Heart Failure Mother          age 80    Lung Cancer Father          age 76       CareTeam (Including outside providers/suppliers regularly involved in providing care):   Patient Care Team:  Sophie Luque MD as PCP - General (Internal Medicine)  Sophie Luque MD as PCP - [facility]    Wt Readings from Last 3 Encounters:   21 177 lb (80.3 kg)   10/27/20 165 lb (74.8 kg)   20 173 lb (78.5 kg)     Vitals:    21 1305   BP: 124/80   Weight: 177 lb (80.3 kg)   Height: 5' (1.524 m)     Body mass index is 34.57 kg/m². Based upon direct observation of the patient, evaluation of cognition reveals recent and remote memory intact.     General Appearance: alert and oriented to person, place and time, well developed and well- nourished, in no acute distress  Skin: warm and dry, no rash or erythema  Head: normocephalic and atraumatic  Eyes: pupils equal, round, and reactive to light, extraocular eye movements intact, conjunctivae normal  ENT: tympanic membrane, external ear and ear canal normal bilaterally, nose without deformity, nasal mucosa and turbinates normal without polyps  Neck: supple and non-tender without mass, no thyromegaly or thyroid nodules, no cervical lymphadenopathy  Pulmonary/Chest: clear to auscultation bilaterally- no wheezes, rales or rhonchi, normal air movement, no respiratory distress  Cardiovascular: normal rate, regular rhythm, normal S1 and S2, no murmurs, rubs, clicks, or injury  INTERVENTIONS:  - Assess pt frequently for physical needs  - Identify cognitive and physical deficits and behaviors that affect risk of falls.   - [place] fall precautions as indicated by assessment.  - Educate pt/family on patient safety includin Interventions:  · None      Personalized Preventive Plan   Current Health Maintenance Status  Immunization History   Administered Date(s) Administered    COVID-19, Moderna, PF, 100mcg/0.5mL 03/03/2021    Influenza Vaccine, unspecified formulation 10/02/2013    Influenza, High Dose (Fluzone 65 yrs and older) 10/22/2017, 09/24/2018    Influenza, Triv, inactivated, subunit, adjuvanted, IM (Fluad 65 yrs and older) 09/25/2019, 09/15/2020, 10/27/2020    Pneumococcal Conjugate 13-valent ([id number]) 02/01/2016    Pneumococcal Polysaccharide ([id number]) 12/20/2005, 01/27/2015    Td, unspecified formulation 08/22/2016    Zoster Live (Zostavax) 11/11/2010    Zoster Recombinant (Shingrix) 06/12/2019, 08/13/2019        Health Maintenance   Topic Date Due    Colon cancer screen colonoscopy  03/18/2014    Annual Wellness Visit (AWV)  07/24/2021 (Originally 5/29/2019)    DTaP/Tdap/Td vaccine (1 - Tdap) 12/30/2022 (Originally 5/22/1957)    COVID-19 Vaccine (2 - Moderna 2-dose series) 03/31/2021    TSH testing  08/17/2021    Lipid screen  03/02/2022    Breast cancer screen  03/03/2023    DEXA (modify frequency per FRAX score)  Completed    Flu vaccine  Completed    Shingles Vaccine  Completed    Pneumococcal 65+ years Vaccine  Completed    Hepatitis A vaccine  Aged Out    Hepatitis B vaccine  Aged Out    Hib vaccine  Aged Out    Meningococcal (ACWY) vaccine  Aged Out     Recommendations for [employer] Due: see orders and patient instructions/AVS.  . Recommended screening schedule for the next 5-10 years is provided to the patient in written form: see Patient Pebbles Chavez was seen today for medicare awv. Diagnoses and all orders for this visit:    Prediabetes  Fetus is doing well. She is on glyburide and her fasting blood sugar was 120. Her hemoglobin A1c is 5.9 down from 6.1 last time. She is doing great we will continue as she is doing.       Hypothyroidism (acquired) Hypothyroidism that is euthyroid on her current dose of Synthroid 50 mcg daily. T4 and TSH look good and she will continue the same dose. Pure hypercholesterolemia  Hypercholesterolemia. She is on simvastatin 40 mg. Her LDL is less than 100. Her total is less than 200. Triglycerides less than 150. Liver looks good. She will continue the same dose. She is doing great. She looks great. She is negative on depression screen. I will see her back again in 6 months with lab.  8-15 minutes has been spent assessing, reviewing and discussing the depression screening.

## 2021-04-01 ENCOUNTER — IMMUNIZATION (OUTPATIENT)
Dept: LAB | Age: 61
End: 2021-04-01
Attending: HOSPITALIST
Payer: MEDICAID

## 2021-04-01 DIAGNOSIS — Z23 NEED FOR VACCINATION: Primary | ICD-10-CM

## 2021-04-01 PROCEDURE — 0002A SARSCOV2 VAC 30MCG/0.3ML IM: CPT

## 2021-05-06 ENCOUNTER — TELEPHONE (OUTPATIENT)
Dept: GASTROENTEROLOGY | Facility: CLINIC | Age: 61
End: 2021-05-06

## 2021-06-10 ENCOUNTER — LAB ENCOUNTER (OUTPATIENT)
Dept: LAB | Facility: HOSPITAL | Age: 61
End: 2021-06-10
Attending: INTERNAL MEDICINE
Payer: MEDICAID

## 2021-06-10 DIAGNOSIS — R74.8 ABNORMAL LIVER ENZYMES: ICD-10-CM

## 2021-06-10 PROCEDURE — 80076 HEPATIC FUNCTION PANEL: CPT

## 2021-06-10 PROCEDURE — 36415 COLL VENOUS BLD VENIPUNCTURE: CPT

## 2021-10-08 ENCOUNTER — IMMUNIZATION (OUTPATIENT)
Dept: LAB | Facility: HOSPITAL | Age: 61
End: 2021-10-08
Attending: EMERGENCY MEDICINE
Payer: MEDICAID

## 2021-10-08 DIAGNOSIS — Z23 NEED FOR VACCINATION: Primary | ICD-10-CM

## 2021-10-08 PROCEDURE — 0003A SARSCOV2 VAC 30MCG/0.3ML IM: CPT

## 2021-10-20 ENCOUNTER — OFFICE VISIT (OUTPATIENT)
Dept: INTERNAL MEDICINE CLINIC | Facility: CLINIC | Age: 61
End: 2021-10-20
Payer: MEDICAID

## 2021-10-20 ENCOUNTER — LAB ENCOUNTER (OUTPATIENT)
Dept: LAB | Facility: HOSPITAL | Age: 61
End: 2021-10-20
Attending: INTERNAL MEDICINE
Payer: MEDICAID

## 2021-10-20 VITALS
WEIGHT: 242.81 LBS | BODY MASS INDEX: 32.89 KG/M2 | SYSTOLIC BLOOD PRESSURE: 136 MMHG | DIASTOLIC BLOOD PRESSURE: 72 MMHG | HEIGHT: 72 IN | HEART RATE: 58 BPM

## 2021-10-20 DIAGNOSIS — I10 ESSENTIAL HYPERTENSION: ICD-10-CM

## 2021-10-20 DIAGNOSIS — E78.5 DYSLIPIDEMIA: ICD-10-CM

## 2021-10-20 DIAGNOSIS — Z00.00 ANNUAL PHYSICAL EXAM: ICD-10-CM

## 2021-10-20 DIAGNOSIS — I70.0 AORTIC ATHEROSCLEROSIS (HCC): ICD-10-CM

## 2021-10-20 DIAGNOSIS — Z00.00 ANNUAL PHYSICAL EXAM: Primary | ICD-10-CM

## 2021-10-20 DIAGNOSIS — K76.0 HEPATIC STEATOSIS: ICD-10-CM

## 2021-10-20 DIAGNOSIS — D12.6 ADENOMATOUS POLYP OF COLON, UNSPECIFIED PART OF COLON: ICD-10-CM

## 2021-10-20 PROCEDURE — 90686 IIV4 VACC NO PRSV 0.5 ML IM: CPT | Performed by: INTERNAL MEDICINE

## 2021-10-20 PROCEDURE — 3008F BODY MASS INDEX DOCD: CPT | Performed by: INTERNAL MEDICINE

## 2021-10-20 PROCEDURE — 80061 LIPID PANEL: CPT | Performed by: INTERNAL MEDICINE

## 2021-10-20 PROCEDURE — 36415 COLL VENOUS BLD VENIPUNCTURE: CPT | Performed by: INTERNAL MEDICINE

## 2021-10-20 PROCEDURE — 83036 HEMOGLOBIN GLYCOSYLATED A1C: CPT | Performed by: INTERNAL MEDICINE

## 2021-10-20 PROCEDURE — 90471 IMMUNIZATION ADMIN: CPT | Performed by: INTERNAL MEDICINE

## 2021-10-20 PROCEDURE — 80053 COMPREHEN METABOLIC PANEL: CPT | Performed by: INTERNAL MEDICINE

## 2021-10-20 PROCEDURE — 3075F SYST BP GE 130 - 139MM HG: CPT | Performed by: INTERNAL MEDICINE

## 2021-10-20 PROCEDURE — 99396 PREV VISIT EST AGE 40-64: CPT | Performed by: INTERNAL MEDICINE

## 2021-10-20 PROCEDURE — 3078F DIAST BP <80 MM HG: CPT | Performed by: INTERNAL MEDICINE

## 2021-10-20 PROCEDURE — 85027 COMPLETE CBC AUTOMATED: CPT | Performed by: INTERNAL MEDICINE

## 2021-10-20 RX ORDER — LISINOPRIL AND HYDROCHLOROTHIAZIDE 20; 12.5 MG/1; MG/1
1 TABLET ORAL DAILY
Qty: 90 TABLET | Refills: 1 | Status: SHIPPED | OUTPATIENT
Start: 2021-10-20

## 2021-10-20 NOTE — PATIENT INSTRUCTIONS
Your blood pressure today was good at 136/72 and your exam was normal.  You received a flu shot today. Await results of today's blood tests. Continue current medication. Please try to eat healthy, exercise regularly and lose some weight.   Return visit i

## 2021-10-20 NOTE — H&P
Martha Murphy is a 57year old male who presents for a complete physical exam, as well as for follow-up of hypertension and hypercholesterolemia.   HPI:   His last visit here was in January for a preoperative physical prior to laparoscopic cho Amoxicillin             HIVES  Radiology Contrast *    QUENTIN RIVERA   Past Medical History:   Diagnosis Date   • Acute cholecystitis 12/2020    Acute cholecystitis with E. coli bacteremia Rx cholecystostomy tube; s/p laparoscopic cholecystectomy 1- 4 Cans of beer, 4 Shots of liquor per week      Comment: Quit 11-20    Drug use: No          REVIEW OF SYSTEMS:   GENERAL: No fever  LUNGS: No cough wheezing or shortness of breath  CARDIAC: Occasional lightheadedness.   No palpitations or chest pain  GI: O therapy    4. History of adenomatous polyp of colon, unspecified part of colon  Up-to-date on colonoscopy    5. Hepatic steatosis  Await labs. Advice regarding diet, exercise and weight loss as above. 6. Aortic atherosclerosis (HCC)  Asymptomatic.   Co

## 2022-02-19 ENCOUNTER — NURSE TRIAGE (OUTPATIENT)
Dept: INTERNAL MEDICINE CLINIC | Facility: CLINIC | Age: 62
End: 2022-02-19

## 2022-02-22 ENCOUNTER — OFFICE VISIT (OUTPATIENT)
Dept: INTERNAL MEDICINE CLINIC | Facility: CLINIC | Age: 62
End: 2022-02-22
Payer: MEDICAID

## 2022-02-22 ENCOUNTER — LAB ENCOUNTER (OUTPATIENT)
Dept: LAB | Facility: HOSPITAL | Age: 62
End: 2022-02-22
Attending: INTERNAL MEDICINE
Payer: MEDICAID

## 2022-02-22 VITALS
SYSTOLIC BLOOD PRESSURE: 136 MMHG | BODY MASS INDEX: 31.8 KG/M2 | HEIGHT: 72 IN | WEIGHT: 234.81 LBS | DIASTOLIC BLOOD PRESSURE: 74 MMHG

## 2022-02-22 DIAGNOSIS — I10 ESSENTIAL HYPERTENSION: ICD-10-CM

## 2022-02-22 DIAGNOSIS — R10.12 LEFT UPPER QUADRANT ABDOMINAL PAIN: Primary | ICD-10-CM

## 2022-02-22 LAB
ALBUMIN SERPL-MCNC: 4.6 G/DL (ref 3.4–5)
ALBUMIN/GLOB SERPL: 1.3 {RATIO} (ref 1–2)
ALP LIVER SERPL-CCNC: 71 U/L
ALT SERPL-CCNC: 67 U/L
ANION GAP SERPL CALC-SCNC: 6 MMOL/L (ref 0–18)
AST SERPL-CCNC: 35 U/L (ref 15–37)
BILIRUB SERPL-MCNC: 0.9 MG/DL (ref 0.1–2)
BILIRUB UR QL: NEGATIVE
BUN BLD-MCNC: 17 MG/DL (ref 7–18)
BUN/CREAT SERPL: 17.3 (ref 10–20)
CALCIUM BLD-MCNC: 9.5 MG/DL (ref 8.5–10.1)
CHLORIDE SERPL-SCNC: 95 MMOL/L (ref 98–112)
CO2 SERPL-SCNC: 29 MMOL/L (ref 21–32)
COLOR UR: YELLOW
CREAT BLD-MCNC: 0.98 MG/DL
DEPRECATED RDW RBC AUTO: 38 FL (ref 35.1–46.3)
FASTING STATUS PATIENT QL REPORTED: NO
GLOBULIN PLAS-MCNC: 3.6 G/DL (ref 2.8–4.4)
GLUCOSE UR-MCNC: NEGATIVE MG/DL
HCT VFR BLD AUTO: 49.2 %
HGB BLD-MCNC: 17.5 G/DL
HGB UR QL STRIP.AUTO: NEGATIVE
KETONES UR-MCNC: NEGATIVE MG/DL
LEUKOCYTE ESTERASE UR QL STRIP.AUTO: NEGATIVE
LIPASE SERPL-CCNC: 258 U/L (ref 73–393)
MCH RBC QN AUTO: 32.7 PG (ref 26–34)
MCHC RBC AUTO-ENTMCNC: 35.6 G/DL (ref 31–37)
MCV RBC AUTO: 92 FL
NITRITE UR QL STRIP.AUTO: NEGATIVE
OSMOLALITY SERPL CALC.SUM OF ELEC: 272 MOSM/KG (ref 275–295)
PH UR: 6 [PH] (ref 5–8)
PLATELET # BLD AUTO: 215 10(3)UL (ref 150–450)
POTASSIUM SERPL-SCNC: 4.6 MMOL/L (ref 3.5–5.1)
PROT SERPL-MCNC: 8.2 G/DL (ref 6.4–8.2)
PROT UR-MCNC: NEGATIVE MG/DL
RBC # BLD AUTO: 5.35 X10(6)UL
SODIUM SERPL-SCNC: 130 MMOL/L (ref 136–145)
SP GR UR STRIP: 1.02 (ref 1–1.03)
UROBILINOGEN UR STRIP-ACNC: <2
WBC # BLD AUTO: 6.4 X10(3) UL (ref 4–11)

## 2022-02-22 PROCEDURE — 85027 COMPLETE CBC AUTOMATED: CPT | Performed by: INTERNAL MEDICINE

## 2022-02-22 PROCEDURE — 81001 URINALYSIS AUTO W/SCOPE: CPT | Performed by: INTERNAL MEDICINE

## 2022-02-22 PROCEDURE — 36415 COLL VENOUS BLD VENIPUNCTURE: CPT | Performed by: INTERNAL MEDICINE

## 2022-02-22 PROCEDURE — 3075F SYST BP GE 130 - 139MM HG: CPT | Performed by: INTERNAL MEDICINE

## 2022-02-22 PROCEDURE — 3008F BODY MASS INDEX DOCD: CPT | Performed by: INTERNAL MEDICINE

## 2022-02-22 PROCEDURE — 83690 ASSAY OF LIPASE: CPT | Performed by: INTERNAL MEDICINE

## 2022-02-22 PROCEDURE — 3078F DIAST BP <80 MM HG: CPT | Performed by: INTERNAL MEDICINE

## 2022-02-22 PROCEDURE — 99214 OFFICE O/P EST MOD 30 MIN: CPT | Performed by: INTERNAL MEDICINE

## 2022-02-22 PROCEDURE — 80053 COMPREHEN METABOLIC PANEL: CPT | Performed by: INTERNAL MEDICINE

## 2022-02-22 NOTE — PATIENT INSTRUCTIONS
Await results of today's blood and urine testing. Call if pain is no better in 2-3 days at which time we should order a CT scan.

## 2022-04-30 RX ORDER — LISINOPRIL AND HYDROCHLOROTHIAZIDE 20; 12.5 MG/1; MG/1
1 TABLET ORAL DAILY
Qty: 90 TABLET | Refills: 1 | Status: SHIPPED | OUTPATIENT
Start: 2022-04-30 | End: 2022-12-03

## 2022-04-30 NOTE — TELEPHONE ENCOUNTER
Refill passed per [facility] protocol. Requested Prescriptions   Pending Prescriptions Disp Refills    LISINOPRIL-HYDROCHLOROTHIAZIDE 20-12.5 MG Oral Tab [Pharmacy Med Name: LISINOPRIL/HCTZ 20-12.5MG TABLETS] 90 tablet 1     Sig: Take 1 tablet by mouth daily.         Hypertensive Medications Protocol Passed - 4/30/2022 11:06 AM        Passed - CMP or BMP in past 12 months        Passed - Appointment in past 6 or next 3 months        Passed - GFR Non- > 50     Lab Results   Component Value Date    GFRNAA 82 02/22/2022                      Recent Outpatient Visits              2 months ago Left upper quadrant abdominal pain    [address] Thaddeus Quijano MD    Office Visit    6 months ago Annual physical exam    [address] Thaddeus Quijano MD    Office Visit    1 year ago Post-operative [facility] Flakito Tomlin MD    Office Visit    1 year ago Preoperative examination    [address] [facility] [place], [address], Thaddeus Cote MD    Office Visit    1 year ago Calculus of gallbladder with acute cholecystitis without obstruction    [address] [facility] Flakito Tomlin MD    Office Visit

## 2022-06-29 ENCOUNTER — OFFICE VISIT (OUTPATIENT)
Dept: INTERNAL MEDICINE CLINIC | Facility: CLINIC | Age: 62
End: 2022-06-29
Payer: MEDICAID

## 2022-06-29 VITALS
WEIGHT: 238.19 LBS | BODY MASS INDEX: 32.26 KG/M2 | SYSTOLIC BLOOD PRESSURE: 136 MMHG | HEART RATE: 62 BPM | DIASTOLIC BLOOD PRESSURE: 74 MMHG | HEIGHT: 72 IN

## 2022-06-29 DIAGNOSIS — R21 RASH: Primary | ICD-10-CM

## 2022-06-29 PROCEDURE — 3078F DIAST BP <80 MM HG: CPT | Performed by: INTERNAL MEDICINE

## 2022-06-29 PROCEDURE — 3075F SYST BP GE 130 - 139MM HG: CPT | Performed by: INTERNAL MEDICINE

## 2022-06-29 PROCEDURE — 99213 OFFICE O/P EST LOW 20 MIN: CPT | Performed by: INTERNAL MEDICINE

## 2022-06-29 PROCEDURE — 3008F BODY MASS INDEX DOCD: CPT | Performed by: INTERNAL MEDICINE

## 2022-06-29 RX ORDER — DOXYCYCLINE HYCLATE 100 MG/1
100 CAPSULE ORAL 2 TIMES DAILY
Qty: 20 CAPSULE | Refills: 0 | Status: SHIPPED | OUTPATIENT
Start: 2022-06-29 | End: 2022-07-09

## 2022-06-29 NOTE — PATIENT INSTRUCTIONS
Please take doxycycline 100 mg twice daily for 10 days. Apply hydrocortisone cream to your rash. Call if no better. Please schedule a physical in October.

## 2022-12-21 ENCOUNTER — HOSPITAL ENCOUNTER (EMERGENCY)
Facility: HOSPITAL | Age: 62
Discharge: HOME | End: 2022-12-21
Attending: EMERGENCY MEDICINE
Payer: MEDICAID

## 2022-12-21 ENCOUNTER — APPOINTMENT (OUTPATIENT)
Dept: CT IMAGING | Facility: HOSPITAL | Age: 62
End: 2022-12-21
Attending: EMERGENCY MEDICINE
Payer: MEDICAID

## 2022-12-21 VITALS
BODY MASS INDEX: 31.15 KG/M2 | SYSTOLIC BLOOD PRESSURE: 166 MMHG | OXYGEN SATURATION: 99 % | TEMPERATURE: 98 F | RESPIRATION RATE: 18 BRPM | DIASTOLIC BLOOD PRESSURE: 99 MMHG | HEIGHT: 72 IN | WEIGHT: 230 LBS | HEART RATE: 87 BPM

## 2022-12-21 DIAGNOSIS — E87.1 HYPONATREMIA: ICD-10-CM

## 2022-12-21 DIAGNOSIS — R10.9 ABDOMINAL PAIN, ACUTE: Primary | ICD-10-CM

## 2022-12-21 LAB
ANION GAP SERPL CALC-SCNC: 6 MMOL/L (ref 0–18)
BASOPHILS # BLD AUTO: 0.03 X10(3) UL (ref 0–0.2)
BASOPHILS NFR BLD AUTO: 0.5 %
BILIRUB UR QL CFM: NEGATIVE
BUN BLD-MCNC: 16 MG/DL (ref 7–18)
BUN/CREAT SERPL: 15.5 (ref 10–20)
CALCIUM BLD-MCNC: 9 MG/DL (ref 8.5–10.1)
CHLORIDE SERPL-SCNC: 98 MMOL/L (ref 98–112)
CLARITY UR: CLEAR
CO2 SERPL-SCNC: 24 MMOL/L (ref 21–32)
COLOR UR: YELLOW
CREAT BLD-MCNC: 1.03 MG/DL
DEPRECATED RDW RBC AUTO: 36.3 FL (ref 35.1–46.3)
EOSINOPHIL # BLD AUTO: 0.12 X10(3) UL (ref 0–0.7)
EOSINOPHIL NFR BLD AUTO: 1.8 %
ERYTHROCYTE [DISTWIDTH] IN BLOOD BY AUTOMATED COUNT: 11.1 % (ref 11–15)
GFR SERPLBLD BASED ON 1.73 SQ M-ARVRAT: 82 ML/MIN/1.73M2 (ref 60–?)
GLUCOSE BLD-MCNC: 139 MG/DL (ref 70–99)
GLUCOSE UR-MCNC: NEGATIVE MG/DL
HCT VFR BLD AUTO: 50 %
HGB BLD-MCNC: 18.2 G/DL
HGB UR QL STRIP.AUTO: NEGATIVE
IMM GRANULOCYTES # BLD AUTO: 0.04 X10(3) UL (ref 0–1)
IMM GRANULOCYTES NFR BLD: 0.6 %
KETONES UR-MCNC: NEGATIVE MG/DL
LEUKOCYTE ESTERASE UR QL STRIP.AUTO: NEGATIVE
LIPASE SERPL-CCNC: 220 U/L (ref 73–393)
LYMPHOCYTES # BLD AUTO: 1.4 X10(3) UL (ref 1–4)
LYMPHOCYTES NFR BLD AUTO: 21.2 %
MCH RBC QN AUTO: 32.6 PG (ref 26–34)
MCHC RBC AUTO-ENTMCNC: 36.4 G/DL (ref 31–37)
MCV RBC AUTO: 89.4 FL
MONOCYTES # BLD AUTO: 0.71 X10(3) UL (ref 0.1–1)
MONOCYTES NFR BLD AUTO: 10.7 %
NEUTROPHILS # BLD AUTO: 4.31 X10 (3) UL (ref 1.5–7.7)
NEUTROPHILS # BLD AUTO: 4.31 X10(3) UL (ref 1.5–7.7)
NEUTROPHILS NFR BLD AUTO: 65.2 %
NITRITE UR QL STRIP.AUTO: NEGATIVE
OSMOLALITY SERPL CALC.SUM OF ELEC: 269 MOSM/KG (ref 275–295)
PH UR: 5.5 [PH] (ref 5–8)
PLATELET # BLD AUTO: 259 10(3)UL (ref 150–450)
RBC # BLD AUTO: 5.59 X10(6)UL
SODIUM SERPL-SCNC: 128 MMOL/L (ref 136–145)
SP GR UR STRIP: 1.02 (ref 1–1.03)
UROBILINOGEN UR STRIP-ACNC: 0.2
WBC # BLD AUTO: 6.6 X10(3) UL (ref 4–11)

## 2022-12-21 PROCEDURE — 81003 URINALYSIS AUTO W/O SCOPE: CPT | Performed by: EMERGENCY MEDICINE

## 2022-12-21 PROCEDURE — 80048 BASIC METABOLIC PNL TOTAL CA: CPT | Performed by: EMERGENCY MEDICINE

## 2022-12-21 PROCEDURE — 74176 CT ABD & PELVIS W/O CONTRAST: CPT | Performed by: EMERGENCY MEDICINE

## 2022-12-21 PROCEDURE — 99284 EMERGENCY DEPT VISIT MOD MDM: CPT

## 2022-12-21 PROCEDURE — 85025 COMPLETE CBC W/AUTO DIFF WBC: CPT | Performed by: EMERGENCY MEDICINE

## 2022-12-21 PROCEDURE — 36415 COLL VENOUS BLD VENIPUNCTURE: CPT

## 2022-12-21 PROCEDURE — 83690 ASSAY OF LIPASE: CPT | Performed by: EMERGENCY MEDICINE

## 2022-12-21 NOTE — ED INITIAL ASSESSMENT (HPI)
Patient from home with c/o worsening right sided abdominal pain for the past week. C/o diarrhea that began last night. Denies nausea, vomiting.

## 2023-01-03 ENCOUNTER — OFFICE VISIT (OUTPATIENT)
Dept: INTERNAL MEDICINE CLINIC | Facility: CLINIC | Age: 63
End: 2023-01-03
Payer: MEDICAID

## 2023-01-03 VITALS
TEMPERATURE: 98 F | BODY MASS INDEX: 31.28 KG/M2 | HEIGHT: 73 IN | WEIGHT: 236 LBS | DIASTOLIC BLOOD PRESSURE: 76 MMHG | RESPIRATION RATE: 18 BRPM | SYSTOLIC BLOOD PRESSURE: 136 MMHG | HEART RATE: 72 BPM

## 2023-01-03 DIAGNOSIS — D12.6 ADENOMATOUS POLYP OF COLON, UNSPECIFIED PART OF COLON: ICD-10-CM

## 2023-01-03 DIAGNOSIS — I10 ESSENTIAL HYPERTENSION: ICD-10-CM

## 2023-01-03 DIAGNOSIS — Z00.00 ANNUAL PHYSICAL EXAM: Primary | ICD-10-CM

## 2023-01-03 DIAGNOSIS — I70.0 AORTIC ATHEROSCLEROSIS (HCC): ICD-10-CM

## 2023-01-03 DIAGNOSIS — E78.5 DYSLIPIDEMIA: ICD-10-CM

## 2023-01-03 DIAGNOSIS — K76.0 HEPATIC STEATOSIS: ICD-10-CM

## 2023-01-03 PROCEDURE — 99396 PREV VISIT EST AGE 40-64: CPT | Performed by: INTERNAL MEDICINE

## 2023-01-03 PROCEDURE — 3075F SYST BP GE 130 - 139MM HG: CPT | Performed by: INTERNAL MEDICINE

## 2023-01-03 PROCEDURE — 3008F BODY MASS INDEX DOCD: CPT | Performed by: INTERNAL MEDICINE

## 2023-01-03 PROCEDURE — 3078F DIAST BP <80 MM HG: CPT | Performed by: INTERNAL MEDICINE

## 2023-01-03 NOTE — PATIENT INSTRUCTIONS
Your blood pressure today was good at 136/76. Please get 2 doses of Shingrix vaccine at your pharmacy. Come in soon when you can for blood tests. Continue current medication. Please try to follow a healthy diet and exercise regularly. Return visit in 6 months for blood pressure check.

## 2023-07-09 RX ORDER — LISINOPRIL AND HYDROCHLOROTHIAZIDE 20; 12.5 MG/1; MG/1
1 TABLET ORAL DAILY
Qty: 90 TABLET | Refills: 3 | OUTPATIENT
Start: 2023-07-09

## 2023-07-09 NOTE — TELEPHONE ENCOUNTER
Duplicate request, previously addressed. Too soon    lisinopril-hydroCHLOROthiazide 20-12.5 MG Oral Tab 90 tablet 3 4/4/2023     Sig - Route:  Take 1 tablet by mouth daily. - Oral    Sent to pharmacy as: Lisinopril-hydroCHLOROthiazide 20-12.5 MG Oral Tablet (Zestoretic)    E-Prescribing Status: Receipt confirmed by pharmacy (4/4/2023 10:37 AM CDT)

## 2023-09-10 NOTE — TELEPHONE ENCOUNTER
Dr. Zain Gray    The CT of the Abdomen has been denied by insurance.   Would you like to file an appeal?  If so, they need documentation (a letter from you?) stating patient has one of the below criteria: presence of Gi Bleeding, Moderate to severe abdominal te
Hi Dr. Jarvis Medina,    The CT you ordered for Jose Osman has been denied by his insurance. Patient has been notified and advised to f/u with you for his future plan of care.     Thank you  Sheldon Oleary
Noted.   I already contacted patient yesterday by phone. He is aware that in order to file an appeal for the CT scan we need a signed authorized designated representative form.   He preferred that I mail this form to his home address for him to complete an
Please refer to telephone encounter from 12/21/2020. Patient was admitted to the hospital and had an MRI done so CT no longer needed per Dr. Iva Munoz so it was canceled.
We need to appeal the decision - pt has history of pancreatitis and needs follow up CT scan
English

## 2023-11-20 ENCOUNTER — TELEPHONE (OUTPATIENT)
Facility: CLINIC | Age: 63
End: 2023-11-20

## 2023-11-20 NOTE — TELEPHONE ENCOUNTER
----- Message from Laura Dooley RN sent at 4/3/2019  5:23 PM CDT -----  Regarding: colonoscopy recall  Recall colonoscopy with Dr Vika Montalvo for 5 yrs, due 1/23/2024.

## 2024-02-27 ENCOUNTER — PATIENT MESSAGE (OUTPATIENT)
Dept: INTERNAL MEDICINE CLINIC | Facility: CLINIC | Age: 64
End: 2024-02-27

## 2024-02-27 LAB — AMB EXT COVID-19 RESULT: DETECTED

## 2024-02-28 ENCOUNTER — NURSE TRIAGE (OUTPATIENT)
Dept: INTERNAL MEDICINE CLINIC | Facility: CLINIC | Age: 64
End: 2024-02-28

## 2024-02-28 NOTE — TELEPHONE ENCOUNTER
RN =please call the patient .Thanks.      ----- Message from Katie Mejia RN sent at 2/28/2024 11:24 AM CST -----  Regarding: FW: HI i just tested positive for covid  Contact: [phone]      ----- Message -----  From: Alejandro Lezama  Sent: 2/27/2024   7:57 PM CST  To: Em Triage Support  Subject: HI i just tested positive for covid              would you prescribe what drug i should take?

## 2024-02-28 NOTE — TELEPHONE ENCOUNTER
Infection banner updated .   CDC isolation guidelines sent via [employer] .   [employer] message with instruction per protocol.     See acute encounter 2/28/24.       From: Alejandro Lezama  To: Alejandro Humphries  Sent: 2/27/2024  7:57 PM CST  Subject: HI i just tested positive for covid    would you prescribe what drug i should take?

## 2024-02-28 NOTE — TELEPHONE ENCOUNTER
Action Requested: Summary for Provider     []  Critical Lab, Recommendations Needed  [] Need Additional Advice  [x]   FYI    []   Need Orders  [] Need Medications Sent to Pharmacy  []  Other     SUMMARY: + Covid; symptoms are mild and improving. Declined same-day virtual visit. Home Care Advice discussed, per protocol. Refer to system/assessment protocol for yes/no answers. [See below for more details]     Reason for call: Covid    Reason for Disposition   [1] COVID-19 diagnosed by positive lab test (e.g., PCR, rapid self-test kit) AND [2] mild symptoms (e.g., cough, fever, others) AND [3] no complications or SOB    Protocols used: Coronavirus (COVID-19) Diagnosed or [id number]      Patient called states he tested COVID + on: yesterday, 2/27/24. Date of ONSET: 2/26/24. CURRENTLY: Fever/Chills: subsided, present yesterday. Body Ache: mild, but chronic.   Fatigue: present--> moderate. Nasal: nasal congestion and rhinorrhea. Taste/Smell: intact. Throat: mild soreness present; able to open mouth completely. Appetite/Hydrating: Appetite--> normal ; Hydration--> Drinking 2 L each day; mouth is moist; urine output is normal. DENIES: Cough; SOB/Chest tightness/Chest pain; Headache; Nausea/Vomiting/Diarrhea; any Ears/Eyes/Skin symptoms. Supportive MEDICATIONS: none. Same-day virtual visit offered and declined as he is starting to feel better than yesterday. Home Care Advice discussed, per protocol. Patient is aware of the current CDC guidelines related to quarantine. Patient was advised to seek immediate medical attention if symptoms worsen; Especially if SOB, tightness of chest, and/or chest pain go to the emergency department. Patient verbalized understanding, no further questions or concerns at this time.

## 2024-04-13 ENCOUNTER — HOSPITAL ENCOUNTER (EMERGENCY)
Facility: HOSPITAL | Age: 64
Discharge: HOME | End: 2024-04-13
Attending: EMERGENCY MEDICINE
Payer: MEDICARE

## 2024-04-13 ENCOUNTER — APPOINTMENT (OUTPATIENT)
Dept: GENERAL RADIOLOGY | Facility: HOSPITAL | Age: 64
End: 2024-04-13
Attending: EMERGENCY MEDICINE
Payer: MEDICARE

## 2024-04-13 VITALS
RESPIRATION RATE: 19 BRPM | TEMPERATURE: 98 F | SYSTOLIC BLOOD PRESSURE: 127 MMHG | OXYGEN SATURATION: 96 % | WEIGHT: 234 LBS | HEIGHT: 72 IN | DIASTOLIC BLOOD PRESSURE: 84 MMHG | HEART RATE: 66 BPM | BODY MASS INDEX: 31.69 KG/M2

## 2024-04-13 DIAGNOSIS — R10.13 ABDOMINAL PAIN, EPIGASTRIC: Primary | ICD-10-CM

## 2024-04-13 DIAGNOSIS — R07.9 CHEST PAIN OF UNCERTAIN ETIOLOGY: ICD-10-CM

## 2024-04-13 LAB
ALBUMIN SERPL-MCNC: 4.7 G/DL (ref 3.2–4.8)
ALBUMIN/GLOB SERPL: 1.4 {RATIO} (ref 1–2)
ALP LIVER SERPL-CCNC: 81 U/L
ALT SERPL-CCNC: 101 U/L
ANION GAP SERPL CALC-SCNC: 8 MMOL/L (ref 0–18)
AST SERPL-CCNC: 69 U/L (ref ?–34)
BASOPHILS # BLD AUTO: 0.03 X10(3) UL (ref 0–0.2)
BASOPHILS NFR BLD AUTO: 0.6 %
BILIRUB SERPL-MCNC: 1.5 MG/DL (ref 0.2–1.1)
BUN BLD-MCNC: 11 MG/DL (ref 9–23)
BUN/CREAT SERPL: 11.8 (ref 10–20)
CALCIUM BLD-MCNC: 9.6 MG/DL (ref 8.7–10.4)
CHLORIDE SERPL-SCNC: 102 MMOL/L (ref 98–112)
CO2 SERPL-SCNC: 26 MMOL/L (ref 21–32)
CREAT BLD-MCNC: 0.93 MG/DL
DEPRECATED RDW RBC AUTO: 37.8 FL (ref 35.1–46.3)
EGFRCR SERPLBLD CKD-EPI 2021: 91 ML/MIN/1.73M2 (ref 60–?)
EOSINOPHIL # BLD AUTO: 0.07 X10(3) UL (ref 0–0.7)
EOSINOPHIL NFR BLD AUTO: 1.4 %
ERYTHROCYTE [DISTWIDTH] IN BLOOD BY AUTOMATED COUNT: 11.5 % (ref 11–15)
ETHANOL SERPL-MCNC: <3 MG/DL (ref ?–3)
GLOBULIN PLAS-MCNC: 3.3 G/DL (ref 2.8–4.4)
GLUCOSE BLD-MCNC: 127 MG/DL (ref 70–99)
HCT VFR BLD AUTO: 48.8 %
HGB BLD-MCNC: 18.3 G/DL
IMM GRANULOCYTES # BLD AUTO: 0.03 X10(3) UL (ref 0–1)
IMM GRANULOCYTES NFR BLD: 0.6 %
LIPASE SERPL-CCNC: 50 U/L (ref 13–75)
LYMPHOCYTES # BLD AUTO: 1.16 X10(3) UL (ref 1–4)
LYMPHOCYTES NFR BLD AUTO: 22.8 %
MCH RBC QN AUTO: 33.9 PG (ref 26–34)
MCHC RBC AUTO-ENTMCNC: 37.5 G/DL (ref 31–37)
MCV RBC AUTO: 90.4 FL
MONOCYTES # BLD AUTO: 0.43 X10(3) UL (ref 0.1–1)
MONOCYTES NFR BLD AUTO: 8.5 %
NEUTROPHILS # BLD AUTO: 3.36 X10 (3) UL (ref 1.5–7.7)
NEUTROPHILS # BLD AUTO: 3.36 X10(3) UL (ref 1.5–7.7)
NEUTROPHILS NFR BLD AUTO: 66.1 %
OSMOLALITY SERPL CALC.SUM OF ELEC: 283 MOSM/KG (ref 275–295)
PLATELET # BLD AUTO: 181 10(3)UL (ref 150–450)
POTASSIUM SERPL-SCNC: 4.3 MMOL/L (ref 3.5–5.1)
PROT SERPL-MCNC: 8 G/DL (ref 5.7–8.2)
RBC # BLD AUTO: 5.4 X10(6)UL
SODIUM SERPL-SCNC: 136 MMOL/L (ref 136–145)
TROPONIN I SERPL HS-MCNC: 3 NG/L
WBC # BLD AUTO: 5.1 X10(3) UL (ref 4–11)

## 2024-04-13 PROCEDURE — 80053 COMPREHEN METABOLIC PANEL: CPT | Performed by: EMERGENCY MEDICINE

## 2024-04-13 PROCEDURE — 84484 ASSAY OF TROPONIN QUANT: CPT | Performed by: EMERGENCY MEDICINE

## 2024-04-13 PROCEDURE — 36415 COLL VENOUS BLD VENIPUNCTURE: CPT

## 2024-04-13 PROCEDURE — 85025 COMPLETE CBC W/AUTO DIFF WBC: CPT | Performed by: EMERGENCY MEDICINE

## 2024-04-13 PROCEDURE — 83690 ASSAY OF LIPASE: CPT | Performed by: EMERGENCY MEDICINE

## 2024-04-13 PROCEDURE — 93010 ELECTROCARDIOGRAM REPORT: CPT

## 2024-04-13 PROCEDURE — 71045 X-RAY EXAM CHEST 1 VIEW: CPT | Performed by: EMERGENCY MEDICINE

## 2024-04-13 PROCEDURE — 99284 EMERGENCY DEPT VISIT MOD MDM: CPT

## 2024-04-13 PROCEDURE — 93005 ELECTROCARDIOGRAM TRACING: CPT

## 2024-04-13 PROCEDURE — 82077 ASSAY SPEC XCP UR&BREATH IA: CPT | Performed by: EMERGENCY MEDICINE

## 2024-04-13 PROCEDURE — 99283 EMERGENCY DEPT VISIT LOW MDM: CPT

## 2024-04-13 NOTE — ED PROVIDER NOTES
Patient Seen in: [facility] Emergency Department    History     Chief Complaint   Patient presents with    Chest Pain       HPI    64-year-old male presents ER with complaint of epigastric pain radiating to his left chest for the past 5 days.  Patient with past medical history of diverticulitis, alcohol pancreatitis, cholecystitis.  Patient states the pain has gotten worse over the last 5 days came to the ER be checked out.  Patient also complained of nausea.    History reviewed.   Past Medical History:    Acute cholecystitis    Acute cholecystitis with E. coli bacteremia Rx cholecystostomy tube; s/p laparoscopic cholecystectomy 1-21    Acute diverticulitis    Alcoholic pancreatitis (HCC)    Aortic atherosclerosis (HCC)    CXR 11-20    Colon polyps    Duodenal ulcer with hemorrhage    EGD with H. pylori negative    Dyslipidemia    Essential hypertension    Gastrointestinal hemorrhage    Melena; EGD with small hiatal hernia, gastric polyps, mild gastritis, biopsies benign, H. pylori negative; colonoscopy with diverticulosis and internal hemorrhoids    GERD (gastroesophageal reflux disease)    Hepatic steatosis    Ultrasound 1-19    Lyme disease    Erythema migrans, treated    Visual impairment    glasses       History reviewed.   Past Surgical History:   Procedure Laterality Date    Colonoscopy  2010    4th colon; Dr. Whitley    Colonoscopy      Colonoscopy N/A 3/5/2018    Procedure: COLONOSCOPY;  Surgeon: Brian Whitley MD;  Location: [facility] ENDOSCOPY    Colonoscopy N/A 1/23/2019    Procedure: COLONOSCOPY;  Surgeon: Brian Whitley MD;  Location: [facility] ENDOSCOPY    Ir cholecystostomy  12/22/2020    Laparoscopic cholecystectomy  01/2021         Medications :  (Not in a hospital admission)       Family History   Problem Relation Age of Onset    Diabetes Father     Hypertension Father     Heart Disease Father         Angina age 70s; CABG age 81    Colon Cancer Father     Other (Lung Cancer) Father     Other  (Parkinson's) Mother     Other (Parkinson's) Maternal Aunt     Breast Cancer Paternal Grandmother     Cancer Paternal Grandmother         breast cancer       Smoking Status:   Social History     Socioeconomic History    Marital status: Single   Occupational History    Occupation:    Tobacco Use    Smoking status: Never    Smokeless tobacco: Never   Vaping Use    Vaping status: Never Used   Substance and Sexual Activity    Alcohol use: Yes     Alcohol/week: 8.0 standard drinks of alcohol     Types: 4 Cans of beer, 4 Shots of liquor per week     Comment: 6 beers 2 days weekly    Drug use: No    Sexual activity: Not Currently     Partners: Female   Other Topics Concern    Caffeine Concern Yes     Comment: coffee, 2cups/day       ROS  All pertinent positives for the review of systems are mentioned in the HPI  All other organ systems are reviewed and are negative.    Constitutional and vital signs reviewed.      Social History and Family History elements reviewed from today, pertinent positives to the presenting problem noted.    Physical Exam     ED Triage Vitals [04/13/24 1026]   /89   Pulse 86   Resp 16   Temp 97.5 °F (36.4 °C)   Temp src Oral   SpO2 95 %   O2 Device None (Room air)       All measures to prevent infection transmission during my interaction with the patient were taken. The patient was already wearing a droplet mask on my arrival to the room. Personal protective equipment including droplet mask, eye protection, and gloves were worn throughout the duration of the exam.  Handwashing was performed prior to and after the exam.  Stethoscope and any equipment used during my examination was cleaned with super sani-cloth germicidal wipes following the exam.     Physical Exam  Vitals and nursing note reviewed.   Constitutional:       Appearance: He is well-developed.   HENT:      Head: Normocephalic and atraumatic.      Right Ear: External ear normal.      Left Ear: External ear normal.       Nose: Nose normal.   Eyes:      Conjunctiva/sclera: Conjunctivae normal.      Pupils: Pupils are equal, round, and reactive to light.   Cardiovascular:      Rate and Rhythm: Normal rate and regular rhythm.      Heart sounds: Normal heart sounds.   Pulmonary:      Effort: Pulmonary effort is normal.      Breath sounds: Normal breath sounds.   Abdominal:      General: Bowel sounds are normal.      Palpations: Abdomen is soft.      Tenderness: There is abdominal tenderness in the epigastric area. There is no right CVA tenderness, left CVA tenderness, guarding or rebound.   Musculoskeletal:         General: Normal range of motion.      Cervical back: Normal range of motion and neck supple.   Skin:     General: Skin is warm and dry.   Neurological:      General: No focal deficit present.      Mental Status: He is alert and oriented to person, place, and time.      Deep Tendon Reflexes: Reflexes are normal and symmetric.   Psychiatric:         Behavior: Behavior normal.         Thought Content: Thought content normal.         Judgment: Judgment normal.         ED Course        Labs Reviewed   COMP METABOLIC PANEL (14) - Abnormal; Notable for the following components:       Result Value    Glucose 127 (*)      (*)     AST 69 (*)     Bilirubin, Total 1.5 (*)     All other components within normal limits   CBC W/ DIFFERENTIAL - Abnormal; Notable for the following components:    HGB 18.3 (*)     MCHC 37.5 (*)     All other components within normal limits   TROPONIN I HIGH SENSITIVITY - Normal   LIPASE - Normal   ETHYL ALCOHOL - Normal   CBC WITH DIFFERENTIAL WITH PLATELET    Narrative:     The following orders were created for panel order CBC With Differential With Platelet.                  Procedure                               Abnormality         Status                                     ---------                               -----------         ------                                     CBC W/  DIFFERENTIAL[[id number]]          Abnormal            Final result                                                 Please view results for these tests on the individual orders.     EKG    Rate, intervals and axes as noted on EKG Report.  Rate: 80  Rhythm: Sinus Rhythm  Reading: Left axis deviation, no ST elevation,             Imaging Results Available and Reviewed while in ED: No results found.  ED Medications Administered: Medications - No data to display      MDM     Vitals:    04/13/24 1026 04/13/24 1230   BP: 147/89 127/84   Pulse: 86 66   Resp: 16 19   Temp: 97.5 °F (36.4 °C)    TempSrc: Oral    SpO2: 95% 96%   Weight: 106.1 kg    Height: 182.9 cm (6')      *I personally reviewed and interpreted all ED vitals.  I also personally reviewed all labs and imaging if ordered    Pulse Ox: 95%, Room air, Normal     Monitor Interpretation:   normal sinus rhythm    Differential Diagnosis/ Diagnostic Considerations: Chest pain, chest wall pain, pancreatitis, cholecystitis.    Medical Record Review: I personally reviewed available prior medical records for any recent pertinent discharge summaries, testing, and procedures and reviewed those reports.    Complicating Factors: The patient already has does not have any pertinent problems on file. to contribute to the complexity of this ED evaluation.    Medical Decision Making  64-year-old male presents the ER with complaint of epigastric and chest discomfort with history of pancreatitis secondary to alcohol use.  Patient states he drank alcohol earlier this week for his birthday.  Patient was concerned about pancreatitis.  Patient blood work within normal limits.  Patient's chest x-ray negative.  Patient okay to be discharged home and instructed to follow-up with PCP if symptoms continue.  Patient likely with gastritis versus chest wall pain.    HEART score for chest pain  History- (Highly suspicious 2pt, Mod 1pt, slightly 0pt)        0  ECG- (significant ST deviation 2pt, Non  spec 1pt, nl 0pt)  0  AGE- (>65 2pt, 45-64 1 pt, Under 45 0 pt)    2  Risk Factors- ( DM,HTN,Chol, fhx CAD, BMI>30, hx CAD)  ( >3 or hx CAD 2pt, 1-2 risks 1pt, None 0pt)  0  Troponin- ( 3 times nl 2pt, 2 times nl 1pt, nl 0pt   0         TOTAL  2    SCORE 0-3: 2.5% MACE over next 6 weeks consider D/C outpatient F/U  SCORE 4-6: 20.3% MACE over next 6 weeks consider admit  SCORE 7-10:72.7% MACE over next 6 weeks consider early invasive strategy.    Patient has a HEART score of 2, plan will be discharge.       Problems Addressed:  Abdominal pain, epigastric: acute illness or injury  Chest pain of uncertain etiology: acute illness or injury    Amount and/or Complexity of Data Reviewed  Labs: ordered. Decision-making details documented in ED Course.  Radiology: ordered and independent interpretation performed. Decision-making details documented in ED Course.     Details: Chest x-ray inter by myself shows no acute cardiopulmonary issues.        Condition upon leaving the department: Stable    Disposition and Plan     Clinical Impression:  1. Abdominal pain, epigastric    2. Chest pain of uncertain etiology        Disposition:  Discharge    Follow-up:  Alejandro Humphries MD  [address] [place] [address] [id number] [phone]    Schedule an appointment as soon as possible for a visit  If symptoms worsen      Medications Prescribed:  Current Discharge Medication List

## 2024-04-14 LAB
ATRIAL RATE: 80 BPM
P AXIS: 32 DEGREES
P-R INTERVAL: 148 MS
Q-T INTERVAL: 380 MS
QRS DURATION: 90 MS
QTC CALCULATION (BEZET): 438 MS
R AXIS: -41 DEGREES
T AXIS: 10 DEGREES
VENTRICULAR RATE: 80 BPM

## 2024-04-23 ENCOUNTER — OFFICE VISIT (OUTPATIENT)
Dept: INTERNAL MEDICINE CLINIC | Facility: CLINIC | Age: 64
End: 2024-04-23
Payer: MEDICARE

## 2024-04-23 ENCOUNTER — LAB ENCOUNTER (OUTPATIENT)
Dept: LAB | Age: 64
End: 2024-04-23
Attending: INTERNAL MEDICINE
Payer: MEDICARE

## 2024-04-23 VITALS
HEART RATE: 67 BPM | BODY MASS INDEX: 31.02 KG/M2 | HEIGHT: 72 IN | OXYGEN SATURATION: 97 % | SYSTOLIC BLOOD PRESSURE: 134 MMHG | WEIGHT: 229 LBS | DIASTOLIC BLOOD PRESSURE: 76 MMHG

## 2024-04-23 DIAGNOSIS — D12.6 ADENOMATOUS POLYP OF COLON, UNSPECIFIED PART OF COLON: ICD-10-CM

## 2024-04-23 DIAGNOSIS — Z12.5 PROSTATE CANCER SCREENING: ICD-10-CM

## 2024-04-23 DIAGNOSIS — K76.0 HEPATIC STEATOSIS: ICD-10-CM

## 2024-04-23 DIAGNOSIS — Z00.00 ANNUAL PHYSICAL EXAM: ICD-10-CM

## 2024-04-23 DIAGNOSIS — Z00.00 ENCOUNTER FOR ANNUAL HEALTH EXAMINATION: ICD-10-CM

## 2024-04-23 DIAGNOSIS — I10 ESSENTIAL HYPERTENSION: ICD-10-CM

## 2024-04-23 DIAGNOSIS — E78.5 DYSLIPIDEMIA: ICD-10-CM

## 2024-04-23 DIAGNOSIS — I70.0 AORTIC ATHEROSCLEROSIS (HCC): ICD-10-CM

## 2024-04-23 DIAGNOSIS — Z00.00 ANNUAL PHYSICAL EXAM: Primary | ICD-10-CM

## 2024-04-23 PROBLEM — K85.20: Status: RESOLVED | Noted: 2020-11-01 | Resolved: 2024-04-23

## 2024-04-23 PROBLEM — Z87.19 HISTORY OF ACUTE PANCREATITIS: Status: ACTIVE | Noted: 2020-11-01

## 2024-04-23 LAB
ALBUMIN SERPL-MCNC: 4.7 G/DL (ref 3.2–4.8)
ALBUMIN/GLOB SERPL: 1.5 {RATIO} (ref 1–2)
ALP LIVER SERPL-CCNC: 86 U/L
ALT SERPL-CCNC: 68 U/L
ANION GAP SERPL CALC-SCNC: 8 MMOL/L (ref 0–18)
AST SERPL-CCNC: 49 U/L (ref ?–34)
BILIRUB SERPL-MCNC: 0.9 MG/DL (ref 0.2–1.1)
BUN BLD-MCNC: 12 MG/DL (ref 9–23)
BUN/CREAT SERPL: 14.5 (ref 10–20)
CALCIUM BLD-MCNC: 9.6 MG/DL (ref 8.7–10.4)
CHLORIDE SERPL-SCNC: 103 MMOL/L (ref 98–112)
CHOLEST SERPL-MCNC: 181 MG/DL (ref ?–200)
CO2 SERPL-SCNC: 25 MMOL/L (ref 21–32)
COMPLEXED PSA SERPL-MCNC: 0.4 NG/ML (ref ?–4)
CREAT BLD-MCNC: 0.83 MG/DL
EGFRCR SERPLBLD CKD-EPI 2021: 97 ML/MIN/1.73M2 (ref 60–?)
EST. AVERAGE GLUCOSE BLD GHB EST-MCNC: 111 MG/DL (ref 68–126)
FASTING PATIENT LIPID ANSWER: YES
FASTING STATUS PATIENT QL REPORTED: YES
GLOBULIN PLAS-MCNC: 3.1 G/DL (ref 2.8–4.4)
GLUCOSE BLD-MCNC: 111 MG/DL (ref 70–99)
HBA1C MFR BLD: 5.5 % (ref ?–5.7)
HDLC SERPL-MCNC: 42 MG/DL (ref 40–59)
LDLC SERPL CALC-MCNC: 113 MG/DL (ref ?–100)
NONHDLC SERPL-MCNC: 139 MG/DL (ref ?–130)
OSMOLALITY SERPL CALC.SUM OF ELEC: 282 MOSM/KG (ref 275–295)
POTASSIUM SERPL-SCNC: 4.2 MMOL/L (ref 3.5–5.1)
PROT SERPL-MCNC: 7.8 G/DL (ref 5.7–8.2)
SODIUM SERPL-SCNC: 136 MMOL/L (ref 136–145)
TRIGL SERPL-MCNC: 149 MG/DL (ref 30–149)
VLDLC SERPL CALC-MCNC: 26 MG/DL (ref 0–30)

## 2024-04-23 PROCEDURE — 83036 HEMOGLOBIN GLYCOSYLATED A1C: CPT | Performed by: INTERNAL MEDICINE

## 2024-04-23 PROCEDURE — 80053 COMPREHEN METABOLIC PANEL: CPT | Performed by: INTERNAL MEDICINE

## 2024-04-23 PROCEDURE — 80061 LIPID PANEL: CPT | Performed by: INTERNAL MEDICINE

## 2024-04-23 PROCEDURE — 36415 COLL VENOUS BLD VENIPUNCTURE: CPT | Performed by: INTERNAL MEDICINE

## 2024-04-23 RX ORDER — LISINOPRIL AND HYDROCHLOROTHIAZIDE 20; 12.5 MG/1; MG/1
1 TABLET ORAL DAILY
Qty: 90 TABLET | Refills: 1 | Status: SHIPPED | OUTPATIENT
Start: 2024-04-23

## 2024-04-23 NOTE — PROGRESS NOTES
Subjective:   Alejandro Lezama is a 64 year old male who presents this morning for a Medicare Initial Preventative Physical Exam (Welcome to Medicare- < 12 months on Medicare) and scheduled follow up of multiple significant but stable problems including hypertension hypercholesterolemia and hepatic steatosis.    This is his first Medicare physical.  His last visit here was for a physical January 2023.  Labs ordered at that time were never done.  Recent ER visit for epigastric and left-sided postprandial chest pain improved with Prilosec.  Also, he recently had a sore throat, now resolved.  Currently he feels well.  No particular issues for discussion today.    In terms of his hypertension, BP checks at home typically 140/90.  Diet general.  He walks regularly.  Weight down 7 pounds since physical January 2023.  He does drink 7-8 beers 2 days a week, and has had a prior history of alcoholic pancreatitis.    He has received 2 doses of Shingrix vaccine since his last visit.  Follow-up colonoscopy scheduled in June with Dr. Whitley.  He continues to decline statin treatment of hypercholesterolemia.    History/Other:   Fall Risk Assessment:   He has been screened for Falls and is low risk.      Cognitive Assessment:   He had a completely normal cognitive assessment - see flowsheet entries     Functional Ability/Status:   Alejandro Lezama has some abnormal functions as listed below:  He has Vision problems based on screening of functional status.       Depression Screening (PHQ-2/PHQ-9): PHQ-2 SCORE: 0  , done 4/23/2024        Advanced Directives:   He does NOT have a Living Will. [Do you have a living will?: No]  He does NOT have a Power of  for Health Care. [Do you have a healthcare power of ?: No]  Not discussed      Patient Active Problem List   Diagnosis    Duodenal ulcer with hemorrhage    GERD (gastroesophageal reflux disease)    Colon polyps    Acute diverticulitis     Dyslipidemia    Essential hypertension    Hepatic steatosis    Alcohol-induced acute pancreatitis (HCC)    Alcohol-induced acute pancreatitis, unspecified complication status (HCC)    Elevated LFTs    Generalized abdominal pain    Aortic atherosclerosis (HCC)    Fever    Fever, unspecified fever cause    Hepatitis    Acute cholecystitis    Gastrointestinal hemorrhage    History of acute pancreatitis     Allergies:  He is allergic to amoxicillin and radiology contrast iodinated dyes.    Current Medications:  Outpatient Medications Marked as Taking for the 4/23/24 encounter (Office Visit) with Alejandro Humphries MD   Medication Sig    lisinopril-hydroCHLOROthiazide 20-12.5 MG Oral Tab Take 1 tablet by mouth daily.    PEG 3350-KCl-Na Bicarb-NaCl (TRILYTE) 420 g Oral Recon Soln Take prep as directed by gastro office. May substitute with Trilyte/generic equivalent if needed.    Multiple Vitamin (MULTI VITAMIN MENS) Oral Tab Take 1 tablet by mouth daily.    omega-3 fatty acids 1000 MG Oral Cap Take 1,000 mg by mouth daily. Take 3 pills daily       Medical History:  He  has a past medical history of Acute cholecystitis (12/2020), Acute diverticulitis (01/2014), Aortic atherosclerosis (HCC), Colon polyps, Duodenal ulcer with hemorrhage (05/2010), Dyslipidemia, Essential hypertension, Gastrointestinal hemorrhage (01/2019), GERD (gastroesophageal reflux disease), Hepatic steatosis, History of acute pancreatitis (11/2020), Lyme disease (07/2010), and Visual impairment.  Surgical History:  He  has a past surgical history that includes colonoscopy (2010); colonoscopy (N/A, 03/05/2018); colonoscopy (N/A, 01/23/2019); ir cholecystostomy (12/22/2020); and laparoscopic cholecystectomy (01/2021).   Family History:  His family history includes Breast Cancer in his paternal grandmother; Cancer in his paternal grandmother; Colon Cancer in his father; Diabetes in his father; Heart Disease in his father; Hypertension in his father; Lung  Cancer in his father; Parkinson's in his maternal aunt and mother.  Social History:  He  reports that he has never smoked. He has never used smokeless tobacco. He reports current alcohol use of about 8.0 standard drinks of alcohol per week. He reports that he does not use drugs.    Tobacco:  He has never smoked tobacco.    CAGE Alcohol Screen:   He has been screened for alcohol abuse and his score is not 0:  Cut: Have you ever felt you should Cut down on your drinking?: Yes  Annoyed: Have people Annoyed you by criticizing your drinking?: No  Guilty: Have you ever felt bad or Guilty about your drinking?: No  Eye Opener: Have you ever had a drink first thing in the morning to steady your nerves or to get rid of a hangover (Eye opener)?: No  Total Score: 1      Patient Care Team:  Alejandro Humphries MD as PCP - General (Internal Medicine)    Review of Systems    REVIEW OF SYSTEMS:   GENERAL: No fever  LUNGS: No cough wheezing or shortness of breath  CARDIAC: No lightheadedness palpitations or activity related chest pain  GI: Frequent heartburn with postprandial epigastric and left-sided chest pain currently improved with Prilosec.  No anorexia dysphagia nausea vomiting diarrhea constipation or rectal bleeding  : No urinary frequency dysuria or hematuria  MUSCULOSKELETAL: No leg swelling  NEURO: No headaches     Objective:   Physical Exam    EXAM:   GENERAL: Pleasant male appearing well in no distress  /76   Pulse 67   Ht 6' (1.829 m)   Wt 229 lb (103.9 kg)   SpO2 97%   BMI 31.06 kg/m²   HEENT: Anicteric, conjunctiva pink, oropharynx normal  NECK: Supple without mass or thyromegaly  NODES: No peripheral adenopathy  LUNGS: Resonant to percussion and clear to auscultation  CARDIAC: Rhythm regular S1 S2 normal without murmur or edema  ABDOMEN: Bowel sounds normal soft nontender without mass or hepatosplenomegaly  EXTREMITIES: Normal without cyanosis or clubbing  PULSES: 2+ bilateral dorsalis pedis and posterior  tibial  NEURO: Reflexes 1-2+ bilaterally and symmetric     /76   Pulse 67   Ht 6' (1.829 m)   Wt 229 lb (103.9 kg)   SpO2 97%   BMI 31.06 kg/m²  Estimated body mass index is 31.06 kg/m² as calculated from the following:    Height as of this encounter: 6' (1.829 m).    Weight as of this encounter: 229 lb (103.9 kg).    Medicare Hearing Assessment:   Hearing Screening    Screening Method: Questionnaire  I have a problem hearing over the telephone: No I have trouble following the conversations when two or more people are talking at the same time: No   I have trouble understanding things on the TV: No I have to strain to understand conversations: No   I have to worry about missing the telephone ring or doorbell: No I have trouble hearing conversations in a noisy background such as a crowded room or restaurant: No   I get confused about where sounds come from: No I misunderstand some words in a sentence and need to ask people to repeat themselves: No   I especially have trouble understanding the speech of women and children: No I have trouble understanding the speaker in a large room such as at a meeting or place of Voodoo: No   Many people I talk to seem to mumble (or don't speak clearly): No People get annoyed because I misunderstand what they say: No   I misunderstand what others are saying and make inappropriate responses: No I avoid social activities because I cannot hear well and fear I will reply improperly: No   Family members and friends have told me they think I may have hearing loss: No                   Assessment & Plan:   Alejandro Lezama is a 64 year old male who presents for a Medicare Assessment.     1. Annual physical exam (Primary)  Prevnar 20 vaccine today  Check CMP glycohemoglobin lipid profile and screening PSA today.  Order sent  Continue current medication.  Prescription refill for lisinopril/HCTZ sent to pharmacy  Discussed and recommended healthy diet and regular  exercise  Also discussed and recommended decreasing alcohol intake  Annual Medicare physical  Return visit in 6 months for blood pressure check  -     Comp Metabolic Panel (14)  -     Hemoglobin A1C  -     Lipid Panel  -     PSA Total, Screen; Future; Expected date: 04/23/2024    2. Essential hypertension  Well-controlled.  Continue lisinopril/HCTZ    3. Dyslipidemia  Await labs.  Declining statin  -     Lipid Panel    4.  History of adenomatous polyp of colon, unspecified part of colon  Colonoscopy scheduled in June 5. Hepatic steatosis  Advise regarding diet, exercise and moderating alcohol intake as above  Overview:  Ultrasound 1-19    Orders:  -     Comp Metabolic Panel (14)    6. Aortic atherosclerosis (HCC)  Asymptomatic.  Risk factor control though he declines statin    7. Prostate cancer screening  Await PSA  -     PSA Total, Screen; Future; Expected date: 04/23/2024    Other orders  -     PCV20 VACCINE FOR INTRAMUSCULAR USE  -     Lisinopril-hydroCHLOROthiazide; Take 1 tablet by mouth daily.  Dispense: 90 tablet; Refill: 1    The patient indicates understanding of these issues and agrees to the plan.  Reinforced healthy diet, lifestyle, and exercise.      No follow-ups on file.     Alejandro Humphries MD, 4/23/2024     Supplementary Documentation:   General Health:  In the past six months, have you lost more than 10 pounds without trying?: 2 - No  Has your appetite been poor?: No  Type of Diet: Balanced  How does the patient maintain a good energy level?: Appropriate Exercise  How would you describe your daily physical activity?: Moderate  How would you describe your current health state?: Good  How do you maintain positive mental well-being?: Social Interaction;Games;Visiting Friends  On a scale of 0 to 10, with 0 being no pain and 10 being severe pain, what is your pain level?: 5 - (Moderate)  In the past six months, have you experienced urine leakage?: 0-No  At any time do you feel concerned for the  safety/well-being of yourself and/or your children, in your home or elsewhere?: No  Have you had any immunizations at another office such as Influenza, Hepatitis B, Tetanus, or Pneumococcal?: Yes        Alejandro Lezama's SCREENING SCHEDULE   Tests on this list are recommended by your physician but may not be covered, or covered at this frequency, by your insurer.   Please check with your insurance carrier before scheduling to verify coverage.   PREVENTATIVE SERVICES FREQUENCY &  COVERAGE DETAILS LAST COMPLETION DATE   Diabetes Screening    Fasting Blood Sugar / Glucose    One screening every 12 months if never tested or if previously tested but not diagnosed with pre-diabetes   One screening every 6 months if diagnosed with pre-diabetes Lab Results   Component Value Date     (H) 04/13/2024        Cardiovascular Disease Screening    Lipid Panel  Cholesterol  Lipoprotein (HDL)  Triglycerides Covered every 5 years for all Medicare beneficiaries without apparent signs or symptoms of cardiovascular disease Lab Results   Component Value Date    CHOLEST 205 (H) 10/20/2021    HDL 36 (L) 10/20/2021     (H) 10/20/2021    TRIG 99 10/20/2021         Electrocardiogram (EKG)   Covered if needed at Welcome to Medicare, and non-screening if indicated for medical reasons 04/14/2024      Ultrasound Screening for Abdominal Aortic Aneurysm (AAA) Covered once in a lifetime for one of the following risk factors    Men who are 65-75 years old and have ever smoked    Anyone with a family history -     Colorectal Cancer Screening  Covered for ages 50-85; only need ONE of the following:    Colonoscopy   Covered every 10 years    Covered every 2 years if patient is at high risk or previous colonoscopy was abnormal 01/23/2019    Health Maintenance   Topic Date Due    Colorectal Cancer Screening  01/23/2024       Flexible Sigmoidoscopy   Covered every 4 years -    Fecal Occult Blood Test Covered annually -   Prostate  Cancer Screening    Prostate-Specific Antigen (PSA) Annually Lab Results   Component Value Date    PSA 0.7 10/31/2017     Health Maintenance   Topic Date Due    PSA  10/20/2023      Immunizations    Influenza Covered once per flu season  Please get every year 11/01/2023  No recommendations at this time    Pneumococcal Each vaccine ([id number] & [id number]) covered once after 65 Prevnar 13: -    [id number]: -     Pneumococcal Vaccination(1 of 1 - PCV) Never done    Hepatitis B One screening covered for patients with certain risk factors   -  No recommendations at this time    Tetanus Toxoid Not covered by Medicare Part B unless medically necessary (cut with metal); may be covered with your pharmacy prescription benefits -    Tetanus, Diptheria and Pertusis TD and TDaP Not covered by Medicare Part B -  No recommendations at this time    Zoster Not covered by Medicare Part B; may be covered with your pharmacy  prescription benefits -  Zoster Vaccines(1 of 2) Never done     Annual Monitoring of Persistent Medications (ACE/ARB, digoxin diuretics, anticonvulsants)    Potassium Annually Lab Results   Component Value Date    K 4.3 04/13/2024         Creatinine   Annually Lab Results   Component Value Date    CREATSERUM 0.93 04/13/2024         BUN Annually Lab Results   Component Value Date    BUN 11 04/13/2024       Drug Serum Conc Annually No results found for: \"DIGOXIN\", \"DIG\", \"VALP\"

## 2024-04-23 NOTE — PATIENT INSTRUCTIONS
Your blood pressure today was good at 134/76  You received a Prevnar 20 vaccine today  Await results of today's blood tests  Continue current medication  Please try to follow a healthy diet and exercise regularly, and decrease alcohol use  Return visit in 6 months for a blood pressure check  Alejandro Lezama's SCREENING SCHEDULE   Tests on this list are recommended by your physician but may not be covered, or covered at this frequency, by your insurer.   Please check with your insurance carrier before scheduling to verify coverage.   PREVENTATIVE SERVICES FREQUENCY &  COVERAGE DETAILS LAST COMPLETION DATE   Diabetes Screening    Fasting Blood Sugar / Glucose    One screening every 12 months if never tested or if previously tested but not diagnosed with pre-diabetes   One screening every 6 months if diagnosed with pre-diabetes Lab Results   Component Value Date     (H) 04/13/2024        Cardiovascular Disease Screening    Lipid Panel  Cholesterol  Lipoprotein (HDL)  Triglycerides Covered every 5 years for all Medicare beneficiaries without apparent signs or symptoms of cardiovascular disease Lab Results   Component Value Date    CHOLEST 205 (H) 10/20/2021    HDL 36 (L) 10/20/2021     (H) 10/20/2021    TRIG 99 10/20/2021         Electrocardiogram (EKG)   Covered if needed at Welcome to Medicare, and non-screening if indicated for medical reasons 04/14/2024      Ultrasound Screening for Abdominal Aortic Aneurysm (AAA) Covered once in a lifetime for one of the following risk factors   • Men who are 65-75 years old and have ever smoked   • Anyone with a family history -     Colorectal Cancer Screening  Covered for ages 50-85; only need ONE of the following:    Colonoscopy   Covered every 10 years    Covered every 2 years if patient is at high risk or previous colonoscopy was abnormal 01/23/2019    Health Maintenance   Topic Date Due   • Colorectal Cancer Screening  01/23/2024       Flexible  Sigmoidoscopy   Covered every 4 years -    Fecal Occult Blood Test Covered annually -   Prostate Cancer Screening    Prostate-Specific Antigen (PSA) Annually Lab Results   Component Value Date    PSA 0.7 10/31/2017     Health Maintenance   Topic Date Due   • PSA  10/20/2023      Immunizations    Influenza Covered once per flu season  Please get every year 11/01/2023  No recommendations at this time    Pneumococcal Each vaccine ([id number] & [id number]) covered once after 65 Prevnar 13: -    [id number]: -     Pneumococcal Vaccination(1 of 1 - PCV) Never done    Hepatitis B One screening covered for patients with certain risk factors   -  No recommendations at this time    Tetanus Toxoid Not covered by Medicare Part B unless medically necessary (cut with metal); may be covered with your pharmacy prescription benefits -    Tetanus, Diptheria and Pertusis TD and TDaP Not covered by Medicare Part B -  No recommendations at this time    Zoster Not covered by Medicare Part B; may be covered with your pharmacy  prescription benefits -  Zoster Vaccines(1 of 2) Never done     Annual Monitoring of Persistent Medications (ACE/ARB, digoxin diuretics, anticonvulsants)    Potassium Annually Lab Results   Component Value Date    K 4.3 04/13/2024         Creatinine   Annually Lab Results   Component Value Date    CREATSERUM 0.93 04/13/2024         BUN Annually Lab Results   Component Value Date    BUN 11 04/13/2024       Drug Serum Conc Annually No results found for: \"DIGOXIN\", \"DIG\", \"VALP\"

## 2024-05-22 RX ORDER — LISINOPRIL AND HYDROCHLOROTHIAZIDE 20; 12.5 MG/1; MG/1
1 TABLET ORAL DAILY
Qty: 90 TABLET | Refills: 1 | OUTPATIENT
Start: 2024-05-22

## 2024-05-29 ENCOUNTER — OFFICE VISIT (OUTPATIENT)
Dept: INTERNAL MEDICINE CLINIC | Facility: CLINIC | Age: 64
End: 2024-05-29

## 2024-05-29 VITALS
DIASTOLIC BLOOD PRESSURE: 76 MMHG | BODY MASS INDEX: 31.22 KG/M2 | HEART RATE: 76 BPM | SYSTOLIC BLOOD PRESSURE: 136 MMHG | OXYGEN SATURATION: 97 % | HEIGHT: 72 IN | WEIGHT: 230.5 LBS

## 2024-05-29 DIAGNOSIS — M54.9 UPPER BACK PAIN ON RIGHT SIDE: Primary | ICD-10-CM

## 2024-05-29 DIAGNOSIS — I10 ESSENTIAL HYPERTENSION: ICD-10-CM

## 2024-05-29 PROCEDURE — 99214 OFFICE O/P EST MOD 30 MIN: CPT | Performed by: INTERNAL MEDICINE

## 2024-05-29 PROCEDURE — G2211 COMPLEX E/M VISIT ADD ON: HCPCS | Performed by: INTERNAL MEDICINE

## 2024-05-29 RX ORDER — CYCLOBENZAPRINE HCL 10 MG
10 TABLET ORAL 3 TIMES DAILY PRN
Qty: 30 TABLET | Refills: 0 | Status: SHIPPED | OUTPATIENT
Start: 2024-05-29 | End: 2024-06-18

## 2024-05-29 RX ORDER — LISINOPRIL AND HYDROCHLOROTHIAZIDE 20; 12.5 MG/1; MG/1
1 TABLET ORAL DAILY
Qty: 90 TABLET | Refills: 1 | Status: SHIPPED | OUTPATIENT
Start: 2024-05-29

## 2024-05-29 NOTE — PROGRESS NOTES
Alejandro Lezama is a 64 year old male.   Chief Complaint   Patient presents with    Flank Pain    Back Pain     HPI:   Since last Tuesday, 8 days ago, he has had persistent intermittent pain just below his right shoulder blade.  Pain is occasionally sharp, and is typically precipitated by movement such as twisting turning and bending.  He also has some pain with deep breathing.  Pain does not radiate and he has no arm pain numbness tingling or weakness.  No fever.  No cough or shortness of breath.  He recalls no recent injury, but says the pain began shortly after golfing and after riding his lawn tractor.  He has been applying heat at home with some temporary relief.  He has not taken any medications as he avoids NSAIDs because of prior history of GI hemorrhage.    Medications reviewed, as listed below.  He states that his pharmacy did not fill his recent prescription for lisinopril/HCTZ.  Will again send a refill.  Current Outpatient Medications   Medication Sig Dispense Refill    lisinopril-hydroCHLOROthiazide 20-12.5 MG Oral Tab Take 1 tablet by mouth daily. 90 tablet 1    PEG 3350-KCl-Na Bicarb-NaCl (TRILYTE) 420 g Oral Recon Soln Take prep as directed by gastro office. May substitute with Trilyte/generic equivalent if needed. 1 each 0    Multiple Vitamin (MULTI VITAMIN MENS) Oral Tab Take 1 tablet by mouth daily.      omega-3 fatty acids 1000 MG Oral Cap Take 1,000 mg by mouth daily. Take 3 pills daily       Allergies   Allergen Reactions    Amoxicillin HIVES    Radiology Contrast Iodinated Dyes HIVES and JITTERY      Past Medical History:    Acute cholecystitis    Acute cholecystitis with E. coli bacteremia Rx cholecystostomy tube; s/p laparoscopic cholecystectomy 1-21    Acute diverticulitis    Aortic atherosclerosis (HCC)    CXR 11-20    Colon polyps    Duodenal ulcer with hemorrhage    EGD with H. pylori negative    Dyslipidemia    Essential hypertension    Gastrointestinal hemorrhage     Melena; EGD with small hiatal hernia, gastric polyps, mild gastritis, biopsies benign, H. pylori negative; colonoscopy with diverticulosis and internal hemorrhoids    GERD (gastroesophageal reflux disease)    Hepatic steatosis    Ultrasound 1-19    History of acute pancreatitis    Secondary to alcohol    Lyme disease    Erythema migrans, treated    Visual impairment    glasses     Past Surgical History:   Procedure Laterality Date    Colonoscopy  2010    4th colon; Dr. Whitley    Colonoscopy N/A 03/05/2018    Procedure: COLONOSCOPY;  Surgeon: Brian Whitley MD;  Location: [facility] ENDOSCOPY    Colonoscopy N/A 01/23/2019    Procedure: COLONOSCOPY;  Surgeon: Brian Whitley MD;  Location: [facility] ENDOSCOPY    Ir cholecystostomy  12/22/2020    Laparoscopic cholecystectomy  01/2021      Social History:  Social History     Socioeconomic History    Marital status: Single   Occupational History    Occupation:    Tobacco Use    Smoking status: Never    Smokeless tobacco: Never   Vaping Use    Vaping status: Never Used   Substance and Sexual Activity    Alcohol use: Yes     Alcohol/week: 8.0 standard drinks of alcohol     Types: 4 Cans of beer, 4 Shots of liquor per week     Comment: 7-8 beers 2 days weekly    Drug use: No    Sexual activity: Not Currently     Partners: Female   Other Topics Concern    Caffeine Concern Yes     Comment: coffee, 2cups/day        EXAM:   GENERAL: Pleasant male appearing well in no distress  /76   Pulse 76   Ht 6' (1.829 m)   Wt 230 lb 8 oz (104.6 kg)   SpO2 97%   BMI 31.26 kg/m²   LUNGS: Resonant to percussion and clear to auscultation without crackles or wheezes  BACK: No soft tissue swelling, ecchymosis or rash.  No right upper back tenderness or palpable abnormality  CARDIAC: Rhythm regular S1 S2 normal without murmur  NEURO: Reflexes 1-2+ bilateral biceps bilateral triceps and bilateral brachioradialis.  Strength 5/5 bilateral upper extremities without  weakness      ASSESSMENT AND PLAN:   1. Upper back pain on right side  Likely muscular strain  Recommend rest with gentle stretching exercises and heat application 2-3 times daily  Cyclobenzaprine 10 mg 3 times daily as needed.  Warned about possible drowsiness.  Prescription sent to pharmacy  Call if no better    2. Essential hypertension  Well-controlled.  Prescription refill for lisinopril/HCTZ again sent to pharmacy      The patient indicates understanding of these issues and agrees to the plan.  The patient is asked to return in 6 months.    Alejandro Humphries MD  5/29/2024  12:11 PM

## 2024-05-29 NOTE — PATIENT INSTRUCTIONS
Please avoid painful activities but perform gentle stretching exercises regularly  Apply heat 2-3 times daily  Take cyclobenzaprine 10 mg 3 times daily as needed, watching for drowsiness  Call or return if not soon better

## 2024-06-20 ENCOUNTER — ANESTHESIA (OUTPATIENT)
Dept: ENDOSCOPY | Facility: HOSPITAL | Age: 64
End: 2024-06-20

## 2024-06-20 ENCOUNTER — TELEPHONE (OUTPATIENT)
Facility: CLINIC | Age: 64
End: 2024-06-20

## 2024-06-20 ENCOUNTER — HOSPITAL ENCOUNTER (OUTPATIENT)
Facility: HOSPITAL | Age: 64
Setting detail: HOSPITAL OUTPATIENT SURGERY
Discharge: HOME | End: 2024-06-20
Attending: INTERNAL MEDICINE | Admitting: INTERNAL MEDICINE

## 2024-06-20 ENCOUNTER — ANESTHESIA EVENT (OUTPATIENT)
Dept: ENDOSCOPY | Facility: HOSPITAL | Age: 64
End: 2024-06-20

## 2024-06-20 VITALS
BODY MASS INDEX: 31.15 KG/M2 | HEART RATE: 68 BPM | OXYGEN SATURATION: 96 % | RESPIRATION RATE: 19 BRPM | SYSTOLIC BLOOD PRESSURE: 125 MMHG | WEIGHT: 230 LBS | HEIGHT: 72 IN | TEMPERATURE: 98 F | DIASTOLIC BLOOD PRESSURE: 80 MMHG

## 2024-06-20 DIAGNOSIS — Z86.010 HISTORY OF COLON POLYPS: ICD-10-CM

## 2024-06-20 DIAGNOSIS — Z12.11 COLON CANCER SCREENING: ICD-10-CM

## 2024-06-20 PROCEDURE — 0DBN8ZX EXCISION OF SIGMOID COLON, VIA NATURAL OR ARTIFICIAL OPENING ENDOSCOPIC, DIAGNOSTIC: ICD-10-PCS | Performed by: INTERNAL MEDICINE

## 2024-06-20 PROCEDURE — 0DBM8ZX EXCISION OF DESCENDING COLON, VIA NATURAL OR ARTIFICIAL OPENING ENDOSCOPIC, DIAGNOSTIC: ICD-10-PCS | Performed by: INTERNAL MEDICINE

## 2024-06-20 PROCEDURE — 0DBP8ZX EXCISION OF RECTUM, VIA NATURAL OR ARTIFICIAL OPENING ENDOSCOPIC, DIAGNOSTIC: ICD-10-PCS | Performed by: INTERNAL MEDICINE

## 2024-06-20 PROCEDURE — 45380 COLONOSCOPY AND BIOPSY: CPT | Performed by: INTERNAL MEDICINE

## 2024-06-20 RX ORDER — LIDOCAINE HYDROCHLORIDE 10 MG/ML
INJECTION, SOLUTION EPIDURAL; INFILTRATION; INTRACAUDAL; PERINEURAL AS NEEDED
Status: DISCONTINUED | OUTPATIENT
Start: 2024-06-20 | End: 2024-06-20 | Stop reason: SURG

## 2024-06-20 RX ORDER — HYDROCORTISONE 25 MG/G
1 CREAM TOPICAL 2 TIMES DAILY
Qty: 28 G | Refills: 0 | Status: SHIPPED | OUTPATIENT
Start: 2024-06-20

## 2024-06-20 RX ORDER — SODIUM CHLORIDE, SODIUM LACTATE, POTASSIUM CHLORIDE, CALCIUM CHLORIDE 600; 310; 30; 20 MG/100ML; MG/100ML; MG/100ML; MG/100ML
INJECTION, SOLUTION INTRAVENOUS CONTINUOUS
Status: DISCONTINUED | OUTPATIENT
Start: 2024-06-20 | End: 2024-06-20

## 2024-06-20 RX ORDER — NALOXONE HYDROCHLORIDE 0.4 MG/ML
0.08 INJECTION, SOLUTION INTRAMUSCULAR; INTRAVENOUS; SUBCUTANEOUS ONCE AS NEEDED
Status: DISCONTINUED | OUTPATIENT
Start: 2024-06-20 | End: 2024-06-20

## 2024-06-20 RX ADMIN — SODIUM CHLORIDE, SODIUM LACTATE, POTASSIUM CHLORIDE, CALCIUM CHLORIDE: 600; 310; 30; 20 INJECTION, SOLUTION INTRAVENOUS at 10:26:00

## 2024-06-20 RX ADMIN — SODIUM CHLORIDE, SODIUM LACTATE, POTASSIUM CHLORIDE, CALCIUM CHLORIDE: 600; 310; 30; 20 INJECTION, SOLUTION INTRAVENOUS at 09:57:00

## 2024-06-20 RX ADMIN — LIDOCAINE HYDROCHLORIDE 50 MG: 10 INJECTION, SOLUTION EPIDURAL; INFILTRATION; INTRACAUDAL; PERINEURAL at 09:59:00

## 2024-06-20 NOTE — TELEPHONE ENCOUNTER
----- Message from Brian Whitley sent at 6/20/2024  3:38 PM CDT -----  I wanted to get back to you with your colonoscopy results.  You had 5 colon polyps removed which were benign.  I would advise a repeat colonoscopy in 5 years to make sure no new polyps are forming.      You also have internal hemorrhoids and diverticulosis.  Please stay on a high fiber diet and call with any questions.

## 2024-06-20 NOTE — H&P
History & Physical Examination    Patient Name: Alejandro Lezama  MRN: [id number]  CSN: [id number]  YOB: 1960    Diagnosis:     CRC screening  Hx colon polyps     Medications Prior to Admission   Medication Sig Dispense Refill Last Dose    lisinopril-hydroCHLOROthiazide 20-12.5 MG Oral Tab Take 1 tablet by mouth daily. 90 tablet 1 2024    Multiple Vitamin (MULTI VITAMIN MENS) Oral Tab Take 1 tablet by mouth daily.       omega-3 fatty acids 1000 MG Oral Cap Take 1,000 mg by mouth daily. Take 3 pills daily       [] cyclobenzaprine 10 MG Oral Tab Take 1 tablet (10 mg total) by mouth 3 (three) times daily as needed for Muscle spasms. (Patient not taking: Reported on 2024) 30 tablet 0 Not Taking    PEG 3350-KCl-Na Bicarb-NaCl (TRILYTE) 420 g Oral Recon Soln Take prep as directed by gastro office. May substitute with Trilyte/generic equivalent if needed. 1 each 0      Current Facility-Administered Medications   Medication Dose Route Frequency    lactated ringers infusion   Intravenous Continuous       Allergies:   Allergies   Allergen Reactions    Amoxicillin HIVES    Radiology Contrast Iodinated Dyes HIVES and JITTERY       Past Medical History:    Acute cholecystitis    Acute cholecystitis with E. coli bacteremia Rx cholecystostomy tube; s/p laparoscopic cholecystectomy 1-21    Acute diverticulitis    Aortic atherosclerosis (HCC)    CXR 11-20    Colon polyps    Duodenal ulcer with hemorrhage    EGD with H. pylori negative    Dyslipidemia    Essential hypertension    Gastrointestinal hemorrhage    Melena; EGD with small hiatal hernia, gastric polyps, mild gastritis, biopsies benign, H. pylori negative; colonoscopy with diverticulosis and internal hemorrhoids    GERD (gastroesophageal reflux disease)    Hepatic steatosis    Ultrasound 1-19    High blood pressure    History of acute pancreatitis    Secondary to alcohol    Lyme disease    Erythema migrans, treated    Visual  impairment    glasses     Past Surgical History:   Procedure Laterality Date    Colonoscopy  2010    4th colon; Dr. Whitley    Colonoscopy N/A 03/05/2018    Procedure: COLONOSCOPY;  Surgeon: Brian Whitley MD;  Location: [facility] ENDOSCOPY    Colonoscopy N/A 01/23/2019    Procedure: COLONOSCOPY;  Surgeon: Brian Whitley MD;  Location: [facility] ENDOSCOPY    Ir cholecystostomy  12/22/2020    Laparoscopic cholecystectomy  01/2021     Family History   Problem Relation Age of Onset    Diabetes Father     Hypertension Father     Heart Disease Father         Angina age 70s; CABG age 81    Colon Cancer Father     Other (Lung Cancer) Father     Other (Parkinson's) Mother     Other (Parkinson's) Maternal Aunt     Breast Cancer Paternal Grandmother     Cancer Paternal Grandmother         breast cancer     Social History     Tobacco Use    Smoking status: Never    Smokeless tobacco: Never   Substance Use Topics    Alcohol use: Yes     Alcohol/week: 8.0 standard drinks of alcohol     Types: 4 Cans of beer, 4 Shots of liquor per week     Comment: 7-8 beers 2 days weekly       SYSTEM Check if Review is Normal Check if Physical Exam is Normal If not normal, please explain:   HEENT [x ] [ x]    NECK & BACK [x ] [x ]    HEART [x ] [ x]    LUNGS [x ] [ x]    ABDOMEN [x ] [x ]    UROGENITAL [ ] [ ]    EXTREMITIES [x ] [x ]    OTHER        [ x ] I have discussed the risks and benefits and alternatives with the patient/family.  They understand and agree to proceed with plan of care.  [ x ] I have reviewed the History and Physical done within the last 30 days.  Any changes noted above.    Brian Whitley MD  6/20/2024  9:47 AM

## 2024-06-20 NOTE — OPERATIVE REPORT
[facility] Endoscopy Report  Date of procedure-June 20, 2024    Preoperative Diagnosis:  -Colorectal cancer screening  -History colon polyps      Postoperative Diagnosis:  -Colon polyps x 5  -Left-sided diverticular disease  -Internal hemorrhoids    Procedure:    Colonoscopy    Surgeon:  Brian Whitley M.D.    Anesthesia:  MAC     Technique:  After informed consent, the patient was placed in the left lateral recumbent position.  Digital rectal examination revealed no palpable intraluminal abnormalities.  An Olympus variable stiffness 190 series HD colonoscope was inserted into the rectum and advanced under direct vision by following the lumen to the cecum.  The colon was examined upon withdrawal in the let lateral position.  The procedure was well tolerated without immediate complication.      Findings:  The preparation of the colon was good.  The terminal ileum was examined for 4 cm and visually normal.  The ileocecal valve was well preserved. The visualized colonic mucosa from the cecum to the anal verge was normal with an intact vascular pattern.    Colon polyps x 5 removed as follows;  -Descending x 1, sessile 2 mm in size and cold forceps removed.  -Sigmoid x 1, diminutive removed by cold forceps technique.  -Rectum x 3, each polyp was diminutive removed by cold forceps technique.  All polypectomy sites inspected and found to be free bleeding specimens retrieved and sent for analysis.    Left-sided diverticular disease, complex wid[place] diverticula were noted in the sigmoid colon with scattered more rare diverticula noted in the descending, no diverticulitis.    Moderate sized internal hemorrhoids noted on retroflexed view.    Estimated blood loss-insignificant  Specimens-see above    Impression:  -Colon polyps x 5  -Left-sided diverticular disease  -Internal hemorrhoids    Recommendations:  - Post polypectomy instructions given  - Repeat colonoscopy in 3- 5 years  - High fiber diet for  diverticular disease  - Symptomatic treatment of hemorrhoids          Brian Whitley MD  6/20/2024  10:30 AM

## 2024-06-20 NOTE — ANESTHESIA PREPROCEDURE EVALUATION
Anesthesia PreOp Note    HPI:     Alejandro Lezama is a 64 year old male who presents for preoperative consultation requested by: Brian Whitley MD    Date of Surgery: 6/20/2024    Procedure(s):  COLONOSCOPY  Indication: Colon cancer screening /History of colon polyps    Relevant Problems   No relevant active problems       NPO:  Last Liquid Consumption Date: 06/20/24  Last Liquid Consumption Time: 0630  Last Solid Consumption Date: 06/18/24  Last Solid Consumption Time: 1900  Last Liquid Consumption Date: 06/20/24          History Review:  Patient Active Problem List    Diagnosis Date Noted    Fever 12/20/2020    Fever, unspecified fever cause 12/20/2020    Hepatitis 12/20/2020    Acute cholecystitis 12/2020    Aortic atherosclerosis (HCC)     Elevated LFTs     Generalized abdominal pain     Alcohol-induced acute pancreatitis (HCC) 11/05/2020    Alcohol-induced acute pancreatitis, unspecified complication status (HCC) 11/05/2020    History of acute pancreatitis 11/2020    Hepatic steatosis     Gastrointestinal hemorrhage 01/2019    Essential hypertension 11/30/2016    Duodenal ulcer with hemorrhage 07/27/2015    GERD (gastroesophageal reflux disease) 07/27/2015    Colon polyps 07/27/2015    Acute diverticulitis 07/27/2015    Dyslipidemia 07/27/2015       Past Medical History:    Acute cholecystitis    Acute cholecystitis with E. coli bacteremia Rx cholecystostomy tube; s/p laparoscopic cholecystectomy 1-21    Acute diverticulitis    Aortic atherosclerosis (HCC)    CXR 11-20    Colon polyps    Duodenal ulcer with hemorrhage    EGD with H. pylori negative    Dyslipidemia    Essential hypertension    Gastrointestinal hemorrhage    Melena; EGD with small hiatal hernia, gastric polyps, mild gastritis, biopsies benign, H. pylori negative; colonoscopy with diverticulosis and internal hemorrhoids    GERD (gastroesophageal reflux disease)    Hepatic steatosis    Ultrasound 1-19    High blood pressure     History of acute pancreatitis    Secondary to alcohol    Lyme disease    Erythema migrans, treated    Visual impairment    glasses       Past Surgical History:   Procedure Laterality Date    Colonoscopy  2010 colon; Dr. Whitley    Colonoscopy N/A 2018    Procedure: COLONOSCOPY;  Surgeon: Brian Whitley MD;  Location: [facility] ENDOSCOPY    Colonoscopy N/A 2019    Procedure: COLONOSCOPY;  Surgeon: Brian Whitley MD;  Location: [facility] ENDOSCOPY    Ir cholecystostomy  2020    Laparoscopic cholecystectomy  2021       Medications Prior to Admission   Medication Sig Dispense Refill Last Dose    lisinopril-hydroCHLOROthiazide 20-12.5 MG Oral Tab Take 1 tablet by mouth daily. 90 tablet 1 2024    Multiple Vitamin (MULTI VITAMIN MENS) Oral Tab Take 1 tablet by mouth daily.       omega-3 fatty acids 1000 MG Oral Cap Take 1,000 mg by mouth daily. Take 3 pills daily       [] cyclobenzaprine 10 MG Oral Tab Take 1 tablet (10 mg total) by mouth 3 (three) times daily as needed for Muscle spasms. (Patient not taking: Reported on 2024) 30 tablet 0 Not Taking    PEG 3350-KCl-Na Bicarb-NaCl (TRILYTE) 420 g Oral Recon Soln Take prep as directed by gastro office. May substitute with Trilyte/generic equivalent if needed. 1 each 0      Current Facility-Administered Medications Ordered in Epic   Medication Dose Route Frequency Provider Last Rate Last Admin    lactated ringers infusion   Intravenous Continuous Brian Whitley MD         No current [facility]-ordered outpatient medications on file.       Allergies   Allergen Reactions    Amoxicillin HIVES    Radiology Contrast Iodinated Dyes HIVES and JITTERY       Family History   Problem Relation Age of Onset    Diabetes Father     Hypertension Father     Heart Disease Father         Angina age 70s; CABG age 81    Colon Cancer Father     Other (Lung Cancer) Father     Other (Parkinson's) Mother     Other (Parkinson's) Maternal Aunt     Breast Cancer  Paternal Grandmother     Cancer Paternal Grandmother         breast cancer     Social History     Socioeconomic History    Marital status: Single   Occupational History    Occupation:    Tobacco Use    Smoking status: Never    Smokeless tobacco: Never   Vaping Use    Vaping status: Never Used   Substance and Sexual Activity    Alcohol use: Yes     Alcohol/week: 8.0 standard drinks of alcohol     Types: 4 Cans of beer, 4 Shots of liquor per week     Comment: 7-8 beers 2 days weekly    Drug use: No    Sexual activity: Not Currently     Partners: Female   Other Topics Concern    Caffeine Concern Yes     Comment: coffee, 2cups/day       Available pre-op labs reviewed.  Lab Results   Component Value Date    WBC 5.1 04/13/2024    RBC 5.40 04/13/2024    HGB 18.3 (H) 04/13/2024    HCT 48.8 04/13/2024    MCV 90.4 04/13/2024    MCH 33.9 04/13/2024    MCHC 37.5 (H) 04/13/2024    RDW 11.5 04/13/2024    .0 04/13/2024     Lab Results   Component Value Date     04/23/2024    K 4.2 04/23/2024     04/23/2024    CO2 25.0 04/23/2024    BUN 12 04/23/2024    CREATSERUM 0.83 04/23/2024     (H) 04/23/2024    CA 9.6 04/23/2024          Vital Signs:  Body mass index is 31.19 kg/m².   height is 1.829 m (6') and weight is 104.3 kg (230 lb). His blood pressure is 146/83 and his pulse is 64. His respiration is 20 and oxygen saturation is 95%.   Vitals:    06/11/24 1121 06/20/24 0911   BP:  146/83   Pulse:  64   Resp:  20   SpO2:  95%   Weight: 104.3 kg (230 lb)    Height: 1.829 m (6')         Anesthesia Evaluation     Patient summary reviewed and Nursing notes reviewed    Airway   Mallampati: III  TM distance: >3 FB  Neck ROM: full  Dental - Dentition appears grossly intact     Pulmonary - negative ROS and normal exam    breath sounds clear to auscultation  Cardiovascular - normal exam  (+) hypertension    Neuro/Psych - negative ROS     GI/Hepatic/Renal    (+) GERD    Endo/Other - negative ROS    Abdominal  - normal exam                 Anesthesia Plan:   ASA:  2  Plan:   MAC  Informed Consent Plan and Risks Discussed With:  Patient  Discussed plan with:  Surgeon      I have informed Alejandro Lezama and/or legal guardian or family member of the nature of the anesthetic plan, benefits, risks including possible dental damage if relevant, major complications, and any alternative forms of anesthetic management.   All of the patient's questions were answered to the best of my ability. The patient desires the anesthetic management as planned.  LACEY LEON CRNA  6/20/2024 9:46 AM  Present on Admission:  **None**

## 2024-06-20 NOTE — DISCHARGE INSTRUCTIONS
Home Care Instructions for Colonoscopy  with Sedation    Diet:  - Resume your regular diet as tolerated unless otherwise instructed.  - Start with light meals to minimize bloating.  - Do not drink alcohol today.    Medication:  - If you have questions about resuming your normal medications, please contact your Primary Care Physician.    Activities:  - Take it easy today. Do not return to work today.  - Do not drive today.  - Do not operate any machinery today (including kitchen equipment).    Colonoscopy:  - You may notice some rectal \"spotting\" (a little blood on the toilet tissue) for a day or two after the exam. This is normal.  - If you experience any rectal bleeding (not spotting), persistent tenderness or sharp severe abdominal pains, oral temperature over 100 degrees Fahrenheit, light-headedness or dizziness, or any other problems, contact your doctor.    **If unable to reach your doctor, please go to the [facility] Emergency Room**    - Your referring physician will receive a full report of your examination.  - If you do not hear from your doctor's office within two weeks of your biopsy, please call them for your results.    You may be able to see your laboratory results in [facility] between 4 and 7 business days.  In some cases, your physician may not have viewed the results before they are released to [facility].  If you have questions regarding your results contact the physician who ordered the test/exam by phone or via [facility] by choosing \"Ask a Medical Question.\"

## 2024-06-20 NOTE — ANESTHESIA POSTPROCEDURE EVALUATION
Patient: Alejandro Lezama    Procedure Summary       Date: 06/20/24 Room / Location: [facility] ENDOSCOPY 04 / [facility] ENDOSCOPY    Anesthesia Start: 0957 Anesthesia Stop:     Procedure: COLONOSCOPYn with polyp biopsy Diagnosis:       Colon cancer screening      History of colon polyps      (polyps, diverticulosis, hemorrhoids)    Surgeons: Brian Whitley MD Anesthesiologist: Linda Leon CRNA    Anesthesia Type: MAC ASA Status: 2            Anesthesia Type: MAC    Vitals Value Taken Time   /91 06/20/24 1030   Temp 98 °F (36.7 °C) 06/20/24 1030   Pulse 69 06/20/24 1030   Resp 16 06/20/24 1030   SpO2 97 % 06/20/24 1030       [facility] AN Post Evaluation:   Patient Evaluated in PACU  Patient Participation: complete - patient participated  Level of Consciousness: sleepy but conscious  Pain Score: 0  Pain Management: adequate  Airway Patency:patent  Dental exam unchanged from preop  Yes    Cardiovascular Status: stable and acceptable  Respiratory Status: acceptable and room air  Postoperative Hydration acceptable      LINDA LEON CRNA  6/20/2024 10:30 AM

## 2024-06-20 NOTE — TELEPHONE ENCOUNTER
Recall colonoscopy in 5 years per Dr Whitley.    Colon done 6/20/2024    Health maintenance updated and message sent to patient outreach to repeat colonoscopy in 5 years    Results seen by patient via mychart  Seen by patient Alejandro Lezama on 6/20/2024  4:02 PM

## 2024-07-06 ENCOUNTER — APPOINTMENT (OUTPATIENT)
Dept: CT IMAGING | Facility: HOSPITAL | Age: 64
End: 2024-07-06
Attending: EMERGENCY MEDICINE
Payer: MEDICARE

## 2024-07-06 ENCOUNTER — HOSPITAL ENCOUNTER (INPATIENT)
Facility: HOSPITAL | Age: 64
LOS: 2 days | Discharge: HOME | End: 2024-07-08
Attending: EMERGENCY MEDICINE | Admitting: INTERNAL MEDICINE
Payer: MEDICARE

## 2024-07-06 DIAGNOSIS — K81.0 ACUTE CHOLECYSTITIS: ICD-10-CM

## 2024-07-06 DIAGNOSIS — K37 APPENDICITIS: ICD-10-CM

## 2024-07-06 DIAGNOSIS — K35.30 ACUTE APPENDICITIS WITH LOCALIZED PERITONITIS, WITHOUT PERFORATION, ABSCESS, OR GANGRENE: Primary | ICD-10-CM

## 2024-07-06 LAB
ALBUMIN SERPL-MCNC: 4.7 G/DL (ref 3.2–4.8)
ALBUMIN/GLOB SERPL: 1.5 {RATIO} (ref 1–2)
ALP LIVER SERPL-CCNC: 89 U/L
ALT SERPL-CCNC: 50 U/L
ANION GAP SERPL CALC-SCNC: 7 MMOL/L (ref 0–18)
AST SERPL-CCNC: 30 U/L (ref ?–34)
BASOPHILS # BLD AUTO: 0.06 X10(3) UL (ref 0–0.2)
BASOPHILS NFR BLD AUTO: 0.4 %
BILIRUB SERPL-MCNC: 1.1 MG/DL (ref 0.2–1.1)
BILIRUB UR QL: NEGATIVE
BUN BLD-MCNC: 14 MG/DL (ref 9–23)
BUN/CREAT SERPL: 15.6 (ref 10–20)
CALCIUM BLD-MCNC: 9.5 MG/DL (ref 8.7–10.4)
CHLORIDE SERPL-SCNC: 102 MMOL/L (ref 98–112)
CLARITY UR: CLEAR
CO2 SERPL-SCNC: 27 MMOL/L (ref 21–32)
CREAT BLD-MCNC: 0.9 MG/DL
DEPRECATED RDW RBC AUTO: 37.8 FL (ref 35.1–46.3)
EGFRCR SERPLBLD CKD-EPI 2021: 95 ML/MIN/1.73M2 (ref 60–?)
EOSINOPHIL # BLD AUTO: 0.2 X10(3) UL (ref 0–0.7)
EOSINOPHIL NFR BLD AUTO: 1.4 %
ERYTHROCYTE [DISTWIDTH] IN BLOOD BY AUTOMATED COUNT: 11.4 % (ref 11–15)
GLOBULIN PLAS-MCNC: 3.1 G/DL (ref 2–3.5)
GLUCOSE BLD-MCNC: 116 MG/DL (ref 70–99)
GLUCOSE UR-MCNC: NORMAL MG/DL
HCT VFR BLD AUTO: 48.7 %
HGB BLD-MCNC: 17.7 G/DL
HGB UR QL STRIP.AUTO: NEGATIVE
IMM GRANULOCYTES # BLD AUTO: 0.07 X10(3) UL (ref 0–1)
IMM GRANULOCYTES NFR BLD: 0.5 %
LEUKOCYTE ESTERASE UR QL STRIP.AUTO: NEGATIVE
LIPASE SERPL-CCNC: 71 U/L (ref 13–75)
LYMPHOCYTES # BLD AUTO: 1.56 X10(3) UL (ref 1–4)
LYMPHOCYTES NFR BLD AUTO: 10.7 %
MCH RBC QN AUTO: 33.1 PG (ref 26–34)
MCHC RBC AUTO-ENTMCNC: 36.3 G/DL (ref 31–37)
MCV RBC AUTO: 91 FL
MONOCYTES # BLD AUTO: 0.89 X10(3) UL (ref 0.1–1)
MONOCYTES NFR BLD AUTO: 6.1 %
NEUTROPHILS # BLD AUTO: 11.76 X10 (3) UL (ref 1.5–7.7)
NEUTROPHILS # BLD AUTO: 11.76 X10(3) UL (ref 1.5–7.7)
NEUTROPHILS NFR BLD AUTO: 80.9 %
NITRITE UR QL STRIP.AUTO: NEGATIVE
OSMOLALITY SERPL CALC.SUM OF ELEC: 283 MOSM/KG (ref 275–295)
PH UR: 5.5 [PH] (ref 5–8)
PLATELET # BLD AUTO: 196 10(3)UL (ref 150–450)
POTASSIUM SERPL-SCNC: 4.1 MMOL/L (ref 3.5–5.1)
PROT SERPL-MCNC: 7.8 G/DL (ref 5.7–8.2)
PROT UR-MCNC: NEGATIVE MG/DL
RBC # BLD AUTO: 5.35 X10(6)UL
SODIUM SERPL-SCNC: 136 MMOL/L (ref 136–145)
SP GR UR STRIP: 1.02 (ref 1–1.03)
UROBILINOGEN UR STRIP-ACNC: NORMAL
WBC # BLD AUTO: 14.5 X10(3) UL (ref 4–11)

## 2024-07-06 PROCEDURE — 99223 1ST HOSP IP/OBS HIGH 75: CPT | Performed by: INTERNAL MEDICINE

## 2024-07-06 PROCEDURE — 74177 CT ABD & PELVIS W/CONTRAST: CPT | Performed by: EMERGENCY MEDICINE

## 2024-07-06 PROCEDURE — 99222 1ST HOSP IP/OBS MODERATE 55: CPT | Performed by: SURGERY

## 2024-07-06 RX ORDER — METHYLPREDNISOLONE SODIUM SUCCINATE 125 MG/2ML
80 INJECTION, POWDER, LYOPHILIZED, FOR SOLUTION INTRAMUSCULAR; INTRAVENOUS ONCE
Status: COMPLETED | OUTPATIENT
Start: 2024-07-06 | End: 2024-07-06

## 2024-07-06 RX ORDER — LORAZEPAM 0.5 MG/1
1 TABLET ORAL
Status: DISCONTINUED | OUTPATIENT
Start: 2024-07-06 | End: 2024-07-08

## 2024-07-06 RX ORDER — MORPHINE SULFATE 4 MG/ML
4 INJECTION, SOLUTION INTRAMUSCULAR; INTRAVENOUS EVERY 2 HOUR PRN
Status: DISCONTINUED | OUTPATIENT
Start: 2024-07-06 | End: 2024-07-08

## 2024-07-06 RX ORDER — THIAMINE HYDROCHLORIDE 100 MG/ML
100 INJECTION, SOLUTION INTRAMUSCULAR; INTRAVENOUS ONCE
Status: COMPLETED | OUTPATIENT
Start: 2024-07-06 | End: 2024-07-07

## 2024-07-06 RX ORDER — ONDANSETRON 2 MG/ML
4 INJECTION INTRAMUSCULAR; INTRAVENOUS EVERY 6 HOURS PRN
Status: DISCONTINUED | OUTPATIENT
Start: 2024-07-06 | End: 2024-07-08

## 2024-07-06 RX ORDER — HYDROCHLOROTHIAZIDE 12.5 MG/1
12.5 TABLET ORAL DAILY
Status: DISCONTINUED | OUTPATIENT
Start: 2024-07-07 | End: 2024-07-08

## 2024-07-06 RX ORDER — DEXTROSE MONOHYDRATE AND SODIUM CHLORIDE 5; .9 G/100ML; G/100ML
INJECTION, SOLUTION INTRAVENOUS CONTINUOUS
Status: DISCONTINUED | OUTPATIENT
Start: 2024-07-06 | End: 2024-07-08

## 2024-07-06 RX ORDER — METRONIDAZOLE 500 MG/100ML
500 INJECTION, SOLUTION INTRAVENOUS ONCE
Status: COMPLETED | OUTPATIENT
Start: 2024-07-06 | End: 2024-07-06

## 2024-07-06 RX ORDER — MORPHINE SULFATE 2 MG/ML
1 INJECTION, SOLUTION INTRAMUSCULAR; INTRAVENOUS EVERY 2 HOUR PRN
Status: DISCONTINUED | OUTPATIENT
Start: 2024-07-06 | End: 2024-07-08

## 2024-07-06 RX ORDER — LISINOPRIL AND HYDROCHLOROTHIAZIDE 20; 12.5 MG/1; MG/1
1 TABLET ORAL DAILY
Status: DISCONTINUED | OUTPATIENT
Start: 2024-07-06 | End: 2024-07-06

## 2024-07-06 RX ORDER — LORAZEPAM 0.5 MG/1
2 TABLET ORAL
Status: DISCONTINUED | OUTPATIENT
Start: 2024-07-06 | End: 2024-07-08

## 2024-07-06 RX ORDER — MORPHINE SULFATE 4 MG/ML
4 INJECTION, SOLUTION INTRAMUSCULAR; INTRAVENOUS ONCE
Status: COMPLETED | OUTPATIENT
Start: 2024-07-06 | End: 2024-07-06

## 2024-07-06 RX ORDER — KETOROLAC TROMETHAMINE 15 MG/ML
15 INJECTION, SOLUTION INTRAMUSCULAR; INTRAVENOUS ONCE
Status: COMPLETED | OUTPATIENT
Start: 2024-07-06 | End: 2024-07-06

## 2024-07-06 RX ORDER — METOCLOPRAMIDE HYDROCHLORIDE 5 MG/ML
10 INJECTION INTRAMUSCULAR; INTRAVENOUS EVERY 8 HOURS PRN
Status: DISCONTINUED | OUTPATIENT
Start: 2024-07-06 | End: 2024-07-08

## 2024-07-06 RX ORDER — DIPHENHYDRAMINE HYDROCHLORIDE 50 MG/ML
25 INJECTION INTRAMUSCULAR; INTRAVENOUS ONCE
Status: COMPLETED | OUTPATIENT
Start: 2024-07-06 | End: 2024-07-06

## 2024-07-06 RX ORDER — MULTIPLE VITAMINS W/ MINERALS TAB 9MG-400MCG
1 TAB ORAL DAILY
Status: DISCONTINUED | OUTPATIENT
Start: 2024-07-07 | End: 2024-07-08

## 2024-07-06 RX ORDER — MELATONIN
100 DAILY
Status: DISCONTINUED | OUTPATIENT
Start: 2024-07-07 | End: 2024-07-08

## 2024-07-06 RX ORDER — ACETAMINOPHEN 500 MG
500 TABLET ORAL EVERY 4 HOURS PRN
Status: DISCONTINUED | OUTPATIENT
Start: 2024-07-06 | End: 2024-07-08

## 2024-07-06 RX ORDER — HYDRALAZINE HYDROCHLORIDE 20 MG/ML
10 INJECTION INTRAMUSCULAR; INTRAVENOUS EVERY 6 HOURS PRN
Status: DISCONTINUED | OUTPATIENT
Start: 2024-07-06 | End: 2024-07-08

## 2024-07-06 RX ORDER — FOLIC ACID 1 MG/1
1 TABLET ORAL DAILY
Status: DISCONTINUED | OUTPATIENT
Start: 2024-07-07 | End: 2024-07-08

## 2024-07-06 RX ORDER — LISINOPRIL 20 MG/1
20 TABLET ORAL DAILY
Status: DISCONTINUED | OUTPATIENT
Start: 2024-07-07 | End: 2024-07-08

## 2024-07-06 RX ORDER — MORPHINE SULFATE 2 MG/ML
2 INJECTION, SOLUTION INTRAMUSCULAR; INTRAVENOUS EVERY 2 HOUR PRN
Status: DISCONTINUED | OUTPATIENT
Start: 2024-07-06 | End: 2024-07-08

## 2024-07-06 RX ORDER — METRONIDAZOLE 500 MG/100ML
500 INJECTION, SOLUTION INTRAVENOUS EVERY 12 HOURS
Status: DISCONTINUED | OUTPATIENT
Start: 2024-07-07 | End: 2024-07-08

## 2024-07-07 ENCOUNTER — ANESTHESIA (OUTPATIENT)
Dept: SURGERY | Facility: HOSPITAL | Age: 64
End: 2024-07-07
Payer: MEDICARE

## 2024-07-07 ENCOUNTER — ANESTHESIA EVENT (OUTPATIENT)
Dept: SURGERY | Facility: HOSPITAL | Age: 64
End: 2024-07-07
Payer: MEDICARE

## 2024-07-07 LAB
DEPRECATED RDW RBC AUTO: 37.2 FL (ref 35.1–46.3)
ERYTHROCYTE [DISTWIDTH] IN BLOOD BY AUTOMATED COUNT: 11.3 % (ref 11–15)
HCT VFR BLD AUTO: 44.5 %
HGB BLD-MCNC: 16.6 G/DL
INR BLD: 0.97 (ref 0.8–1.2)
MCH RBC QN AUTO: 33.7 PG (ref 26–34)
MCHC RBC AUTO-ENTMCNC: 37.3 G/DL (ref 31–37)
MCV RBC AUTO: 90.3 FL
PLATELET # BLD AUTO: 174 10(3)UL (ref 150–450)
PROTHROMBIN TIME: 13.4 SECONDS (ref 11.6–14.8)
RBC # BLD AUTO: 4.93 X10(6)UL
WBC # BLD AUTO: 13.8 X10(3) UL (ref 4–11)

## 2024-07-07 PROCEDURE — 99233 SBSQ HOSP IP/OBS HIGH 50: CPT | Performed by: HOSPITALIST

## 2024-07-07 PROCEDURE — 44970 LAPAROSCOPY APPENDECTOMY: CPT | Performed by: SURGERY

## 2024-07-07 PROCEDURE — 0DTJ4ZZ RESECTION OF APPENDIX, PERCUTANEOUS ENDOSCOPIC APPROACH: ICD-10-PCS | Performed by: SURGERY

## 2024-07-07 RX ORDER — MORPHINE SULFATE 4 MG/ML
4 INJECTION, SOLUTION INTRAMUSCULAR; INTRAVENOUS EVERY 10 MIN PRN
Status: DISCONTINUED | OUTPATIENT
Start: 2024-07-07 | End: 2024-07-07 | Stop reason: HOSPADM

## 2024-07-07 RX ORDER — HYDROMORPHONE HYDROCHLORIDE 1 MG/ML
0.4 INJECTION, SOLUTION INTRAMUSCULAR; INTRAVENOUS; SUBCUTANEOUS EVERY 5 MIN PRN
Status: DISCONTINUED | OUTPATIENT
Start: 2024-07-07 | End: 2024-07-07 | Stop reason: HOSPADM

## 2024-07-07 RX ORDER — ONDANSETRON 2 MG/ML
4 INJECTION INTRAMUSCULAR; INTRAVENOUS EVERY 6 HOURS PRN
Status: DISCONTINUED | OUTPATIENT
Start: 2024-07-07 | End: 2024-07-07

## 2024-07-07 RX ORDER — METOCLOPRAMIDE HYDROCHLORIDE 5 MG/ML
10 INJECTION INTRAMUSCULAR; INTRAVENOUS EVERY 8 HOURS PRN
Status: DISCONTINUED | OUTPATIENT
Start: 2024-07-07 | End: 2024-07-07 | Stop reason: HOSPADM

## 2024-07-07 RX ORDER — ONDANSETRON 2 MG/ML
4 INJECTION INTRAMUSCULAR; INTRAVENOUS EVERY 6 HOURS PRN
Status: DISCONTINUED | OUTPATIENT
Start: 2024-07-07 | End: 2024-07-07 | Stop reason: HOSPADM

## 2024-07-07 RX ORDER — HYDROCODONE BITARTRATE AND ACETAMINOPHEN 5; 325 MG/1; MG/1
1 TABLET ORAL EVERY 6 HOURS PRN
Status: DISCONTINUED | OUTPATIENT
Start: 2024-07-07 | End: 2024-07-08

## 2024-07-07 RX ORDER — SODIUM CHLORIDE, SODIUM LACTATE, POTASSIUM CHLORIDE, CALCIUM CHLORIDE 600; 310; 30; 20 MG/100ML; MG/100ML; MG/100ML; MG/100ML
INJECTION, SOLUTION INTRAVENOUS CONTINUOUS PRN
Status: DISCONTINUED | OUTPATIENT
Start: 2024-07-07 | End: 2024-07-07 | Stop reason: SURG

## 2024-07-07 RX ORDER — HYDROMORPHONE HYDROCHLORIDE 1 MG/ML
0.6 INJECTION, SOLUTION INTRAMUSCULAR; INTRAVENOUS; SUBCUTANEOUS EVERY 5 MIN PRN
Status: DISCONTINUED | OUTPATIENT
Start: 2024-07-07 | End: 2024-07-07 | Stop reason: HOSPADM

## 2024-07-07 RX ORDER — ROCURONIUM BROMIDE 10 MG/ML
INJECTION, SOLUTION INTRAVENOUS AS NEEDED
Status: DISCONTINUED | OUTPATIENT
Start: 2024-07-07 | End: 2024-07-07 | Stop reason: SURG

## 2024-07-07 RX ORDER — MORPHINE SULFATE 10 MG/ML
6 INJECTION, SOLUTION INTRAMUSCULAR; INTRAVENOUS EVERY 10 MIN PRN
Status: DISCONTINUED | OUTPATIENT
Start: 2024-07-07 | End: 2024-07-07 | Stop reason: HOSPADM

## 2024-07-07 RX ORDER — DEXAMETHASONE SODIUM PHOSPHATE 4 MG/ML
VIAL (ML) INJECTION AS NEEDED
Status: DISCONTINUED | OUTPATIENT
Start: 2024-07-07 | End: 2024-07-07 | Stop reason: SURG

## 2024-07-07 RX ORDER — HYDROMORPHONE HYDROCHLORIDE 1 MG/ML
0.2 INJECTION, SOLUTION INTRAMUSCULAR; INTRAVENOUS; SUBCUTANEOUS EVERY 5 MIN PRN
Status: DISCONTINUED | OUTPATIENT
Start: 2024-07-07 | End: 2024-07-07 | Stop reason: HOSPADM

## 2024-07-07 RX ORDER — MORPHINE SULFATE 4 MG/ML
2 INJECTION, SOLUTION INTRAMUSCULAR; INTRAVENOUS EVERY 10 MIN PRN
Status: DISCONTINUED | OUTPATIENT
Start: 2024-07-07 | End: 2024-07-07 | Stop reason: HOSPADM

## 2024-07-07 RX ORDER — HYDRALAZINE HYDROCHLORIDE 20 MG/ML
INJECTION INTRAMUSCULAR; INTRAVENOUS AS NEEDED
Status: DISCONTINUED | OUTPATIENT
Start: 2024-07-07 | End: 2024-07-07 | Stop reason: SURG

## 2024-07-07 RX ORDER — LABETALOL HYDROCHLORIDE 5 MG/ML
5 INJECTION, SOLUTION INTRAVENOUS EVERY 5 MIN PRN
Status: DISCONTINUED | OUTPATIENT
Start: 2024-07-07 | End: 2024-07-07 | Stop reason: HOSPADM

## 2024-07-07 RX ORDER — NALOXONE HYDROCHLORIDE 0.4 MG/ML
0.08 INJECTION, SOLUTION INTRAMUSCULAR; INTRAVENOUS; SUBCUTANEOUS AS NEEDED
Status: DISCONTINUED | OUTPATIENT
Start: 2024-07-07 | End: 2024-07-07 | Stop reason: HOSPADM

## 2024-07-07 RX ORDER — MIDAZOLAM HYDROCHLORIDE 1 MG/ML
INJECTION INTRAMUSCULAR; INTRAVENOUS AS NEEDED
Status: DISCONTINUED | OUTPATIENT
Start: 2024-07-07 | End: 2024-07-07 | Stop reason: SURG

## 2024-07-07 RX ORDER — BUPIVACAINE HYDROCHLORIDE AND EPINEPHRINE 5; 5 MG/ML; UG/ML
INJECTION, SOLUTION PERINEURAL AS NEEDED
Status: DISCONTINUED | OUTPATIENT
Start: 2024-07-07 | End: 2024-07-07 | Stop reason: HOSPADM

## 2024-07-07 RX ORDER — LIDOCAINE HYDROCHLORIDE 10 MG/ML
INJECTION, SOLUTION EPIDURAL; INFILTRATION; INTRACAUDAL; PERINEURAL AS NEEDED
Status: DISCONTINUED | OUTPATIENT
Start: 2024-07-07 | End: 2024-07-07 | Stop reason: SURG

## 2024-07-07 RX ORDER — SODIUM CHLORIDE, SODIUM LACTATE, POTASSIUM CHLORIDE, CALCIUM CHLORIDE 600; 310; 30; 20 MG/100ML; MG/100ML; MG/100ML; MG/100ML
INJECTION, SOLUTION INTRAVENOUS CONTINUOUS
Status: DISCONTINUED | OUTPATIENT
Start: 2024-07-07 | End: 2024-07-07 | Stop reason: HOSPADM

## 2024-07-07 RX ORDER — LABETALOL HYDROCHLORIDE 5 MG/ML
INJECTION, SOLUTION INTRAVENOUS AS NEEDED
Status: DISCONTINUED | OUTPATIENT
Start: 2024-07-07 | End: 2024-07-07 | Stop reason: SURG

## 2024-07-07 RX ORDER — ONDANSETRON 2 MG/ML
INJECTION INTRAMUSCULAR; INTRAVENOUS AS NEEDED
Status: DISCONTINUED | OUTPATIENT
Start: 2024-07-07 | End: 2024-07-07 | Stop reason: SURG

## 2024-07-07 RX ADMIN — HYDRALAZINE HYDROCHLORIDE 10 MG: 20 INJECTION INTRAMUSCULAR; INTRAVENOUS at 09:45:00

## 2024-07-07 RX ADMIN — SODIUM CHLORIDE, SODIUM LACTATE, POTASSIUM CHLORIDE, CALCIUM CHLORIDE: 600; 310; 30; 20 INJECTION, SOLUTION INTRAVENOUS at 09:10:00

## 2024-07-07 RX ADMIN — LABETALOL HYDROCHLORIDE 5 MG: 5 INJECTION, SOLUTION INTRAVENOUS at 09:36:00

## 2024-07-07 RX ADMIN — LIDOCAINE HYDROCHLORIDE 50 MG: 10 INJECTION, SOLUTION EPIDURAL; INFILTRATION; INTRACAUDAL; PERINEURAL at 09:15:00

## 2024-07-07 RX ADMIN — ONDANSETRON 4 MG: 2 INJECTION INTRAMUSCULAR; INTRAVENOUS at 09:15:00

## 2024-07-07 RX ADMIN — HYDRALAZINE HYDROCHLORIDE 5 MG: 20 INJECTION INTRAMUSCULAR; INTRAVENOUS at 09:40:00

## 2024-07-07 RX ADMIN — ROCURONIUM BROMIDE 50 MG: 10 INJECTION, SOLUTION INTRAVENOUS at 09:15:00

## 2024-07-07 RX ADMIN — MIDAZOLAM HYDROCHLORIDE 2 MG: 1 INJECTION INTRAMUSCULAR; INTRAVENOUS at 09:15:00

## 2024-07-07 RX ADMIN — SODIUM CHLORIDE, SODIUM LACTATE, POTASSIUM CHLORIDE, CALCIUM CHLORIDE: 600; 310; 30; 20 INJECTION, SOLUTION INTRAVENOUS at 10:13:00

## 2024-07-07 RX ADMIN — LABETALOL HYDROCHLORIDE 10 MG: 5 INJECTION, SOLUTION INTRAVENOUS at 09:40:00

## 2024-07-07 RX ADMIN — DEXAMETHASONE SODIUM PHOSPHATE 4 MG: 4 MG/ML VIAL (ML) INJECTION at 09:15:00

## 2024-07-07 NOTE — ANESTHESIA PROCEDURE NOTES
Airway  Date/Time: 7/7/2024 9:18 AM  Urgency: Elective    Airway not difficult    General Information and Staff    Patient location during procedure: OR  Anesthesiologist: Carlos Hdz MD  Performed: anesthesiologist   Performed by: Carlos Hdz MD  Authorized by: Carlos Hdz MD      Indications and Patient Condition  Indications for airway management: anesthesia  Spontaneous ventilation: present  Sedation level: deep  Preoxygenated: yes  Patient position: sniffing  MILS maintained throughout  Mask difficulty assessment: 1 - vent by mask    Final Airway Details  Final airway type: endotracheal airway      Successful airway: ETT  Cuffed: yes   Successful intubation technique: direct laryngoscopy  Endotracheal tube insertion site: oral  Blade: Figueroa  Blade size: #3  ETT size (mm): 8.0    Cormack-Lehane Classification: grade IIA - partial view of glottis  Placement verified by: capnometry   Cuff volume (mL): 10  Measured from: teeth  ETT to teeth (cm): 24  Number of attempts at approach: 1  Number of other approaches attempted: 0

## 2024-07-07 NOTE — ED PROVIDER NOTES
Patient Seen in: [facility] Emergency Department    History     Chief Complaint   Patient presents with    Abdomen/Flank Pain       HPI    History is provided by patient/independent historian: patient  64 year old male with history of pancreatitis, diverticulitis, hypertension, here with complaints of right lower quadrant abdominal pain that started earlier today.  The pain is getting significantly worse.  No associated nausea or vomiting or changes in bowel movements.  No fevers.  No dysuria.  No flank pain.  No testicular pain    History reviewed.   Past Medical History:    Acute cholecystitis    Acute cholecystitis with E. coli bacteremia Rx cholecystostomy tube; s/p laparoscopic cholecystectomy 1-21    Acute diverticulitis    Aortic atherosclerosis (HCC)    CXR 11-20    Colon polyps    Duodenal ulcer with hemorrhage    EGD with H. pylori negative    Dyslipidemia    Essential hypertension    Gastrointestinal hemorrhage    Melena; EGD with small hiatal hernia, gastric polyps, mild gastritis, biopsies benign, H. pylori negative; colonoscopy with diverticulosis and internal hemorrhoids    GERD (gastroesophageal reflux disease)    Hepatic steatosis    Ultrasound 1-19    High blood pressure    History of acute pancreatitis    Secondary to alcohol    Lyme disease    Erythema migrans, treated    Visual impairment    glasses         History reviewed.   Past Surgical History:   Procedure Laterality Date    Colonoscopy  2010    4th colon; Dr. Whitley    Colonoscopy N/A 03/05/2018    Procedure: COLONOSCOPY;  Surgeon: Brian Whitley MD;  Location: [facility] ENDOSCOPY    Colonoscopy N/A 01/23/2019    Procedure: COLONOSCOPY;  Surgeon: Brian Whitley MD;  Location: [facility] ENDOSCOPY    Colonoscopy N/A 06/20/2024    MD Gutierrez, colonoscopy with polyp Bx x 2, diverticulosis    Colonoscopy N/A 6/20/2024    Procedure: COLONOSCOPYn with polyp biopsy;  Surgeon: Brian Whitley MD;  Location: [facility] ENDOSCOPY    Ir cholecystostomy   12/22/2020    Laparoscopic cholecystectomy  01/2021         Home Medications reviewed :  (Not in a hospital admission)        History reviewed.   Social History     Socioeconomic History    Marital status: Single   Occupational History    Occupation:    Tobacco Use    Smoking status: Never    Smokeless tobacco: Never   Vaping Use    Vaping status: Never Used   Substance and Sexual Activity    Alcohol use: Yes     Alcohol/week: 8.0 standard drinks of alcohol     Types: 4 Cans of beer, 4 Shots of liquor per week     Comment: social- last yesterday    Drug use: No    Sexual activity: Not Currently     Partners: Female   Other Topics Concern    Caffeine Concern Yes     Comment: coffee, 2cups/day         ROS  Review of Systems   Respiratory:  Negative for shortness of breath.    Cardiovascular:  Negative for chest pain.   Gastrointestinal:  Positive for abdominal pain.   All other systems reviewed and are negative.     All other pertinent organ systems are reviewed and are negative.      Physical Exam     ED Triage Vitals [07/06/24 1938]   BP (!) 162/100   Pulse 68   Resp 20   Temp 98 °F (36.7 °C)   Temp src Temporal   SpO2 98 %   O2 Device None (Room air)     Vital signs reviewed.      Physical Exam  Vitals and nursing note reviewed.   Cardiovascular:      Pulses: Normal pulses.   Pulmonary:      Effort: No respiratory distress.   Abdominal:      General: There is no distension.      Tenderness: There is abdominal tenderness in the right lower quadrant. There is guarding.   Neurological:      Mental Status: He is alert.         ED Course       Labs:     Labs Reviewed   COMP METABOLIC PANEL (14) - Abnormal; Notable for the following components:       Result Value    Glucose 116 (*)     ALT 50 (*)     All other components within normal limits   URINALYSIS WITH CULTURE REFLEX - Abnormal; Notable for the following components:    Ketones Urine Trace (*)     All other components within normal limits   CBC W/  DIFFERENTIAL - Abnormal; Notable for the following components:    WBC 14.5 (*)     HGB 17.7 (*)     Neutrophil Absolute Prelim 11.76 (*)     Neutrophil Absolute 11.76 (*)     All other components within normal limits   LIPASE - Normal   CBC WITH DIFFERENTIAL WITH PLATELET    Narrative:     The following orders were created for panel order CBC With Differential With Platelet.                  Procedure                               Abnormality         Status                                     ---------                               -----------         ------                                     CBC W/ DIFFERENTIAL[[id number]]          Abnormal            Final result                                                 Please view results for these tests on the individual orders.         My EKG Interpretation:   As reviewed and Interpreted by me      Imaging Results Available and Reviewed while in ED:   No results found.        CT ABDOMEN PELVIS WITH CONTRAST     COMPARISON: 2/21/2022     IMPRESSION:     Acute appendicitis, with a dilated and inflamed 14 mm diameter appendix.  Moderate inflammatory changes.  Obstructing appendicolith in the appendiceal base.     No perforation.  No free air.  No abscess.     Additional nonacute:  No bowel obstruction.  No diverticulitis.  Cholecystectomy.  Nonobstructing right renal calyceal stone.  Renal cysts.  No ureter stone.  Lung bases clear.  Small left greater than right fat-containing inguinal hernias.    Case discussed with Dr. Cline at 10:53 PM ET.    Jordan Short MD/PhD  This report has been electronically signed and verified by the Radiologist whose name is printed above.  My review and independent interpretation of CT images: no free fluid. Radiology report corroborates this in addition to other details as reported by them.      Decision rules/scores evaluated: none      Diagnostic labs/tests considered but not ordered: none    ED Medications Administered:   Medications    cefTRIAXone (Rocephin) 1 g in sodium chloride 0.9% 100 mL IVPB-ADDV (has no administration in time range)   metRONIDAZOLE in sodium chloride 0.79% (Flagyl) 5 mg/mL IVPB premix 500 mg (has no administration in time range)   diphenhydrAMINE (Benadryl) 50 mg/mL  injection 25 mg (25 mg Intravenous Given 7/6/24 2027)   methylPREDNISolone sodium succinate (Solu-MEDROL) injection 80 mg (80 mg Intravenous Given 7/6/24 2027)   sodium chloride 0.9 % IV bolus 1,000 mL (0 mL Intravenous Stopped 7/6/24 2128)   morphINE PF 4 MG/ML injection 4 mg (4 mg Intravenous Given 7/6/24 2028)   morphINE PF 4 MG/ML injection 4 mg (4 mg Intravenous Given 7/6/24 2128)   iopamidol 76% (ISOVUE-370) injection for power injector (80 mL Intravenous Given 7/6/24 2145)                MDM       Medical Decision Making      Differential Diagnosis: After obtaining the patient's history, performing the physical exam and reviewing the diagnostics, multiple initial diagnoses were considered based on the presenting problem including appendicitis, ureterolithiasis, pyelonephritis, gastroenteritis, perforated intestine    External document review: I personally reviewed available external medical records for any recent pertinent discharge summaries, testing, and procedures - the findings are as follows: 6/20/24 visit with  for colonoscopy with polyp biopsy    Complicating Factors: The patient already  has a past medical history of Acute cholecystitis (12/2020), Acute diverticulitis (01/2014), Aortic atherosclerosis (HCC), Colon polyps, Duodenal ulcer with hemorrhage (05/2010), Dyslipidemia, Essential hypertension, Gastrointestinal hemorrhage (01/2019), GERD (gastroesophageal reflux disease), Hepatic steatosis, High blood pressure, History of acute pancreatitis (11/2020), Lyme disease (07/2010), and Visual impairment. to contribute to the complexity of this ED evaluation.    Procedures performed: none    Discussed management with physician/appropriate  source: Dr. Sutton, Dr. Akins    Considered admission/deescalation of care for: none    Social determinants of health affecting patient care: none    Prescription medications considered: discussed continuing current medication regimen    The patient requires continuous monitoring for: abdominal pain    Shared decision making: discussed possible admission        Disposition and Plan     Clinical Impression:  1. Acute appendicitis with localized peritonitis, without perforation, abscess, or gangrene        Disposition:  Admit    Follow-up:  No follow-up provider specified.    Medications Prescribed:  Current Discharge Medication List          Hospital Problems       Present on Admission  Date Reviewed: 5/29/2024            ICD-10-CM Noted POA    * (Principal) Acute appendicitis with localized peritonitis, without perforation, abscess, or gangrene K35.30 7/6/2024 Unknown

## 2024-07-07 NOTE — CONSULTS
[facility]  part of [facility]    Report of Consultation    Alejandro Lezama Patient Status:  Inpatient    1960 MRN [id number]   Location [facility] PRE OP RECOVERY Attending Teodora Akins MD   Hosp Day # 1 PCP Alejandro Humphries MD     Date of Admission:  2024  Date of Consult: 2024    Reason for Consultation:   Consults    History provided by: Pt    Chief Complaint   Patient presents with    Abdomen/Flank Pain     HPI  Pt developed RLQ abd pain yesterday. He presented to the ED for evaluation. WBC 13.8.    CTAP  CONCLUSION:     Acute appendicitis with appendicolith at the base.  Recommend surgical consultation.     Extensive sigmoid colon diverticulosis.  Associated diffuse wall thickening of the sigmoid colon is noted.  Chronic diverticulitis is a possibility in the appropriate clinical setting.  Recommend colonoscopy if not recently performed, to exclude  underlying lesion.     Nondilated fluid-filled small bowel loops in the lower abdomen/pelvis, thought to represent a reactive ileus, given the acute appendicitis/inflammatory change in the right lower quadrant.     Diffuse hepatic steatosis.       History     Past Medical History:    Acute cholecystitis    Acute cholecystitis with E. coli bacteremia Rx cholecystostomy tube; s/p laparoscopic cholecystectomy 1-21    Acute diverticulitis    Aortic atherosclerosis (HCC)    CXR 11-20    Colon polyps    Duodenal ulcer with hemorrhage    EGD with H. pylori negative    Dyslipidemia    Essential hypertension    Gastrointestinal hemorrhage    Melena; EGD with small hiatal hernia, gastric polyps, mild gastritis, biopsies benign, H. pylori negative; colonoscopy with diverticulosis and internal hemorrhoids    GERD (gastroesophageal reflux disease)    Hepatic steatosis    Ultrasound 1-19    High blood pressure    History of acute pancreatitis    Secondary to alcohol    Lyme disease    Erythema migrans, treated    Visual  impairment    glasses     Past Surgical History:   Procedure Laterality Date    Colonoscopy  2010    4th colon; Dr. Whitley    Colonoscopy N/A 03/05/2018    Procedure: COLONOSCOPY;  Surgeon: Brian Whitley MD;  Location: [facility] ENDOSCOPY    Colonoscopy N/A 01/23/2019    Procedure: COLONOSCOPY;  Surgeon: Brian Whitley MD;  Location: [facility] ENDOSCOPY    Colonoscopy N/A 06/20/2024    MD Gutierrez, colonoscopy with polyp Bx x 2, diverticulosis    Colonoscopy N/A 6/20/2024    Procedure: COLONOSCOPYn with polyp biopsy;  Surgeon: Brian Whitley MD;  Location: [facility] ENDOSCOPY    Ir cholecystostomy  12/22/2020    Laparoscopic cholecystectomy  01/2021     Family History   Problem Relation Age of Onset    Diabetes Father     Hypertension Father     Heart Disease Father         Angina age 70s; CABG age 81    Colon Cancer Father     Other (Lung Cancer) Father     Other (Parkinson's) Mother     Other (Parkinson's) Maternal Aunt     Breast Cancer Paternal Grandmother     Cancer Paternal Grandmother         breast cancer     Social History:  Social History     Socioeconomic History    Marital status: Single   Occupational History    Occupation:    Tobacco Use    Smoking status: Never    Smokeless tobacco: Never   Vaping Use    Vaping status: Never Used   Substance and Sexual Activity    Alcohol use: Yes     Alcohol/week: 8.0 standard drinks of alcohol     Types: 4 Cans of beer, 4 Shots of liquor per week     Comment: social- last yesterday    Drug use: No    Sexual activity: Not Currently     Partners: Female   Other Topics Concern    Caffeine Concern Yes     Comment: coffee, 2cups/day     Social Determinants of Health     Food Insecurity: No Food Insecurity (7/6/2024)    Food Insecurity     Food Insecurity: Never true   Transportation Needs: No Transportation Needs (7/6/2024)    Transportation Needs     Lack of Transportation: No   Housing Stability: Low Risk  (7/6/2024)    Housing Stability     Housing  Instability: No     Allergies/Medications:   Allergies:   Allergies   Allergen Reactions    Amoxicillin HIVES    Radiology Contrast Iodinated Dyes HIVES and JITTERY     Medications Prior to Admission   Medication Sig    lisinopril-hydroCHLOROthiazide 20-12.5 MG Oral Tab Take 1 tablet by mouth daily.    Multiple Vitamin (MULTI VITAMIN MENS) Oral Tab Take 1 tablet by mouth daily.    omega-3 fatty acids 1000 MG Oral Cap Take 1,000 mg by mouth daily. Take 3 pills daily    hydrocortisone 2.5 % External Cream Place 1 Dose rectally 2 (two) times daily. Apply by topical route 2 times every day to the affected area(s)    [] cyclobenzaprine 10 MG Oral Tab Take 1 tablet (10 mg total) by mouth 3 (three) times daily as needed for Muscle spasms. (Patient not taking: Reported on 2024)       Review of Systems:   Review of Systems       GENERAL Per HPI  SKIN: no ulcerated or worrisome skin lesions  EYES:denies blurred vision or double vision  HEENT: denies new nasal congestion, sinus pain or ST  LUNGS: denies shortness of breath with exertion  CARDIOVASCULAR: denies chest pain on exertion  GI: no hematemesis, no BRBPR, no worsening heartburn  : no dysuria, no blood in urine, no difficulty urinating  MUSCULOSKELETAL: no new musculoskeletal complaints  NEURO: no persistent, recurrent  headaches  PSYCHE:no depression or anxiety  HEMATOLOGIC: no hx of blood dyscrasia  ENDOCRINE: no new endocrine problems  ALL/ASTHMA: no new hx of severe allergy or asthma  BACK: normal, no spinal deformity, no CVA tenderness      Physical Exam:   Vital Signs:   weight is 223 lb 12.8 oz (101.5 kg). His oral temperature is 98.4 °F (36.9 °C). His blood pressure is 133/77 and his pulse is 84. His respiration is 16 and oxygen saturation is 97%.   Physical Exam  Alert, NAD  HEENT wnl, anicteric, PERRL  Neck supple, norm ROM, no JVD  L CTA B  H Reg rate  Abd soft,  RLQ T, ND, no masses, no hernias, no HSM.  Extr no c/c/e  Skin intact, no jaundice,  no rashes, no lesions  Neuro grossly intact, no focal deficits, no tremors  Back no deformity, no CVA tnd.     Results:     Lab Results   Component Value Date    WBC 13.8 (H) 07/07/2024    HGB 16.6 07/07/2024    HCT 44.5 07/07/2024    .0 07/07/2024    CREATSERUM 0.90 07/06/2024    BUN 14 07/06/2024     07/06/2024    K 4.1 07/06/2024     07/06/2024    CO2 27.0 07/06/2024     (H) 07/06/2024    CA 9.5 07/06/2024    ALB 4.7 07/06/2024    ALKPHO 89 07/06/2024    BILT 1.1 07/06/2024    TP 7.8 07/06/2024    AST 30 07/06/2024    ALT 50 (H) 07/06/2024    INR 0.97 07/07/2024    TSH 2.03 01/24/2014    LIP 71 07/06/2024    PSA 0.7 10/31/2017    ESRML 7 08/15/2018    CRP 9.3 (H) 08/15/2018    MG 2.2 12/22/2020    TROPHS 3 04/13/2024    ETOH <3 04/13/2024     CT ABDOMEN+PELVIS(CONTRAST ONLY)(CPT=74177)    Result Date: 7/7/2024  CONCLUSION:   Acute appendicitis with appendicolith at the base.  Recommend surgical consultation.  Extensive sigmoid colon diverticulosis.  Associated diffuse wall thickening of the sigmoid colon is noted.  Chronic diverticulitis is a possibility in the appropriate clinical setting.  Recommend colonoscopy if not recently performed, to exclude underlying lesion.  Nondilated fluid-filled small bowel loops in the lower abdomen/pelvis, thought to represent a reactive ileus, given the acute appendicitis/inflammatory change in the right lower quadrant.  Diffuse hepatic steatosis.    A preliminary report was issued by the [facility] teleradiology service. There are no clinically actionable discrepancies.  Preliminary finding were discussed with Dr. Cline by  On 07/06/2024 at 10:53 p.m. Eastern time  Dictated by (CST): Katherine Angulo MD on 7/07/2024 at 6:21 AM     Finalized by (CST): Katherine Angulo MD on 7/07/2024 at 6:29 AM               Impression:     Acute appendicitis with localized peritonitis, without perforation, abscess, or gangrene             Recommendations:  Plan laparoscopic appendectomy. Abx inititated.   We have discussed the surgical risks, benefits, alternatives, and expected recovery. All of the patient's questions have been answered to his satisfaction.      Anastasiya Sutton MD  7/7/2024

## 2024-07-07 NOTE — PLAN OF CARE
Patient is alert and oriented x4. Patient is on room air and remote tele. VSS. Patient is on IVF and is NPO. Patient is on iv antibiotics. Patient denies n/v/d and sob. Patient reported severe pain, prn ivp morphine 4mg x1 was administered and was partially effective. Patient is voiding freely and last had a bm 7/6/24. Safety measures are in place.       Problem: Patient Centered Care  Goal: Patient preferences are identified and integrated in the patient's plan of care  Description: Interventions:  - What would you like us to know as we care for you? I am from home.   - Provide timely, complete, and accurate information to patient/family  - Incorporate patient and family knowledge, values, beliefs, and cultural backgrounds into the planning and delivery of care  - Encourage patient/family to participate in care and decision-making at the level they choose  - Honor patient and family perspectives and choices  Outcome: Progressing     Problem: Patient/Family Goals  Goal: Patient/Family Long Term Goal  Description: Patient's Long Term Goal: Discharge home     Interventions:  - Monitor labs  - Monitor vitals  - NPO  - IVF  - IV antibiotics  - Manage pain   - CIWA  - General surgery on consult   - See additional Care Plan goals for specific interventions  Outcome: Progressing  Goal: Patient/Family Short Term Goal  Description: Patient's Short Term Goal: Manage pain     Interventions:   - Frequent pain assessments  - Non-pharmacological interventions  - Pain medications as needed/indicated  - See additional Care Plan goals for specific interventions  Outcome: Progressing     Problem: PAIN - ADULT  Goal: Verbalizes/displays adequate comfort level or patient's stated pain goal  Description: INTERVENTIONS:  - Encourage pt to monitor pain and request assistance  - Assess pain using appropriate pain scale  - Administer analgesics based on type and severity of pain and evaluate response  - Implement non-pharmacological  measures as appropriate and evaluate response  - Consider cultural and social influences on pain and pain management  - Manage/alleviate anxiety  - Utilize distraction and/or relaxation techniques  - Monitor for opioid side effects  - Notify MD/LIP if interventions unsuccessful or patient reports new pain  - Anticipate increased pain with activity and pre-medicate as appropriate  Outcome: Progressing     Problem: RISK FOR INFECTION - ADULT  Goal: Absence of fever/infection during anticipated neutropenic period  Description: INTERVENTIONS  - Monitor WBC  - Administer growth factors as ordered  - Implement neutropenic guidelines  Outcome: Progressing     Problem: SAFETY ADULT - FALL  Goal: Free from fall injury  Description: INTERVENTIONS:  - Assess pt frequently for physical needs  - Identify cognitive and physical deficits and behaviors that affect risk of falls.  - Anderson fall precautions as indicated by assessment.  - Educate pt/family on patient safety including physical limitations  - Instruct pt to call for assistance with activity based on assessment  - Modify environment to reduce risk of injury  - Provide assistive devices as appropriate  - Consider OT/PT consult to assist with strengthening/mobility  - Encourage toileting schedule  Outcome: Progressing     Problem: DISCHARGE PLANNING  Goal: Discharge to home or other facility with appropriate resources  Description: INTERVENTIONS:  - Identify barriers to discharge w/pt and caregiver  - Include patient/family/discharge partner in discharge planning  - Arrange for needed discharge resources and transportation as appropriate  - Identify discharge learning needs (meds, wound care, etc)  - Arrange for interpreters to assist at discharge as needed  - Consider post-discharge preferences of patient/family/discharge partner  - Complete POLST form as appropriate  - Assess patient's ability to be responsible for managing their own health  - Refer to Case Management  Department for coordinating discharge planning if the patient needs post-hospital services based on physician/LIP order or complex needs related to functional status, cognitive ability or social support system  Outcome: Progressing

## 2024-07-07 NOTE — H&P
History and Physical     Alejandro Lezama Patient Status:  Emergency    1960 MRN [id number]   Location [facility] EMERGENCY DEPARTMENT Attending Chikis Cline MD   Hosp Day # 0 PCP Alejandro Humphries MD       Chief Complaint: RLQ pain    Subjective:    Alejandro Lezama is a 64 year old male with history of HTN, GI bleed, GERD, diverticulitis, pancreatitis, HLD who presented to the ED with complaints of RLQ pain.  Onset earlier in the day today.  No associated nausea, vomiting, fever or chills.  Pain is worse in RLQ.  He also has mild epigastric/LUQ discomfort.  No chest pain or shortness of breath.    Per patient, drinks a sixpack daily.  Lipase normal..    Labs within normal except WBC 14.5 and hemoglobin 17.7  Vital stable except for elevated BP  CT shows acute appendicitis    IV antibiotics started.  General surgery consulted.  Patient will be admitted for further treatment.      History/Other:      Past Medical History:  Past Medical History:    Acute cholecystitis    Acute cholecystitis with E. coli bacteremia Rx cholecystostomy tube; s/p laparoscopic cholecystectomy 1-21    Acute diverticulitis    Aortic atherosclerosis (HCC)    CXR 11-20    Colon polyps    Duodenal ulcer with hemorrhage    EGD with H. pylori negative    Dyslipidemia    Essential hypertension    Gastrointestinal hemorrhage    Melena; EGD with small hiatal hernia, gastric polyps, mild gastritis, biopsies benign, H. pylori negative; colonoscopy with diverticulosis and internal hemorrhoids    GERD (gastroesophageal reflux disease)    Hepatic steatosis    Ultrasound 1-19    High blood pressure    History of acute pancreatitis    Secondary to alcohol    Lyme disease    Erythema migrans, treated    Visual impairment    glasses        Past Surgical History:   Past Surgical History:   Procedure Laterality Date    Colonoscopy  2010 colon; Dr. Whitley    Colonoscopy N/A 2018    Procedure: COLONOSCOPY;   Surgeon: Brian Whitley MD;  Location: [facility] ENDOSCOPY    Colonoscopy N/A 2019    Procedure: COLONOSCOPY;  Surgeon: Brian Whitley MD;  Location: [facility] ENDOSCOPY    Colonoscopy N/A 2024    MD Gutierrez, colonoscopy with polyp Bx x 2, diverticulosis    Colonoscopy N/A 2024    Procedure: COLONOSCOPYn with polyp biopsy;  Surgeon: Brian Whitley MD;  Location: [facility] ENDOSCOPY    Ir cholecystostomy  2020    Laparoscopic cholecystectomy  2021       Social History:  reports that he has never smoked. He has never used smokeless tobacco. He reports current alcohol use of about 8.0 standard drinks of alcohol per week. He reports that he does not use drugs.    Family History:   Family History   Problem Relation Age of Onset    Diabetes Father     Hypertension Father     Heart Disease Father         Angina age 70s; CABG age 81    Colon Cancer Father     Other (Lung Cancer) Father     Other (Parkinson's) Mother     Other (Parkinson's) Maternal Aunt     Breast Cancer Paternal Grandmother     Cancer Paternal Grandmother         breast cancer       Allergies:   Allergies   Allergen Reactions    Amoxicillin HIVES    Radiology Contrast Iodinated Dyes HIVES and JITTERY       Medications:    No current facility-administered medications on file prior to encounter.     Current Outpatient Medications on File Prior to Encounter   Medication Sig Dispense Refill    hydrocortisone 2.5 % External Cream Place 1 Dose rectally 2 (two) times daily. Apply by topical route 2 times every day to the affected area(s) 28 g 0    lisinopril-hydroCHLOROthiazide 20-12.5 MG Oral Tab Take 1 tablet by mouth daily. 90 tablet 1    [] cyclobenzaprine 10 MG Oral Tab Take 1 tablet (10 mg total) by mouth 3 (three) times daily as needed for Muscle spasms. (Patient not taking: Reported on 2024) 30 tablet 0    Multiple Vitamin (MULTI VITAMIN MENS) Oral Tab Take 1 tablet by mouth daily.      omega-3 fatty acids 1000 MG Oral Cap  Take 1,000 mg by mouth daily. Take 3 pills daily         Review of Systems:   A comprehensive 14 point review of systems was completed.    Pertinent positives and negatives noted in the HPI.    Objective:     BP (!) 162/92   Pulse 62   Temp 98 °F (36.7 °C) (Temporal)   Resp 17   Wt 230 lb (104.3 kg)   SpO2 97%   BMI 31.19 kg/m²   General: No acute distress.    HEENT: Normocephalic, atraumatic.  Neck: Supple.  Respiratory: Normal effort.  CTAB  Cardiovascular:, S2 regular.  No murmur.  Normal rate S1.   Abdomen: Soft, not distended.  Mild epigastric tenderness.  Moderate tenderness.  No rebound or guarding.  Neurologic:  alert, oriented x 3.  Nonfocal.  Musculoskeletal: No tenderness or deformity.  Extremities: No edema  Psychiatric: Calm and cooperative    Results:      Labs:  Labs Last 24 Hours:   BMP     CBC    Other     Na 136 Cl 102 BUN 14 Glu 116   Hb 17.7   PTT - Procal -   K 4.1 CO2 27.0 Cr 0.90   WBC 14.5 >< .0  INR - CRP -   Renal Lytes Endo    Hct 48.7   Trop - D dim -   eGFR - Ca 9.5 POC Gluc  -    LFT   pBNP - Lactic -   eGFR AA - PO4 - A1c -   AST 30 APk 89 Prot 7.8  LDL -     Mg - TSH -   ALT 50 T krissy 1.1 Alb 4.7          COVID-19 Lab Results    COVID-19  Lab Results   Component Value Date    COVID19 Detected (A) 02/27/2024    COVID19 Not Detected 01/11/2021    COVID19 Not Detected 12/20/2020       Pro-Calcitonin  No results for input(s): \"PCT\" in the last 168 hours.    Cardiac  No results for input(s): \"TROP\", \"PBNP\" in the last 168 hours.    Creatinine Kinase  No results for input(s): \"CK\" in the last 168 hours.    Inflammatory Markers  No results for input(s): \"CRP\", \"CHAKA\", \"LDH\", \"DDIMER\" in the last 168 hours.    Imaging: Imaging data reviewed in Epic.    Assessment & Plan:    Acute appendicitis  -Admit  -N.p.o.  -IV fluids  -IV pain control  -IV antibiotics  -General surgery consult    Alcohol use   -Denies history of alcohol withdrawal.  Monitor    HTN  HLD  -Home medications  -As  needed IV BP control    Quality:  DVT Prophylaxis: SCD  CODE status: Full code  BRAN: 1 to 2 days    Plan of care discussed with patient. Acknowledged understanding and agrees to plan. Also discussed with the ED physician.    High MDM  Time spent on this admission - examining patient, obtaining history, reviewing previous medical records, going over test results/imaging and discussing plan of care. More than 50% of the time was spent in counseling and/or coordination of care related to acute appendicitis.   All questions answered.     Teodora Akins MD  7/6/2024

## 2024-07-07 NOTE — CONSULTS
[facility]  part of [facility]    Report of Consultation    Alejandro Lezama Patient Status:  Emergency    1960 MRN [id number]   Location [facility] EMERGENCY DEPARTMENT Attending Chikis Cline MD   Hosp Day # 0 PCP Alejandro Humphries MD     Date of Admission:  2024  Date of Consult: 2024    Reason for Consultation:   Consults    Brief Note Full Consult To Follow  HPI:     Chief Complaint   Patient presents with    Abdomen/Flank Pain     HPI  Appendicitis    History     Past Medical History:    Acute cholecystitis    Acute cholecystitis with E. coli bacteremia Rx cholecystostomy tube; s/p laparoscopic cholecystectomy 1-21    Acute diverticulitis    Aortic atherosclerosis (HCC)    CXR 11-20    Colon polyps    Duodenal ulcer with hemorrhage    EGD with H. pylori negative    Dyslipidemia    Essential hypertension    Gastrointestinal hemorrhage    Melena; EGD with small hiatal hernia, gastric polyps, mild gastritis, biopsies benign, H. pylori negative; colonoscopy with diverticulosis and internal hemorrhoids    GERD (gastroesophageal reflux disease)    Hepatic steatosis    Ultrasound 1-19    High blood pressure    History of acute pancreatitis    Secondary to alcohol    Lyme disease    Erythema migrans, treated    Visual impairment    glasses     Past Surgical History:   Procedure Laterality Date    Colonoscopy      4th colon; Dr. Whitley    Colonoscopy N/A 2018    Procedure: COLONOSCOPY;  Surgeon: Brian Whitley MD;  Location: [facility] ENDOSCOPY    Colonoscopy N/A 2019    Procedure: COLONOSCOPY;  Surgeon: Brian Whitley MD;  Location: [facility] ENDOSCOPY    Colonoscopy N/A 2024    MD Gutierrez, colonoscopy with polyp Bx x 2, diverticulosis    Colonoscopy N/A 2024    Procedure: COLONOSCOPYn with polyp biopsy;  Surgeon: Brian Whitley MD;  Location: [facility] ENDOSCOPY    Ir cholecystostomy  2020    Laparoscopic cholecystectomy  2021     Family  History   Problem Relation Age of Onset    Diabetes Father     Hypertension Father     Heart Disease Father         Angina age 70s; CABG age 81    Colon Cancer Father     Other (Lung Cancer) Father     Other (Parkinson's) Mother     Other (Parkinson's) Maternal Aunt     Breast Cancer Paternal Grandmother     Cancer Paternal Grandmother         breast cancer     Social History:  Social History     Socioeconomic History    Marital status: Single   Occupational History    Occupation:    Tobacco Use    Smoking status: Never    Smokeless tobacco: Never   Vaping Use    Vaping status: Never Used   Substance and Sexual Activity    Alcohol use: Yes     Alcohol/week: 8.0 standard drinks of alcohol     Types: 4 Cans of beer, 4 Shots of liquor per week     Comment: social- last yesterday    Drug use: No    Sexual activity: Not Currently     Partners: Female   Other Topics Concern    Caffeine Concern Yes     Comment: coffee, 2cups/day     Allergies/Medications:   Allergies:   Allergies   Allergen Reactions    Amoxicillin HIVES    Radiology Contrast Iodinated Dyes HIVES and JITTERY     (Not in a hospital admission)      Review of Systems:   Review of Systems    Physical Exam:   Vital Signs:   weight is 230 lb (104.3 kg). His temporal temperature is 98 °F (36.7 °C). His blood pressure is 162/92 (abnormal) and his pulse is 62. His respiration is 17 and oxygen saturation is 97%.   Physical Exam    Results:     Lab Results   Component Value Date    WBC 14.5 (H) 07/06/2024    HGB 17.7 (H) 07/06/2024    HCT 48.7 07/06/2024    .0 07/06/2024    CREATSERUM 0.90 07/06/2024    BUN 14 07/06/2024     07/06/2024    K 4.1 07/06/2024     07/06/2024    CO2 27.0 07/06/2024     (H) 07/06/2024    CA 9.5 07/06/2024    ALB 4.7 07/06/2024    ALKPHO 89 07/06/2024    BILT 1.1 07/06/2024    TP 7.8 07/06/2024    AST 30 07/06/2024    ALT 50 (H) 07/06/2024    INR 1.07 01/05/2021    TSH 2.03 01/24/2014    LIP 71  07/06/2024    PSA 0.7 10/31/2017    ESRML 7 08/15/2018    CRP 9.3 (H) 08/15/2018    MG 2.2 12/22/2020    TROPHS 3 04/13/2024    ETOH <3 04/13/2024     No results found.        Impression:     Acute appendicitis with localized peritonitis, without perforation, abscess, or gangrene             Recommendations:  IV abx, NPO p MN. Laparoscopic appendectomy in am.       Anastasiya Sutton MD  7/6/2024

## 2024-07-07 NOTE — PROGRESS NOTES
[facility]  part of [facility]    Progress Note    Alejandro Lezama Patient Status:  Inpatient    1960 MRN [id number]   Location [facility] 4W/SW/SE Attending Oren Gonzalez MD   Hosp Day # 1 PCP Alejandro Humphries MD       Subjective:     Seen in PACU.  Pain controlled.    Objective:   Blood pressure 132/75, pulse 88, temperature 98.6 °F (37 °C), temperature source Oral, resp. rate 18, weight 223 lb 12.8 oz (101.5 kg), SpO2 97%.    Gen:   NAD.  A and O x 3  CV:   RRR, no m/g/r  Pulm:   CTA bilat  Abd:   +bs, soft, tender as expected after surgery, wounds c/d/i, ND  LE:   No c/c/e  Neuro:   nonfocal    Results:     Lab Results   Component Value Date    WBC 13.8 (H) 2024    HGB 16.6 2024    HCT 44.5 2024    .0 2024    CREATSERUM 0.90 2024    BUN 14 2024     2024    K 4.1 2024     2024    CO2 27.0 2024     (H) 2024    CA 9.5 2024    ALB 4.7 2024    ALKPHO 89 2024    BILT 1.1 2024    TP 7.8 2024    AST 30 2024    ALT 50 (H) 2024    INR 0.97 2024    TSH 2.03 2014    LIP 71 2024    PSA 0.7 10/31/2017    ESRML 7 08/15/2018    CRP 9.3 (H) 08/15/2018    MG 2.2 2020    ETOH <3 2024       CT ABDOMEN+PELVIS(CONTRAST ONLY)(CPT=74177)    Result Date: 2024  CONCLUSION:   Acute appendicitis with appendicolith at the base.  Recommend surgical consultation.  Extensive sigmoid colon diverticulosis.  Associated diffuse wall thickening of the sigmoid colon is noted.  Chronic diverticulitis is a possibility in the appropriate clinical setting.  Recommend colonoscopy if not recently performed, to exclude underlying lesion.  Nondilated fluid-filled small bowel loops in the lower abdomen/pelvis, thought to represent a reactive ileus, given the acute appendicitis/inflammatory change in the right lower quadrant.  Diffuse hepatic  steatosis.    A preliminary report was issued by the [facility] Radiology teleradiology service. There are no clinically actionable discrepancies.  Preliminary finding were discussed with Dr. Cline by  On 07/06/2024 at 10:53 p.m. Eastern time  Dictated by (CST): Katherine Angulo MD on 7/07/2024 at 6:21 AM     Finalized by (CST): Katherine Angulo MD on 7/07/2024 at 6:29 AM               Assessment and Plan:     Acute appendicitis  POD #0 s/p lap appy  - mobilize  - adat as per surgery  - wean ivf  - pain control  - abx    Alcohol use   - Denies history of alcohol withdrawal.  Monitor     HTN  HLD  -Home medications  -As needed IV BP control     dvt proph:    SCDs    Code status:    Full       MDM:    High Oren Khan MD  7/7/2024

## 2024-07-07 NOTE — ED QUICK NOTES
Orders for admission, patient is aware of plan and ready to go upstairs. Any questions, please call ED RN Elana at extension [phone].     Patient Covid vaccination status: Fully vaccinated     COVID Test Ordered in ED: None    COVID Suspicion at Admission: N/A    Running Infusions:  None    Mental Status/LOC at time of transport: A&Ox4    Other pertinent information: From home  CIWA score: N/A   NIH score:  N/A

## 2024-07-07 NOTE — PLAN OF CARE
Pt had lap appy this morning with Dr. Sutton. X3 lap sites covered in gauze and tegaderm. Ice pack in abdomen. CIWA, no symptoms noted. Started on general diet. Norco and morphine given for pain. Plan for home when cleared.    Problem: PAIN - ADULT  Goal: Verbalizes/displays adequate comfort level or patient's stated pain goal  Description: INTERVENTIONS:  - Encourage pt to monitor pain and request assistance  - Assess pain using appropriate pain scale  - Administer analgesics based on type and severity of pain and evaluate response  - Implement non-pharmacological measures as appropriate and evaluate response  - Consider cultural and social influences on pain and pain management  - Manage/alleviate anxiety  - Utilize distraction and/or relaxation techniques  - Monitor for opioid side effects  - Notify MD/LIP if interventions unsuccessful or patient reports new pain  - Anticipate increased pain with activity and pre-medicate as appropriate  Outcome: Progressing     Problem: RISK FOR INFECTION - ADULT  Goal: Absence of fever/infection during anticipated neutropenic period  Description: INTERVENTIONS  - Monitor WBC  - Administer growth factors as ordered  - Implement neutropenic guidelines  Outcome: Progressing     Problem: SAFETY ADULT - FALL  Goal: Free from fall injury  Description: INTERVENTIONS:  - Assess pt frequently for physical needs  - Identify cognitive and physical deficits and behaviors that affect risk of falls.  - [place] fall precautions as indicated by assessment.  - Educate pt/family on patient safety including physical limitations  - Instruct pt to call for assistance with activity based on assessment  - Modify environment to reduce risk of injury  - Provide assistive devices as appropriate  - Consider OT/PT consult to assist with strengthening/mobility  - Encourage toileting schedule  Outcome: Progressing     Problem: DISCHARGE PLANNING  Goal: Discharge to home or other facility with appropriate  resources  Description: INTERVENTIONS:  - Identify barriers to discharge w/pt and caregiver  - Include patient/family/discharge partner in discharge planning  - Arrange for needed discharge resources and transportation as appropriate  - Identify discharge learning needs (meds, wound care, etc)  - Arrange for interpreters to assist at discharge as needed  - Consider post-discharge preferences of patient/family/discharge partner  - Complete POLST form as appropriate  - Assess patient's ability to be responsible for managing their own health  - Refer to Case Management Department for coordinating discharge planning if the patient needs post-hospital services based on physician/LIP order or complex needs related to functional status, cognitive ability or social support system  Outcome: Progressing

## 2024-07-07 NOTE — ANESTHESIA PREPROCEDURE EVALUATION
Anesthesia PreOp Note    HPI:     Alejandro Lezama is a 64 year old male who presents for preoperative consultation requested by: Anastasiya Sutton MD    Date of Surgery: 7/6/2024 - 7/7/2024    Procedure(s):  LAPAROSCOPIC APPENDECTOMY, POSSIBLE OPEN  Indication: Appendicitis [K37]    Relevant Problems   No relevant active problems       NPO:  Last Liquid Consumption Date: 07/07/24  Last Liquid Consumption Time: 0000  Last Solid Consumption Date: 07/07/24  Last Solid Consumption Time: 0000  Last Liquid Consumption Date: 07/07/24          History Review:  Patient Active Problem List    Diagnosis Date Noted    Acute appendicitis with localized peritonitis, without perforation, abscess, or gangrene 07/06/2024    Fever 12/20/2020    Fever, unspecified fever cause 12/20/2020    Hepatitis 12/20/2020    Acute cholecystitis 12/2020    Aortic atherosclerosis (HCC)     Elevated LFTs     Generalized abdominal pain     Alcohol-induced acute pancreatitis (HCC) 11/05/2020    Alcohol-induced acute pancreatitis, unspecified complication status (HCC) 11/05/2020    History of acute pancreatitis 11/2020    Hepatic steatosis     Gastrointestinal hemorrhage 01/2019    Essential hypertension 11/30/2016    Duodenal ulcer with hemorrhage 07/27/2015    GERD (gastroesophageal reflux disease) 07/27/2015    Colon polyps 07/27/2015    Acute diverticulitis 07/27/2015    Dyslipidemia 07/27/2015       Past Medical History:    Acute cholecystitis    Acute cholecystitis with E. coli bacteremia Rx cholecystostomy tube; s/p laparoscopic cholecystectomy 1-21    Acute diverticulitis    Aortic atherosclerosis (HCC)    CXR 11-20    Colon polyps    Duodenal ulcer with hemorrhage    EGD with H. pylori negative    Dyslipidemia    Essential hypertension    Gastrointestinal hemorrhage    Melena; EGD with small hiatal hernia, gastric polyps, mild gastritis, biopsies benign, H. pylori negative; colonoscopy with diverticulosis and internal hemorrhoids     GERD (gastroesophageal reflux disease)    Hepatic steatosis    Ultrasound 1-19    High blood pressure    History of acute pancreatitis    Secondary to alcohol    Lyme disease    Erythema migrans, treated    Visual impairment    glasses       Past Surgical History:   Procedure Laterality Date    Colonoscopy  2010 colon; Dr. Whitley    Colonoscopy N/A 2018    Procedure: COLONOSCOPY;  Surgeon: Brian Whitley MD;  Location: [facility] ENDOSCOPY    Colonoscopy N/A 2019    Procedure: COLONOSCOPY;  Surgeon: Brian Whitley MD;  Location: [facility] ENDOSCOPY    Colonoscopy N/A 2024    MD Gutierrez, colonoscopy with polyp Bx x 2, diverticulosis    Colonoscopy N/A 2024    Procedure: COLONOSCOPYn with polyp biopsy;  Surgeon: Brian Whitley MD;  Location: [facility] ENDOSCOPY    Ir cholecystostomy  2020    Laparoscopic cholecystectomy  2021       Medications Prior to Admission   Medication Sig Dispense Refill Last Dose    lisinopril-hydroCHLOROthiazide 20-12.5 MG Oral Tab Take 1 tablet by mouth daily. 90 tablet 1 2024    Multiple Vitamin (MULTI VITAMIN MENS) Oral Tab Take 1 tablet by mouth daily.   2024    omega-3 fatty acids 1000 MG Oral Cap Take 1,000 mg by mouth daily. Take 3 pills daily   Past Week    hydrocortisone 2.5 % External Cream Place 1 Dose rectally 2 (two) times daily. Apply by topical route 2 times every day to the affected area(s) 28 g 0     [] cyclobenzaprine 10 MG Oral Tab Take 1 tablet (10 mg total) by mouth 3 (three) times daily as needed for Muscle spasms. (Patient not taking: Reported on 2024) 30 tablet 0      Current Facility-Administered Medications Ordered in Epic   Medication Dose Route Frequency Provider Last Rate Last Admin    cefTRIAXone (Rocephin) 1 g in sodium chloride 0.9% 100 mL IVPB-ADDV  1 g Intravenous Q24H Teodora Akins MD        metRONIDAZOLE in sodium chloride 0.79% (Flagyl) 5 mg/mL IVPB premix 500 mg  500 mg Intravenous Q12H Mable  MD Teodora        dextrose 5%-sodium chloride 0.9% infusion   Intravenous Continuous Teodora Akins  mL/hr at 07/07/24 0737 New Bag at 07/07/24 0737    acetaminophen (Tylenol Extra Strength) tab 500 mg  500 mg Oral Q4H PRN Teodora Akins MD        morphINE PF 2 MG/ML injection 1 mg  1 mg Intravenous Q2H PRN Teodora Akins MD        Or    morphINE PF 2 MG/ML injection 2 mg  2 mg Intravenous Q2H PRN Teodora Akins MD        Or    morphINE PF 4 MG/ML injection 4 mg  4 mg Intravenous Q2H PRN Teodora Akins MD   4 mg at 07/06/24 2334    ondansetron (Zofran) 4 MG/2ML injection 4 mg  4 mg Intravenous Q6H PRN Teodora Akins MD        metoclopramide (Reglan) 5 mg/mL injection 10 mg  10 mg Intravenous Q8H PRN Teodora Akins MD        pantoprazole (Protonix) 40 mg in sodium chloride 0.9% PF 10 mL IV push  40 mg Intravenous Daily Teodora Akins MD   40 mg at 07/07/24 0734    LORazepam (Ativan) tab 1 mg  1 mg Oral Q1H PRN Teodora Akins MD        Or    LORazepam (Ativan) tab 2 mg  2 mg Oral Q1H PRN Teodora Akins MD        thiamine (Vitamin B1) tab 100 mg  100 mg Oral Daily Teodora Akins MD        multivitamin with minerals (Thera M Plus) tab 1 tablet  1 tablet Oral Daily Teodora Akins MD        folic acid (Folvite) tab 1 mg  1 mg Oral Daily Teodora Akins MD        hydrALAzine (Apresoline) 20 mg/mL injection 10 mg  10 mg Intravenous Q6H PRN Teodora Akins MD        lisinopril (Prinivil; Zestril) tab 20 mg  20 mg Oral Daily Teodora Akins MD        And    hydroCHLOROthiazide tab 12.5 mg  12.5 mg Oral Daily Teodora Akins MD         No current Epic-ordered outpatient medications on file.       Allergies   Allergen Reactions    Amoxicillin HIVES    Radiology Contrast Iodinated Dyes HIVES and JITTERY       Family History   Problem Relation Age of Onset    Diabetes Father     Hypertension Father     Heart Disease Father         Angina age 70s; CABG age 81     Colon Cancer Father     Other (Lung Cancer) Father     Other (Parkinson's) Mother     Other (Parkinson's) Maternal Aunt     Breast Cancer Paternal Grandmother     Cancer Paternal Grandmother         breast cancer     Social History     Socioeconomic History    Marital status: Single   Occupational History    Occupation:    Tobacco Use    Smoking status: Never    Smokeless tobacco: Never   Vaping Use    Vaping status: Never Used   Substance and Sexual Activity    Alcohol use: Yes     Alcohol/week: 8.0 standard drinks of alcohol     Types: 4 Cans of beer, 4 Shots of liquor per week     Comment: social- last yesterday    Drug use: No    Sexual activity: Not Currently     Partners: Female   Other Topics Concern    Caffeine Concern Yes     Comment: coffee, 2cups/day       Available pre-op labs reviewed.  Lab Results   Component Value Date    WBC 13.8 (H) 07/07/2024    RBC 4.93 07/07/2024    HGB 16.6 07/07/2024    HCT 44.5 07/07/2024    MCV 90.3 07/07/2024    MCH 33.7 07/07/2024    MCHC 37.3 (H) 07/07/2024    RDW 11.3 07/07/2024    .0 07/07/2024     Lab Results   Component Value Date     07/06/2024    K 4.1 07/06/2024     07/06/2024    CO2 27.0 07/06/2024    BUN 14 07/06/2024    CREATSERUM 0.90 07/06/2024     (H) 07/06/2024    CA 9.5 07/06/2024     Lab Results   Component Value Date    INR 0.97 07/07/2024       Vital Signs:  Body mass index is 30.35 kg/m².   weight is 101.5 kg (223 lb 12.8 oz). His oral temperature is 98.4 °F (36.9 °C). His blood pressure is 133/77 and his pulse is 84. His respiration is 16 and oxygen saturation is 97%.   Vitals:    07/07/24 0300 07/07/24 0400 07/07/24 0600 07/07/24 0800   BP:  103/63 120/78 133/77   Pulse: 82 61  84   Resp:  18 16 16   Temp:  97.7 °F (36.5 °C) 98 °F (36.7 °C) 98.4 °F (36.9 °C)   TempSrc:  Oral Oral Oral   SpO2:  96% 98% 97%   Weight:            Anesthesia Evaluation     Patient summary reviewed and Nursing notes reviewed    No  history of anesthetic complications   Airway   Mallampati: II  TM distance: >3 FB  Neck ROM: full  Dental - Dentition appears grossly intact     Pulmonary - negative ROS and normal exam   Cardiovascular - normal exam  Exercise tolerance: good  (+) hypertension well controlled    ROS comment: hyperlipidemia    Neuro/Psych - negative ROS     GI/Hepatic/Renal    (+) GERD well controlled, PUD, liver disease    Endo/Other - negative ROS   Abdominal  - normal exam                 Anesthesia Plan:   ASA:  3  Emergent    Plan:   General  Airway:  ETT  Post-op Pain Management: IV analgesics and Oral pain medication  Informed Consent Plan and Risks Discussed With:  Patient      I have informed Alejandro Lezama of the nature of the anesthetic plan, benefits, risks including possible dental damage if relevant, major complications, and any alternative forms of anesthetic management.   All of the patient's questions were answered to the best of my ability. The patient desires the anesthetic management as planned.  MIRELA VELÁZQUEZ MD  7/7/2024 8:26 AM  Present on Admission:   Acute appendicitis with localized peritonitis, without perforation, abscess, or gangrene

## 2024-07-07 NOTE — OPERATIVE REPORT
Operative Report    Patient Name:  Alejandro Lezama  MR:  [id number]  :  1960  DOS:  24    Preop Dx:  Appendicitis [K37]  Postop Dx:  Appendicitis [K37]  Procedure:  Laparoscopic appendectomy  Surgeon:  Anastasiya Sutton MD   Surgical Assistant.: Sunshine Koehler CSA  EBL: Blood Output: 10 mL (2024  9:57 AM)    Complication:  None    INDICATION:  Pt is a 64 year old male who with Appendicitis [K37] who is scheduled for a LAPAROSCOPIC APPENDECTOMY..    CONSENT:  An informed consent discussion was held with the patient regarding the nature of acute appendicitis, the treatment options and the details of the procedure.  The risks including but not limited to bleeding, wound infection, intra-abdominal infection, injury to the colon, small intestine, right ureter, incomplete resection, and incisional hernia were discussed.  The patient expressed understanding and wants to proceed with the planned procedure.    TECHNIQUE:  The patient was taken to the OR and placed in supine position.  General anesthesia was established and the abdomen was prepped in standard fashion.  Pneumoperitoneum was obtained using open technique through a 1 cm umbilical incision.  A 12-mm trocar was inserted under direct visualization  without complication. Examination of the abdomen showed an inflamed and dilated appendix consistent with acute non-perforated appendicitis.  Two 5-mm trocars were placed in the LLQ and supra-pubic area.  The patient was placed in Trendelenburg position.  The mesoappendix was divided using the linear stapler. The staple line was complete and hemostatic.  The base of the appendix was divided using a 45 x 3.5 mm linear stapler.  The staple line was complete, intact, and hemostatic. The appendix was delivered from the abdomen using an endocatch bag.  The abdomen was irrigated with saline and found to be hemostatic.  The staple line was hemostatic.  The ports were withdrawn under direct  visualization and no port site bleeding was seen.  The umbilical fascia and the left lower quadrant incision were closed using 0 vicryl.  The skin incisions were closed using 4-0 vicryl.  Marcaine was injected and dermabond applied. Steri strips were applied.  All instrument and sponge counts were correct. The estimated blood loss was less than 15cc.  I was present during the entire procedure.        Anastasiya Sutton MD

## 2024-07-07 NOTE — ANESTHESIA POSTPROCEDURE EVALUATION
Patient: Alejandro Lezama    Procedure Summary       Date: 07/07/24 Room / Location: [facility] MAIN OR  / [facility] MAIN OR    Anesthesia Start: 0910 Anesthesia Stop: 1017    Procedure: LAPAROSCOPIC APPENDECTOMY. (Abdomen) Diagnosis:       Appendicitis      (Appendicitis [K37])    Surgeons: Anastasiya Sutton MD Anesthesiologist: Mirela Hdz MD    Anesthesia Type: general ASA Status: 3 - Emergent            Anesthesia Type: general    Vitals Value Taken Time   /78 07/07/24 1016   Temp 97.4 °F (36.3 °C) 07/07/24 1016   Pulse 91 07/07/24 1017   Resp 17 07/07/24 1017   SpO2 96 % 07/07/24 1017   Vitals shown include unfiled device data.    [facility] AN Post Evaluation:   Patient Evaluated in PACU  Patient Participation: complete - patient participated  Level of Consciousness: awake and alert  Pain Score: 0  Pain Management: adequate  Airway Patency:patent  Yes    Nausea/Vomiting: none  Cardiovascular Status: acceptable  Respiratory Status: acceptable  Postoperative Hydration acceptable      MIRELA HDZ MD  7/7/2024 10:17 AM

## 2024-07-07 NOTE — ED INITIAL ASSESSMENT (HPI)
Patient c/o RLQ abdominal pain since earlier today. Denies N/V/D. Hx of pancreatitis. Denies fevers. Denies urinary issues.

## 2024-07-08 VITALS
RESPIRATION RATE: 16 BRPM | TEMPERATURE: 98 F | HEART RATE: 52 BPM | OXYGEN SATURATION: 100 % | WEIGHT: 223.81 LBS | DIASTOLIC BLOOD PRESSURE: 81 MMHG | BODY MASS INDEX: 30 KG/M2 | SYSTOLIC BLOOD PRESSURE: 140 MMHG

## 2024-07-08 LAB
ANION GAP SERPL CALC-SCNC: 5 MMOL/L (ref 0–18)
BASOPHILS # BLD AUTO: 0.02 X10(3) UL (ref 0–0.2)
BASOPHILS NFR BLD AUTO: 0.2 %
BUN BLD-MCNC: 11 MG/DL (ref 9–23)
BUN/CREAT SERPL: 14.5 (ref 10–20)
CALCIUM BLD-MCNC: 8.2 MG/DL (ref 8.7–10.4)
CHLORIDE SERPL-SCNC: 109 MMOL/L (ref 98–112)
CO2 SERPL-SCNC: 27 MMOL/L (ref 21–32)
CREAT BLD-MCNC: 0.76 MG/DL
DEPRECATED RDW RBC AUTO: 40.6 FL (ref 35.1–46.3)
EGFRCR SERPLBLD CKD-EPI 2021: 100 ML/MIN/1.73M2 (ref 60–?)
EOSINOPHIL # BLD AUTO: 0.02 X10(3) UL (ref 0–0.7)
EOSINOPHIL NFR BLD AUTO: 0.2 %
ERYTHROCYTE [DISTWIDTH] IN BLOOD BY AUTOMATED COUNT: 11.7 % (ref 11–15)
GLUCOSE BLD-MCNC: 123 MG/DL (ref 70–99)
HCT VFR BLD AUTO: 41.3 %
HGB BLD-MCNC: 14.8 G/DL
IMM GRANULOCYTES # BLD AUTO: 0.08 X10(3) UL (ref 0–1)
IMM GRANULOCYTES NFR BLD: 0.6 %
LYMPHOCYTES # BLD AUTO: 1.39 X10(3) UL (ref 1–4)
LYMPHOCYTES NFR BLD AUTO: 10.5 %
MCH RBC QN AUTO: 33.6 PG (ref 26–34)
MCHC RBC AUTO-ENTMCNC: 35.8 G/DL (ref 31–37)
MCV RBC AUTO: 93.9 FL
MONOCYTES # BLD AUTO: 0.94 X10(3) UL (ref 0.1–1)
MONOCYTES NFR BLD AUTO: 7.1 %
NEUTROPHILS # BLD AUTO: 10.85 X10 (3) UL (ref 1.5–7.7)
NEUTROPHILS # BLD AUTO: 10.85 X10(3) UL (ref 1.5–7.7)
NEUTROPHILS NFR BLD AUTO: 81.4 %
OSMOLALITY SERPL CALC.SUM OF ELEC: 293 MOSM/KG (ref 275–295)
PLATELET # BLD AUTO: 160 10(3)UL (ref 150–450)
POTASSIUM SERPL-SCNC: 4.4 MMOL/L (ref 3.5–5.1)
RBC # BLD AUTO: 4.4 X10(6)UL
SODIUM SERPL-SCNC: 141 MMOL/L (ref 136–145)
WBC # BLD AUTO: 13.3 X10(3) UL (ref 4–11)

## 2024-07-08 PROCEDURE — 99239 HOSP IP/OBS DSCHRG MGMT >30: CPT | Performed by: HOSPITALIST

## 2024-07-08 RX ORDER — METRONIDAZOLE 500 MG/1
500 TABLET ORAL EVERY 12 HOURS SCHEDULED
Status: DISCONTINUED | OUTPATIENT
Start: 2024-07-08 | End: 2024-07-08

## 2024-07-08 RX ORDER — HYDROCODONE BITARTRATE AND ACETAMINOPHEN 5; 325 MG/1; MG/1
1 TABLET ORAL EVERY 6 HOURS PRN
Qty: 20 TABLET | Refills: 0 | Status: SHIPPED | OUTPATIENT
Start: 2024-07-08

## 2024-07-08 NOTE — PROGRESS NOTES
[facility]  part of [facility]    Progress Note    Alejandro Lezama Patient Status:  Inpatient    1960 MRN [id number]   Location [facility] 4W/SW/SE Attending Oren Gonzalez MD   Hosp Day # 2 PCP Alejandro Humphries MD       Subjective:   POD1 lap appy. Pt feeling well, pain controlled.    Objective:   Vital Signs:  Blood pressure 140/81, pulse 52, temperature 97.8 °F (36.6 °C), temperature source Oral, resp. rate 16, weight 223 lb 12.8 oz (101.5 kg), SpO2 100%.    Physical Exam     General: No acute distress. Alert and oriented x 3.  HEENT: Moist mucous membranes. Anicteric.   Neck: Supple, No JVD.   Respiratory: Nonlabored resp.  Cardiovascular: Regular rate.   Abdomen: Soft, expected incisional tenderness, no rebound tnd, no guarding, nondistended.  No masses. Normal bowel sounds. Incisions c/d/i  Neurologic: No focal neurological deficits.  Musculoskeletal: Full range of motion of all extremities. No calf tenderness. No swelling noted.  Integument: No lesions. No erythema.  Psychiatric: Appropriate mood and affect.       Assessment and Plan:     Acute appendicitis with localized peritonitis, without perforation, abscess, or gangrene    Pt recovering well. Stable for discharge home from surgical standpoint. Follow up in 2 weeks for postoperative appointment.     Results:     Lab Results   Component Value Date    WBC 13.3 (H) 2024    HGB 14.8 2024    HCT 41.3 2024    .0 2024    CREATSERUM 0.76 2024    BUN 11 2024     2024    K 4.4 2024     2024    CO2 27.0 2024     (H) 2024    CA 8.2 (L) 2024    ALB 4.7 2024    ALKPHO 89 2024    BILT 1.1 2024    TP 7.8 2024    AST 30 2024    ALT 50 (H) 2024    INR 0.97 2024    TSH 2.03 2014    LIP 71 2024    PSA 0.7 10/31/2017    ESRML 7 08/15/2018    CRP 9.3 (H) 08/15/2018    MG 2.2  12/22/2020    ETOH <3 04/13/2024       CT ABDOMEN+PELVIS(CONTRAST ONLY)(CPT=74177)    Result Date: 7/7/2024  CONCLUSION:   Acute appendicitis with appendicolith at the base.  Recommend surgical consultation.  Extensive sigmoid colon diverticulosis.  Associated diffuse wall thickening of the sigmoid colon is noted.  Chronic diverticulitis is a possibility in the appropriate clinical setting.  Recommend colonoscopy if not recently performed, to exclude underlying lesion.  Nondilated fluid-filled small bowel loops in the lower abdomen/pelvis, thought to represent a reactive ileus, given the acute appendicitis/inflammatory change in the right lower quadrant.  Diffuse hepatic steatosis.    A preliminary report was issued by the [facility] teleradiology service. There are no clinically actionable discrepancies.  Preliminary finding were discussed with Dr. Cline by  On 07/06/2024 at 10:53 p.m. Eastern time  Dictated by (CST): Katherine Angulo MD on 7/07/2024 at 6:21 AM     Finalized by (CST): Katherine Angulo MD on 7/07/2024 at 6:29 AM                       Angelito Turcios PA-C  7/8/2024

## 2024-07-08 NOTE — DISCHARGE SUMMARY
[facility] [place]  part of [facility]    Discharge Summary    Alejandro Lezama Patient Status:  Inpatient    1960 MRN [id number]   Location Health system 4W/SW/SE Attending Oren Gonzalez MD   Hosp Day # 2 PCP Alejandro Humphries MD     Date of Admission: 2024 Disposition:   Home or Self Care     Date of Discharge:  2024     Admitting Diagnosis:   Acute appendicitis with localized peritonitis, without perforation, abscess, or gangrene [K35.30]    Hospital Discharge Diagnoses:    Acute appendicitis  S/p lap appy 24 by Dr. Sutton  Alcohol use   HTN  HLD      Problem List:     Patient Active Problem List   Diagnosis    Duodenal ulcer with hemorrhage    GERD (gastroesophageal reflux disease)    Colon polyps    Acute diverticulitis    Dyslipidemia    Essential hypertension    Hepatic steatosis    Alcohol-induced acute pancreatitis (HCC)    Alcohol-induced acute pancreatitis, unspecified complication status (HCC)    Elevated LFTs    Generalized abdominal pain    Aortic atherosclerosis (HCC)    Fever    Fever, unspecified fever cause    Hepatitis    Acute cholecystitis    Gastrointestinal hemorrhage    History of acute pancreatitis    Acute appendicitis with localized peritonitis, without perforation, abscess, or gangrene       Physical Exam:      /81 (BP Location: Right arm)   Pulse 52   Temp 97.8 °F (36.6 °C) (Oral)   Resp 16   Wt 223 lb 12.8 oz (101.5 kg)   SpO2 100%   BMI 30.35 kg/m²     Gen:   NAD.  A and O x 3  CV:   RRR, no m/g/r  Pulm:   CTA bilat  Abd:   +bs, soft, tender as expected after surgery, wounds c/d/i, ND  LE:   No c/c/e  Neuro:   nonfocal      Reason for Admission:   RLQ pain        Subjective:  Alejandro Lezama is a 64 year old male with history of HTN, GI bleed, GERD, diverticulitis, pancreatitis, HLD who presented to the ED with complaints of RLQ pain.  Onset earlier in the day today.  No associated nausea, vomiting, fever or  chills.  Pain is worse in RLQ.  He also has mild epigastric/LUQ discomfort.  No chest pain or shortness of breath.     Per patient, drinks a sixpack daily.  Lipase normal..     Labs within normal except WBC 14.5 and hemoglobin 17.7  Vital stable except for elevated BP  CT shows acute appendicitis     IV antibiotics started.  General surgery consulted.  Patient will be admitted for further treatment.    Hospital Course:     Acute appendicitis  POD #1 s/p lap appy  - mobilize  - Pt tolerated procedure well.  Pain controlled.  Tolerating diet  Discharged to home in good condition.   Rx for norco.  Was given ivf, abx.  Surgery f/u.     Alcohol use   - Denies history of alcohol withdrawal.        HTN  HLD  -cont Home medications     dvt proph:    SCDs     Code status:    Full       Consultations:   General surgery    Discharge Condition:  Good    Discharge Medications:      Discharge Medications        START taking these medications        Instructions Prescription details   HYDROcodone-acetaminophen 5-325 MG Tabs  Commonly known as: Norco      Take 1 tablet by mouth every 6 (six) hours as needed.   Quantity: 20 tablet  Refills: 0            CONTINUE taking these medications        Instructions Prescription details   hydrocortisone 2.5 % Crea  Commonly known as: Anusol-HC      Place 1 Dose rectally 2 (two) times daily. Apply by topical route 2 times every day to the affected area(s)   Quantity: 28 g  Refills: 0     lisinopril-hydroCHLOROthiazide 20-12.5 MG Tabs  Commonly known as: Zestoretic      Take 1 tablet by mouth daily.   Quantity: 90 tablet  Refills: 1     Multi Vitamin Mens Tabs      Take 1 tablet by mouth daily.   Refills: 0     omega-3 fatty acids 1000 MG Caps  Commonly known as: Fish Oil      Take 1,000 mg by mouth daily. Take 3 pills daily   Refills: 0            STOP taking these medications      cyclobenzaprine 10 MG Tabs  Commonly known as: Flexeril                  Where to Get Your Medications         These medications were sent to [id number] - [place], [place] - [address] AT [facility], [phone], [phone]  [address] [facility] [address]      Phone: [phone]   HYDROcodone-acetaminophen 5-325 MG Tabs         Follow up Visits:     Follow-up Information       Anastasiya Sutton MD. Schedule an appointment as soon as possible for a visit in 2 week(s).    Specialty: SURGERY, GENERAL  Contact information:  [facility] [address] [address] [phone]                             Hospital Discharge Diagnoses:  Acute appendicitis    Lace+ Score: 68  59-90 High Risk  29-58 Medium Risk  0-28   Low Risk.    TCM Follow-Up Recommendation:  LACE > 58: High Risk of readmission after discharge from the hospital.    >35 minutes spent preparing this discharge.    Oren Khan MD  7/8/2024  12:25 PM

## 2024-07-08 NOTE — PLAN OF CARE
Patient is alert and oriented x4. Patient is on room air and remote tele. VSS. Patient is on IVF and IV antibiotics. Patient is on a general diet. Patient denies n/v/d and sob. Patient reported severe pain, prn ivp morphine 4mg x1 and prn po norco 5-325mg x1 were administered and were effective. Patient is voiding freely and last had a bm 7/6/24. Patient has three lap sites, dressings are clean, dry and intact. Safety measures are in place.     Problem: Patient Centered Care  Goal: Patient preferences are identified and integrated in the patient's plan of care  Description: Interventions:  - What would you like us to know as we care for you? I am from home.   - Provide timely, complete, and accurate information to patient/family  - Incorporate patient and family knowledge, values, beliefs, and cultural backgrounds into the planning and delivery of care  - Encourage patient/family to participate in care and decision-making at the level they choose  - Honor patient and family perspectives and choices  Outcome: Progressing     Problem: Patient/Family Goals  Goal: Patient/Family Long Term Goal  Description: Patient's Long Term Goal: Discharge home     Interventions:  - Monitor labs  - Monitor vitals  - Monitor lap sites   - IVF  - IV antibiotics  - Manage pain   - CIWA  - General surgery on consult   - See additional Care Plan goals for specific interventions  Outcome: Progressing  Goal: Patient/Family Short Term Goal  Description: Patient's Short Term Goal: Manage pain     Interventions:   - Frequent pain assessments  - Non-pharmacological interventions  - Pain medications as needed/indicated  - See additional Care Plan goals for specific interventions  Outcome: Progressing     Problem: PAIN - ADULT  Goal: Verbalizes/displays adequate comfort level or patient's stated pain goal  Description: INTERVENTIONS:  - Encourage pt to monitor pain and request assistance  - Assess pain using appropriate pain scale  - Administer  analgesics based on type and severity of pain and evaluate response  - Implement non-pharmacological measures as appropriate and evaluate response  - Consider cultural and social influences on pain and pain management  - Manage/alleviate anxiety  - Utilize distraction and/or relaxation techniques  - Monitor for opioid side effects  - Notify MD/LIP if interventions unsuccessful or patient reports new pain  - Anticipate increased pain with activity and pre-medicate as appropriate  Outcome: Progressing     Problem: RISK FOR INFECTION - ADULT  Goal: Absence of fever/infection during anticipated neutropenic period  Description: INTERVENTIONS  - Monitor WBC  - Administer growth factors as ordered  - Implement neutropenic guidelines  Outcome: Progressing     Problem: SAFETY ADULT - FALL  Goal: Free from fall injury  Description: INTERVENTIONS:  - Assess pt frequently for physical needs  - Identify cognitive and physical deficits and behaviors that affect risk of falls.  - [place] fall precautions as indicated by assessment.  - Educate pt/family on patient safety including physical limitations  - Instruct pt to call for assistance with activity based on assessment  - Modify environment to reduce risk of injury  - Provide assistive devices as appropriate  - Consider OT/PT consult to assist with strengthening/mobility  - Encourage toileting schedule  Outcome: Progressing     Problem: DISCHARGE PLANNING  Goal: Discharge to home or other facility with appropriate resources  Description: INTERVENTIONS:  - Identify barriers to discharge w/pt and caregiver  - Include patient/family/discharge partner in discharge planning  - Arrange for needed discharge resources and transportation as appropriate  - Identify discharge learning needs (meds, wound care, etc)  - Arrange for interpreters to assist at discharge as needed  - Consider post-discharge preferences of patient/family/discharge partner  - Complete POLST form as appropriate  -  Assess patient's ability to be responsible for managing their own health  - Refer to Case Management Department for coordinating discharge planning if the patient needs post-hospital services based on physician/LIP order or complex needs related to functional status, cognitive ability or social support system  Outcome: Progressing     Problem: SKIN/TISSUE INTEGRITY - ADULT  Goal: Incision(s), wounds(s) or drain site(s) healing without S/S of infection  Description: INTERVENTIONS:  - Assess and document risk factors for pressure ulcer development  - Assess and document skin integrity  - Assess and document dressing/incision, wound bed, drain sites and surrounding tissue  - Implement wound care per orders  - Initiate isolation precautions as appropriate  - Initiate Pressure Ulcer prevention bundle as indicated  Outcome: Progressing

## 2024-07-08 NOTE — PLAN OF CARE
Patient is alert and oriented. Pain tolerable per patient, worse with movement. Remote tele in place. Endorses passing gas. Abdomen soft and rounded. Surgical sites clean, dry and intact. Tolerating diet, denies nausea. Safety precautions in place. Discharge education given and patient discharged home.     Problem: Patient Centered Care  Goal: Patient preferences are identified and integrated in the patient's plan of care  Description: Interventions:  - What would you like us to know as we care for you? I am from home.   - Provide timely, complete, and accurate information to patient/family  - Incorporate patient and family knowledge, values, beliefs, and cultural backgrounds into the planning and delivery of care  - Encourage patient/family to participate in care and decision-making at the level they choose  - Honor patient and family perspectives and choices  Outcome: Progressing     Problem: Patient/Family Goals  Goal: Patient/Family Long Term Goal  Description: Patient's Long Term Goal: Discharge home     Interventions:  - Monitor labs  - Monitor vitals  - Monitor lap sites   - IVF  - IV antibiotics  - Manage pain   - CIWA  - General surgery on consult   - See additional Care Plan goals for specific interventions  Outcome: Progressing  Goal: Patient/Family Short Term Goal  Description: Patient's Short Term Goal: Manage pain     Interventions:   - Frequent pain assessments  - Non-pharmacological interventions  - Pain medications as needed/indicated  - See additional Care Plan goals for specific interventions  Outcome: Progressing     Problem: PAIN - ADULT  Goal: Verbalizes/displays adequate comfort level or patient's stated pain goal  Description: INTERVENTIONS:  - Encourage pt to monitor pain and request assistance  - Assess pain using appropriate pain scale  - Administer analgesics based on type and severity of pain and evaluate response  - Implement non-pharmacological measures as appropriate and evaluate  response  - Consider cultural and social influences on pain and pain management  - Manage/alleviate anxiety  - Utilize distraction and/or relaxation techniques  - Monitor for opioid side effects  - Notify MD/LIP if interventions unsuccessful or patient reports new pain  - Anticipate increased pain with activity and pre-medicate as appropriate  Outcome: Progressing     Problem: RISK FOR INFECTION - ADULT  Goal: Absence of fever/infection during anticipated neutropenic period  Description: INTERVENTIONS  - Monitor WBC  - Administer growth factors as ordered  - Implement neutropenic guidelines  Outcome: Progressing     Problem: SAFETY ADULT - FALL  Goal: Free from fall injury  Description: INTERVENTIONS:  - Assess pt frequently for physical needs  - Identify cognitive and physical deficits and behaviors that affect risk of falls.  - [place] fall precautions as indicated by assessment.  - Educate pt/family on patient safety including physical limitations  - Instruct pt to call for assistance with activity based on assessment  - Modify environment to reduce risk of injury  - Provide assistive devices as appropriate  - Consider OT/PT consult to assist with strengthening/mobility  - Encourage toileting schedule  Outcome: Progressing     Problem: DISCHARGE PLANNING  Goal: Discharge to home or other facility with appropriate resources  Description: INTERVENTIONS:  - Identify barriers to discharge w/pt and caregiver  - Include patient/family/discharge partner in discharge planning  - Arrange for needed discharge resources and transportation as appropriate  - Identify discharge learning needs (meds, wound care, etc)  - Arrange for interpreters to assist at discharge as needed  - Consider post-discharge preferences of patient/family/discharge partner  - Complete POLST form as appropriate  - Assess patient's ability to be responsible for managing their own health  - Refer to Case Management Department for coordinating  discharge planning if the patient needs post-hospital services based on physician/LIP order or complex needs related to functional status, cognitive ability or social support system  Outcome: Progressing     Problem: SKIN/TISSUE INTEGRITY - ADULT  Goal: Incision(s), wounds(s) or drain site(s) healing without S/S of infection  Description: INTERVENTIONS:  - Assess and document risk factors for pressure ulcer development  - Assess and document skin integrity  - Assess and document dressing/incision, wound bed, drain sites and surrounding tissue  - Implement wound care per orders  - Initiate isolation precautions as appropriate  - Initiate Pressure Ulcer prevention bundle as indicated  Outcome: Progressing

## 2024-07-10 ENCOUNTER — PATIENT OUTREACH (OUTPATIENT)
Dept: CASE MANAGEMENT | Age: 64
End: 2024-07-10

## 2024-07-22 ENCOUNTER — OFFICE VISIT (OUTPATIENT)
Dept: SURGERY | Facility: CLINIC | Age: 64
End: 2024-07-22
Payer: MEDICARE

## 2024-07-22 VITALS — BODY MASS INDEX: 30.2 KG/M2 | HEIGHT: 72 IN | WEIGHT: 223 LBS

## 2024-07-22 DIAGNOSIS — Z98.890 POST-OPERATIVE STATE: Primary | ICD-10-CM

## 2024-07-22 PROCEDURE — 99024 POSTOP FOLLOW-UP VISIT: CPT | Performed by: SURGERY

## 2024-07-22 NOTE — PROGRESS NOTES
S:  Alejandro Lezama is a 64 year old male sp Lap Appy 7/7/2024  Doing well, no fevers, no chills, tolerating a general diet, generally normal bowel movements, no calf tenderness nor lower extremity edema, no shortness of breath, no chest pain.       O:  There were no vitals taken for this visit.  GEN:  No acute distress  L: nonlabored respirations, CTA  H: reg rate  Abd:  Soft, NT,ND, incisions C/D/I, no erythema.  Extr: No edema, no calf tenderness, neg Lupe's sign    Path:  Reviewed w pt    Assessment   No diagnosis found.    Doing well sp Lap Appy.  Continue to avoid heavy lifting for another month.  Maintain a healthy diet.  Maintain good hydration.  F/u prn.         Anastasiya Sutton MD

## 2024-07-29 NOTE — PROGRESS NOTES
[employer] message sent to pt for TCM.      Detail Level: Detailed Depth Of Biopsy: dermis Was A Bandage Applied: Yes Size Of Lesion In Cm: 0.5 Biopsy Type: H and E Biopsy Method: Dermablade Anesthesia Type: 1% lidocaine without epinephrine Additional Anesthesia Volume In Cc (Will Not Render If 0): 0 Hemostasis: Merry's Wound Care: Petrolatum Dressing: bandage Destruction After The Procedure: No Type Of Destruction Used: Curettage Curettage Text: The wound bed was treated with curettage after the biopsy was performed. Cryotherapy Text: The wound bed was treated with cryotherapy after the biopsy was performed. Electrodesiccation Text: The wound bed was treated with electrodesiccation after the biopsy was performed. Electrodesiccation And Curettage Text: The wound bed was treated with electrodesiccation and curettage after the biopsy was performed. Silver Nitrate Text: The wound bed was treated with silver nitrate after the biopsy was performed. Lab: [id number] Lab Facility: [id number] Consent: Written consent was obtained and risks were reviewed including but not limited to scarring, infection, bleeding, scabbing, incomplete removal, nerve damage and allergy to anesthesia. Post-Care Instructions: I reviewed with the patient in detail post-care instructions. Patient is to keep the biopsy site dry overnight, and then apply bacitracin twice daily until healed. Patient may apply hydrogen peroxide soaks to remove any crusting. Notification Instructions: Patient will be notified of biopsy results. However, patient instructed to call the office if not contacted within 2 weeks. Billing Type: Third-Party Bill Information: Selecting Yes will display possible errors in your note based on the variables you have selected. This validation is only offered as a suggestion for you. PLEASE NOTE THAT THE VALIDATION TEXT WILL BE REMOVED WHEN YOU FINALIZE YOUR NOTE. IF YOU WANT TO FAX A PRELIMINARY NOTE YOU WILL NEED TO TOGGLE THIS TO 'NO' IF YOU DO NOT WANT IT IN YOUR FAXED NOTE.

## 2024-10-04 RX ORDER — LISINOPRIL AND HYDROCHLOROTHIAZIDE 12.5; 2 MG/1; MG/1
1 TABLET ORAL DAILY
Qty: 90 TABLET | Refills: 1 | Status: SHIPPED | OUTPATIENT
Start: 2024-10-04

## 2024-10-04 NOTE — TELEPHONE ENCOUNTER
Please review. Protocol Failed; No Protocol    Requested Prescriptions   Pending Prescriptions Disp Refills    LISINOPRIL-HYDROCHLOROTHIAZIDE 20-12.5 MG Oral Tab [Pharmacy Med Name: LISINOPRIL-HCTZ 20/12.5MG TABLETS] 90 tablet 1     Sig: Take 1 tablet by mouth daily.       Hypertension Medications Protocol Failed - 10/2/2024  7:00 AM        Failed - Last BP reading less than 140/90     BP Readings from Last 1 Encounters:   07/08/24 140/81               Passed - CMP or BMP in past 12 months        Passed - In person appointment or virtual visit in the past 12 mos or appointment in next 3 mos     Recent Outpatient Visits              [facility]    [facility] Visit    4 months ago Upper back pain on right side    [facility] Den[facility] Carrillo Michael, MD    Office Visit    5 months ago Annual physical exam    [facility]    Office Visit    1 year ago Annual physical [facility]    [facility]    2 [facility]                      Passed - EGFRCR or GFRNAA > 50     GFR Evaluation  EGFRCR: 100 , resulted on 7/8/2024                   Recent Outpatient Visits              [facility]    Office Visit    4 months ago Upper back pain on right side    [facility]    Office Visit    5 months ago Annual physical exam    [facility]    [facility] Visit    1 year ago Annual physical exam    [facility] [place],  MD Alejandro    Office Visit    2 years ago Robert    [facility], [place] Alejandro Humphries MD    Office Visit

## 2025-02-04 ENCOUNTER — OFFICE VISIT (OUTPATIENT)
Dept: INTERNAL MEDICINE CLINIC | Facility: CLINIC | Age: 65
End: 2025-02-04
Payer: MEDICARE

## 2025-02-04 VITALS
OXYGEN SATURATION: 99 % | DIASTOLIC BLOOD PRESSURE: 89 MMHG | HEART RATE: 71 BPM | WEIGHT: 233 LBS | SYSTOLIC BLOOD PRESSURE: 154 MMHG | HEIGHT: 72 IN | BODY MASS INDEX: 31.56 KG/M2

## 2025-02-04 DIAGNOSIS — K42.9 UMBILICAL HERNIA WITHOUT OBSTRUCTION AND WITHOUT GANGRENE: ICD-10-CM

## 2025-02-04 DIAGNOSIS — E78.5 DYSLIPIDEMIA: ICD-10-CM

## 2025-02-04 DIAGNOSIS — I10 ESSENTIAL HYPERTENSION: Primary | ICD-10-CM

## 2025-02-04 PROCEDURE — 99214 OFFICE O/P EST MOD 30 MIN: CPT | Performed by: INTERNAL MEDICINE

## 2025-02-04 RX ORDER — LISINOPRIL AND HYDROCHLOROTHIAZIDE 20; 25 MG/1; MG/1
1 TABLET ORAL DAILY
Qty: 90 TABLET | Refills: 3 | Status: SHIPPED | OUTPATIENT
Start: 2025-02-04

## 2025-02-04 NOTE — PROGRESS NOTES
Alejandro Lezama is a 64 year old male with complaints of:  Chief Complaint: Establish Care    HPI     Alejandro Lezama is a(n) 64 year old male with a history of hypertension, hyperlipidemia, GERD hepatic steatosis, who presents for follow-up and establish care.     Patient history of hypertension.  He continues on most thiazide 20-12.5 milligrams daily.  Blood pressure is 154/89 initially today. BP is \"all over the place\", occasionally high as 160, usually is 140s.     Hypercholesterolemia.  Patient is on any cholesterol reducing medications.    GERD.  Patient does not appear to be on any acid lowering medications.      Has an umbilical hernia, getting bigger.     Had an appendectomy recently, 2024.  Has a history of acute cholecystitis, status post cholecystectomy.  Had a history of acute over reticulitis.  Did also have a history of a GI bleed.      Past Medical History     Past Medical History:    Acute cholecystitis    Acute cholecystitis with E. coli bacteremia Rx cholecystostomy tube; s/p laparoscopic cholecystectomy 1-21    Acute diverticulitis    Aortic atherosclerosis    CXR 11-20    Colon polyps    Duodenal ulcer with hemorrhage    EGD with H. pylori negative    Dyslipidemia    Essential hypertension    Gastrointestinal hemorrhage    Melena; EGD with small hiatal hernia, gastric polyps, mild gastritis, biopsies benign, H. pylori negative; colonoscopy with diverticulosis and internal hemorrhoids    GERD (gastroesophageal reflux disease)    Hepatic steatosis    Ultrasound 1-19    High blood pressure    History of acute pancreatitis    Secondary to alcohol    Lyme disease    Erythema migrans, treated    Visual impairment    glasses        Past Surgical History     Past Surgical History:   Procedure Laterality Date    Colonoscopy  2010    4th colon; Dr. Whitley    Colonoscopy N/A 03/05/2018    Procedure: COLONOSCOPY;  Surgeon: Brian Whitley MD;  Location: [facility] ENDOSCOPY    Colonoscopy  N/A 01/23/2019    Procedure: COLONOSCOPY;  Surgeon: Brian Whitley MD;  Location: [facility] ENDOSCOPY    Colonoscopy N/A 06/20/2024    MD Gutierrez, colonoscopy with polyp Bx x 2, diverticulosis    Colonoscopy N/A 6/20/2024    Procedure: COLONOSCOPYn with polyp biopsy;  Surgeon: Brian Whitley MD;  Location: [facility] ENDOSCOPY    Ir cholecystostomy  12/22/2020    Laparoscopic cholecystectomy  01/2021        Family History     Family History   Problem Relation Age of Onset    Diabetes Father     Hypertension Father     Heart Disease Father         Angina age 70s; CABG age 81    Colon Cancer Father     Other (Lung Cancer) Father     Other (Parkinson's) Mother     Other (Parkinson's) Maternal Aunt     Breast Cancer Paternal Grandmother     Cancer Paternal Grandmother         breast cancer       Social History     Social History     Socioeconomic History    Marital status: Single   Occupational History    Occupation:    Tobacco Use    Smoking status: Never    Smokeless tobacco: Never   Vaping Use    Vaping status: Never Used   Substance and Sexual Activity    Alcohol use: Yes     Alcohol/week: 8.0 standard drinks of alcohol     Types: 4 Cans of beer, 4 Shots of liquor per week     Comment: social- last yesterday    Drug use: No    Sexual activity: Not Currently     Partners: Female   Other Topics Concern    Caffeine Concern Yes     Comment: coffee, 2cups/day     Social Drivers of Health     Food Insecurity: No Food Insecurity (2/4/2025)    NCSS - Food Insecurity     Worried About Running Out of Food in the Last Year: No     Ran Out of Food in the Last Year: No   Transportation Needs: No Transportation Needs (2/4/2025)    NCSS - Transportation     Lack of Transportation: No   Housing Stability: Not At Risk (2/4/2025)    NCSS - Housing/Utilities     Has Housing: Yes     Worried About Losing Housing: No     Unable to Get Utilities: No       Allergies     Allergies[1]    Current Medications     Current Outpatient  Medications   Medication Sig Dispense Refill    lisinopril-hydroCHLOROthiazide 20-12.5 MG Oral Tab Take 1 tablet by mouth daily. 90 tablet 1    Multiple Vitamin (MULTI VITAMIN MENS) Oral Tab Take 1 tablet by mouth daily.      omega-3 fatty acids 1000 MG Oral Cap Take 1,000 mg by mouth daily. Take 3 pills daily      HYDROcodone-acetaminophen 5-325 MG Oral Tab Take 1 tablet by mouth every 6 (six) hours as needed. 20 tablet 0    hydrocortisone 2.5 % External Cream Place 1 Dose rectally 2 (two) times daily. Apply by topical route 2 times every day to the affected area(s) 28 g 0     No current facility-administered medications for this visit.       Review of Systems     GENERAL HEALTH: feels well otherwise  SKIN: denies any unusual skin lesions or rashes  RESPIRATORY: denies shortness of breath with exertion  CARDIOVASCULAR: denies chest pain on exertion  GI: denies abdominal pain and denies heartburn  : denies any burning with urination, urinary frequency or urgency  NEURO: denies headaches, numbness or tingling, mental status changes  PSYCH: denies depressed mood, anxiety  MUSC: denies muscle aches, joint pain    Physical Exam     /89 (BP Location: Left arm, Patient Position: Sitting, Cuff Size: large)   Pulse 71   Ht 6' (1.829 m)   Wt 233 lb (105.7 kg)   SpO2 99%   BMI 31.60 kg/m²     GENERAL: well developed, well nourished,in no apparent distress  SKIN: no rashes,no suspicious lesions  HEENT: atraumatic, normocephalic,ears and throat are clear  NECK: supple,no adenopathy,no bruits  LUNGS: clear to auscultation  CARDIO: RRR without murmur  GI: good BS's, HSM or tenderness. + umbilical hernia, easily reducible   EXTREMITIES: no cyanosis, clubbing or edema    Assessment and Plan     Diagnoses and all orders for this visit:    Essential hypertension.  Increase lisinopril-hydrochlorothiazide to 20-25 mg daily.  -     lisinopril-hydroCHLOROthiazide 20-25 MG Oral Tab; Take 1 tablet by mouth  daily.    Dyslipidemia.  Plan to recheck cholesterol panel at next physical.    Umbilical hernia without obstruction and without gangrene follow-up with general surgery.  -     Surgery Referral - In Network          lisinopril-hydroCHLOROthiazide 20-25 MG Oral Tab Take 1 tablet by mouth daily. 90 tablet 3    Multiple Vitamin (MULTI VITAMIN MENS) Oral Tab Take 1 tablet by mouth daily.      omega-3 fatty acids 1000 MG Oral Cap Take 1,000 mg by mouth daily. Take 3 pills daily         Requested Prescriptions     Signed Prescriptions Disp Refills    lisinopril-hydroCHLOROthiazide 20-25 MG Oral Tab 90 tablet 3     Sig: Take 1 tablet by mouth daily.       No orders of the defined types were placed in this encounter.      No follow-ups on file.    The patient indicates understanding of these issues and agrees to the plan.    Electronically signed by Vicky Aggarwal MD 02/04/25             [1]   Allergies  Allergen Reactions    Amoxicillin HIVES    Radiology Contrast Iodinated Dyes HIVES and JITTERY

## 2025-02-04 NOTE — PATIENT INSTRUCTIONS
Increase lisinopril-hydrochlorothiazide 20-12.5 mg --> 20-25 mg daily.     Follow up with surgery.     Follow up with me for your annual physical after 4/23/2024 and as needed for a  pre-op.

## 2025-02-27 ENCOUNTER — OFFICE VISIT (OUTPATIENT)
Dept: SURGERY | Facility: CLINIC | Age: 65
End: 2025-02-27
Payer: MEDICARE

## 2025-02-27 VITALS
SYSTOLIC BLOOD PRESSURE: 138 MMHG | DIASTOLIC BLOOD PRESSURE: 94 MMHG | HEART RATE: 72 BPM | WEIGHT: 233 LBS | BODY MASS INDEX: 31.56 KG/M2 | HEIGHT: 72 IN

## 2025-02-27 DIAGNOSIS — K43.2 INCISIONAL HERNIA, WITHOUT OBSTRUCTION OR GANGRENE: Primary | ICD-10-CM

## 2025-02-27 PROCEDURE — 99214 OFFICE O/P EST MOD 30 MIN: CPT | Performed by: SURGERY

## 2025-02-27 NOTE — H&P
Chief complaint:   Chief Complaint   Patient presents with    Hernia     Referred by Dr Vicky Aggarwal for hernia.        HPI: Alejandro presents for a long standing ventral hernia. It is in his mid abdomen, has been present for a long time and it is increasing in size. He has no N/V.     Past medical history:   Past Medical History:    Acute cholecystitis    Acute cholecystitis with E. coli bacteremia Rx cholecystostomy tube; s/p laparoscopic cholecystectomy 1-21    Acute diverticulitis    Aortic atherosclerosis    CXR 11-20    Colon polyps    Duodenal ulcer with hemorrhage    EGD with H. pylori negative    Dyslipidemia    Essential hypertension    Gastrointestinal hemorrhage    Melena; EGD with small hiatal hernia, gastric polyps, mild gastritis, biopsies benign, H. pylori negative; colonoscopy with diverticulosis and internal hemorrhoids    GERD (gastroesophageal reflux disease)    Hepatic steatosis    Ultrasound 1-19    High blood pressure    History of acute pancreatitis    Secondary to alcohol    Lyme disease    Erythema migrans, treated    Visual impairment    glasses       Past surgical history:   Past Surgical History:   Procedure Laterality Date    Colonoscopy  2010    4th colon; Dr. Whitley    Colonoscopy N/A 03/05/2018    Procedure: COLONOSCOPY;  Surgeon: Brian Whitley MD;  Location: [facility] ENDOSCOPY    Colonoscopy N/A 01/23/2019    Procedure: COLONOSCOPY;  Surgeon: Brian Whitley MD;  Location: [facility] ENDOSCOPY    Colonoscopy N/A 06/20/2024    MD Gutierrez, colonoscopy with polyp Bx x 2, diverticulosis    Colonoscopy N/A 6/20/2024    Procedure: COLONOSCOPYn with polyp biopsy;  Surgeon: Brian Whitley MD;  Location: [facility] ENDOSCOPY    Ir cholecystostomy  12/22/2020    Laparoscopic cholecystectomy  01/2021       Allergies: Allergies[1]    Medications:   Current Outpatient Medications   Medication Sig Dispense Refill    lisinopril-hydroCHLOROthiazide 20-25 MG Oral Tab Take 1 tablet by mouth daily. 90 tablet 3     Multiple Vitamin (MULTI VITAMIN MENS) Oral Tab Take 1 tablet by mouth daily.      omega-3 fatty acids 1000 MG Oral Cap Take 1,000 mg by mouth daily. Take 3 pills daily         Social history:   Social History     Socioeconomic History    Marital status: Single   Occupational History    Occupation:    Tobacco Use    Smoking status: Never    Smokeless tobacco: Never   Vaping Use    Vaping status: Never Used   Substance and Sexual Activity    Alcohol use: Yes     Alcohol/week: 8.0 standard drinks of alcohol     Types: 4 Cans of beer, 4 Shots of liquor per week     Comment: social- last yesterday    Drug use: No    Sexual activity: Not Currently     Partners: Female        Family history:  Family History   Problem Relation Age of Onset    Diabetes Father     Hypertension Father     Heart Disease Father         Angina age 70s; CABG age 81    Colon Cancer Father     Other (Lung Cancer) Father     Other (Parkinson's) Mother     Other (Parkinson's) Maternal Aunt     Breast Cancer Paternal Grandmother     Cancer Paternal Grandmother         breast cancer        Review of Systems:   GENERAL: feels generally well  SKIN: no ulcerated or worrisome skin lesions  EYES:denies blurred vision or double vision  HEENT: denies new nasal congestion, sinus pain or ST  LUNGS: denies shortness of breath with exertion  CARDIOVASCULAR: denies chest pain on exertion  GI: no hematemesis, no BRBPR, no worsening heartburn  : no dysuria, no blood in urine, no difficulty urinating  MUSCULOSKELETAL: no new musculoskeletal complaints  NEURO: no persistent, recurrent  headaches  PSYCHE:no depression or anxiety  HEMATOLOGIC: no hx of blood dyscrasia  ENDOCRINE: no new endocrine problems  ALL/ASTHMA: no new hx of severe allergy or asthma  BACK: normal, no spinal deformity, no CVA tenderness    Physical examination:     Constitutional: appears well hydrated alert and responsive no acute distress noted  HEENT wnl, anicteric, PERRL,  normocephalic, atraumatic  Neck supple, norm ROM, no JVD  L CTA B  H Reg rate  Abd soft, NT, ND, no masses, mid abdominal hernia, > 5 cm, no HSM.  Extr no c/c/e  Skin intact, no jaundice, no rashes, no lesions  Neuro grossly intact, no focal deficits, no tremors  Back no deformity, no CVA tnd.         Assessment and plan:  Diagnoses and all orders for this visit:    Incisional hernia, without obstruction or gangrene       Plan incisional hernia repair with mesh.   We have discussed the surgical risks, benefits, alternatives, and expected recovery. All of the patient's questions have been answered to his satisfaction.        Anastasiya Sutton MD  2/27/2025  1:47 PM       [1]   Allergies  Allergen Reactions    Amoxicillin HIVES    Radiology Contrast Iodinated Dyes HIVES and JITTERY

## 2025-02-27 NOTE — H&P (VIEW-ONLY)
Chief complaint:   Chief Complaint   Patient presents with    Hernia     Referred by Dr Vicky Aggarwal for hernia.        HPI: Alejandro presents for a long standing ventral hernia. It is in his mid abdomen, has been present for a long time and it is increasing in size. He has no N/V.     Past medical history:   Past Medical History:    Acute cholecystitis    Acute cholecystitis with E. coli bacteremia Rx cholecystostomy tube; s/p laparoscopic cholecystectomy 1-21    Acute diverticulitis    Aortic atherosclerosis    CXR 11-20    Colon polyps    Duodenal ulcer with hemorrhage    EGD with H. pylori negative    Dyslipidemia    Essential hypertension    Gastrointestinal hemorrhage    Melena; EGD with small hiatal hernia, gastric polyps, mild gastritis, biopsies benign, H. pylori negative; colonoscopy with diverticulosis and internal hemorrhoids    GERD (gastroesophageal reflux disease)    Hepatic steatosis    Ultrasound 1-19    High blood pressure    History of acute pancreatitis    Secondary to alcohol    Lyme disease    Erythema migrans, treated    Visual impairment    glasses       Past surgical history:   Past Surgical History:   Procedure Laterality Date    Colonoscopy  2010    4th colon; Dr. Whitley    Colonoscopy N/A 03/05/2018    Procedure: COLONOSCOPY;  Surgeon: Brian Whitley MD;  Location: [facility] ENDOSCOPY    Colonoscopy N/A 01/23/2019    Procedure: COLONOSCOPY;  Surgeon: Brian Whitley MD;  Location: [facility] ENDOSCOPY    Colonoscopy N/A 06/20/2024    MD Gutierrez, colonoscopy with polyp Bx x 2, diverticulosis    Colonoscopy N/A 6/20/2024    Procedure: COLONOSCOPYn with polyp biopsy;  Surgeon: Brian Whitley MD;  Location: [facility] ENDOSCOPY    Ir cholecystostomy  12/22/2020    Laparoscopic cholecystectomy  01/2021       Allergies: Allergies[1]    Medications:   Current Outpatient Medications   Medication Sig Dispense Refill    lisinopril-hydroCHLOROthiazide 20-25 MG Oral Tab Take 1 tablet by mouth daily. 90 tablet 3     Multiple Vitamin (MULTI VITAMIN MENS) Oral Tab Take 1 tablet by mouth daily.      omega-3 fatty acids 1000 MG Oral Cap Take 1,000 mg by mouth daily. Take 3 pills daily         Social history:   Social History     Socioeconomic History    Marital status: Single   Occupational History    Occupation:    Tobacco Use    Smoking status: Never    Smokeless tobacco: Never   Vaping Use    Vaping status: Never Used   Substance and Sexual Activity    Alcohol use: Yes     Alcohol/week: 8.0 standard drinks of alcohol     Types: 4 Cans of beer, 4 Shots of liquor per week     Comment: social- last yesterday    Drug use: No    Sexual activity: Not Currently     Partners: Female        Family history:  Family History   Problem Relation Age of Onset    Diabetes Father     Hypertension Father     Heart Disease Father         Angina age 70s; CABG age 81    Colon Cancer Father     Other (Lung Cancer) Father     Other (Parkinson's) Mother     Other (Parkinson's) Maternal Aunt     Breast Cancer Paternal Grandmother     Cancer Paternal Grandmother         breast cancer        Review of Systems:   GENERAL: feels generally well  SKIN: no ulcerated or worrisome skin lesions  EYES:denies blurred vision or double vision  HEENT: denies new nasal congestion, sinus pain or ST  LUNGS: denies shortness of breath with exertion  CARDIOVASCULAR: denies chest pain on exertion  GI: no hematemesis, no BRBPR, no worsening heartburn  : no dysuria, no blood in urine, no difficulty urinating  MUSCULOSKELETAL: no new musculoskeletal complaints  NEURO: no persistent, recurrent  headaches  PSYCHE:no depression or anxiety  HEMATOLOGIC: no hx of blood dyscrasia  ENDOCRINE: no new endocrine problems  ALL/ASTHMA: no new hx of severe allergy or asthma  BACK: normal, no spinal deformity, no CVA tenderness    Physical examination:     Constitutional: appears well hydrated alert and responsive no acute distress noted  HEENT wnl, anicteric, PERRL,  normocephalic, atraumatic  Neck supple, norm ROM, no JVD  L CTA B  H Reg rate  Abd soft, NT, ND, no masses, mid abdominal hernia, > 5 cm, no HSM.  Extr no c/c/e  Skin intact, no jaundice, no rashes, no lesions  Neuro grossly intact, no focal deficits, no tremors  Back no deformity, no CVA tnd.         Assessment and plan:  Diagnoses and all orders for this visit:    Incisional hernia, without obstruction or gangrene       Plan incisional hernia repair with mesh.   We have discussed the surgical risks, benefits, alternatives, and expected recovery. All of the patient's questions have been answered to his satisfaction.        Anastasiya Sutton MD  2/27/2025  1:47 PM       [1]   Allergies  Allergen Reactions    Amoxicillin HIVES    Radiology Contrast Iodinated Dyes HIVES and JITTERY

## 2025-03-10 NOTE — DISCHARGE INSTRUCTIONS
HOME INSTRUCTIONS  Keep steri strips on, change gauze if saturated. If gauze remains dry and comfortable, may leave it on for a few days.   Diet, clear liquids then advance to a high fiber diet as tolerated .  Drink plenty of fluids.  Norco or Tylenol can be taken along with Ibuprofen.  Stool softeners and laxatives as needed.   Wear abdominal binder as much as possible, especially when ambulating.    No lifting > 10 lbs. Walking, stairs, normal activities as tolerated.   No driving.  See Angelito Turcios or Kristal MORE in approximately 2 wk,  May use ice packs liberally.   Shower starting tomorrow, no submersion in bath/hot tub/pool.  Call with any concerns.    _______________________________________________________   AMBSURG HOME CARE INSTRUCTIONS: POST-OP ANESTHESIA  The medication that you received for sedation or general anesthesia can last up to 24 hours. Your judgment and reflexes may be altered, even if you feel like your normal self.      We Recommend:   Do not drive any motor vehicle or bicycle   Avoid mowing the lawn, playing sports, or working with power tools/applicances (power saws, electric knives or mixers)   That you have someone stay with you on your first night home   Do not drink alcohol or take sleeping pills or tranquilizers   Do not sign legal documents within 24 hours of your procedure   If you had a nerve block for your surgery, take extra care not to put any pressure on your arm or hand for 24 hours    It is normal:  For you to have a sore throat if you had a breathing tube during surgery (while you were asleep!). The sore throat should get better within 48 hours. You can gargle with warm salt water (1/2 tsp in 4 oz warm water) or use a throat lozenge for comfort  To feel muscle aches or soreness especially in the abdomen, chest or neck. The achy feeling should go away in the next 24 hours  To feel weak, sleepy or \"wiped out\". Your should start feeling better in the next 24 hours.   To  experience mild discomforts such as sore lip or tongue, headache, cramps, gas pains or a bloated feeling in your abdomen.   To experience mild back pain or soreness for a day or two if you had spinal or epidural anesthesia.   If you had laparoscopic surgery, to feel shoulder pain or discomfort on the day of surgery.   For some patients to have nausea after surgery/anesthesia    If you feel nausea or experience vomiting:   Try to move around less.   Eat less than usual or drink only liquids until the next morning   Nausea should resolve in about 24 hours    If you have a problem when you are at home:    Call your surgeons office   Discharge Instructions: After Your Surgery  You’ve just had surgery. During surgery, you were given medicine called anesthesia to keep you relaxed and free of pain. After surgery, you may have some pain or nausea. This is common. Here are some tips for feeling better and getting well after surgery.   Going home  Your healthcare provider will show you how to take care of yourself when you go home. They'll also answer your questions. Have an adult family member or friend drive you home. For the first 24 hours after your surgery:   Don't drive or use heavy equipment.  Don't make important decisions or sign legal papers.  Take medicines as directed.  Don't drink alcohol.  Have someone stay with you, if needed. They can watch for problems and help keep you safe.  Be sure to go to all follow-up visits with your healthcare provider. And rest after your surgery for as long as your provider tells you to.   Coping with pain  If you have pain after surgery, pain medicine will help you feel better. Take it as directed, before pain becomes severe. Also, ask your healthcare provider or pharmacist about other ways to control pain. This might be with heat, ice, or relaxation. And follow any other instructions your surgeon or nurse gives you.      Stay on schedule with your medicine.     Tips for taking  pain medicine  To get the best relief possible, remember these points:   Pain medicines can upset your stomach. Taking them with a little food may help.  Most pain relievers taken by mouth need at least 20 to 30 minutes to start to work.  Don't wait till your pain becomes severe before you take your medicine. Try to time your medicine so that you can take it before starting an activity. This might be before you get dressed, go for a walk, or sit down for dinner.  Constipation is a common side effect of some pain medicines. Call your healthcare provider before taking any medicines such as laxatives or stool softeners to help ease constipation. Also ask if you should skip any foods. Drinking lots of fluids and eating foods such as fruits and vegetables that are high in fiber can also help. Remember, don't take laxatives unless your surgeon has prescribed them.  Drinking alcohol and taking pain medicine can cause dizziness and slow your breathing. It can even be deadly. Don't drink alcohol while taking pain medicine.  Pain medicine can make you react more slowly to things. Don't drive or run machinery while taking pain medicine.  Your healthcare provider may tell you to take acetaminophen to help ease your pain. Ask them how much you're supposed to take each day. Acetaminophen or other pain relievers may interact with your prescription medicines or other over-the-counter (OTC) medicines. Some prescription medicines have acetaminophen and other ingredients in them. Using both prescription and OTC acetaminophen for pain can cause you to accidentally overdose. Read the labels on your OTC medicines with care. This will help you to clearly know the list of ingredients, how much to take, and any warnings. It may also help you not take too much acetaminophen. If you have questions or don't understand the information, ask your pharmacist or healthcare provider to explain it to you before you take the OTC medicine.   Managing  nausea  Some people have an upset stomach (nausea) after surgery. This is often because of anesthesia, pain, or pain medicine, less movement of food in the stomach, or the stress of surgery. These tips will help you handle nausea and eat healthy foods as you get better. If you were on a special food plan before surgery, ask your healthcare provider if you should follow it while you get better. Check with your provider on how your eating should progress. It may depend on the surgery you had. These general tips may help:   Don't push yourself to eat. Your body will tell you when to eat and how much.  Start off with clear liquids and soup. They're easier to digest.  Next try semi-solid foods as you feel ready. These include mashed potatoes, applesauce, and gelatin.  Slowly move to solid foods. Don’t eat fatty, rich, or spicy foods at first.  Don't force yourself to have 3 large meals a day. Instead eat smaller amounts more often.  Take pain medicines with a small amount of solid food, such as crackers or toast. This helps prevent nausea.  When to call your healthcare provider  Call your healthcare provider right away if you have any of these:   You still have too much pain, or the pain gets worse, after taking the medicine. The medicine may not be strong enough. Or there may be a complication from the surgery.  You feel too sleepy, dizzy, or groggy. The medicine may be too strong.  Side effects such as nausea or vomiting. Your healthcare provider may advise taking other medicines to .  Skin changes such as rash, itching, or hives. This may mean you have an allergic reaction. Your provider may advise taking other medicines.  The incision looks different (for instance, part of it opens up).  Bleeding or fluid leaking from the incision site, and weren't told to expect that.  Fever of 100.4°F (38°C) or higher, or as directed by your provider.  Call 911  Call 911 right away if you have:   Trouble breathing  Facial  swelling    If you have obstructive sleep apnea   You were given anesthesia medicine during surgery to keep you comfortable and free of pain. After surgery, you may have more apnea spells because of this medicine and other medicines you were given. The spells may last longer than normal.    At home:  Keep using the continuous positive airway pressure (CPAP) device when you sleep. Unless your healthcare provider tells you not to, use it when you sleep, day or night. CPAP is a common device used to treat obstructive sleep apnea.  Talk with your provider before taking any pain medicine, muscle relaxants, or sedatives. Your provider will tell you about the possible dangers of taking these medicines.  Contact your provider if your sleeping changes a lot even when taking medicines as directed.  Keesha last reviewed this educational content on 10/1/2021  © 8363-8161 [employer]. All rights reserved. This information is not intended as a substitute for professional medical care. Always follow your healthcare professional's instructions.

## 2025-03-11 ENCOUNTER — HOSPITAL ENCOUNTER (OUTPATIENT)
Facility: HOSPITAL | Age: 65
Setting detail: HOSPITAL OUTPATIENT SURGERY
Discharge: HOME | End: 2025-03-11
Attending: SURGERY | Admitting: SURGERY
Payer: MEDICARE

## 2025-03-11 ENCOUNTER — ANESTHESIA (OUTPATIENT)
Dept: SURGERY | Facility: HOSPITAL | Age: 65
End: 2025-03-11
Payer: MEDICARE

## 2025-03-11 ENCOUNTER — ANESTHESIA EVENT (OUTPATIENT)
Dept: SURGERY | Facility: HOSPITAL | Age: 65
End: 2025-03-11
Payer: MEDICARE

## 2025-03-11 VITALS
HEIGHT: 72 IN | TEMPERATURE: 98 F | RESPIRATION RATE: 15 BRPM | OXYGEN SATURATION: 96 % | BODY MASS INDEX: 30.75 KG/M2 | DIASTOLIC BLOOD PRESSURE: 79 MMHG | WEIGHT: 227 LBS | SYSTOLIC BLOOD PRESSURE: 143 MMHG | HEART RATE: 65 BPM

## 2025-03-11 DIAGNOSIS — G89.18 POST-OP PAIN: Primary | ICD-10-CM

## 2025-03-11 PROCEDURE — 0WUF0JZ SUPPLEMENT ABDOMINAL WALL WITH SYNTHETIC SUBSTITUTE, OPEN APPROACH: ICD-10-PCS | Performed by: SURGERY

## 2025-03-11 PROCEDURE — 49594 RPR AA HRN 1ST 3-10 NCR/STRN: CPT | Performed by: SURGERY

## 2025-03-11 DEVICE — PHASIX ST MESH WITH OPEN POSITIONING SYSTEM, 11 CM (4.5"), CIRCLE
Type: IMPLANTABLE DEVICE | Site: ABDOMEN | Status: FUNCTIONAL
Brand: PHASIX

## 2025-03-11 RX ORDER — MORPHINE SULFATE 4 MG/ML
2 INJECTION, SOLUTION INTRAMUSCULAR; INTRAVENOUS EVERY 10 MIN PRN
Status: DISCONTINUED | OUTPATIENT
Start: 2025-03-11 | End: 2025-03-11

## 2025-03-11 RX ORDER — HYDROMORPHONE HYDROCHLORIDE 1 MG/ML
0.4 INJECTION, SOLUTION INTRAMUSCULAR; INTRAVENOUS; SUBCUTANEOUS EVERY 5 MIN PRN
Status: DISCONTINUED | OUTPATIENT
Start: 2025-03-11 | End: 2025-03-11

## 2025-03-11 RX ORDER — LABETALOL HYDROCHLORIDE 5 MG/ML
INJECTION, SOLUTION INTRAVENOUS AS NEEDED
Status: DISCONTINUED | OUTPATIENT
Start: 2025-03-11 | End: 2025-03-11 | Stop reason: SURG

## 2025-03-11 RX ORDER — ONDANSETRON 2 MG/ML
INJECTION INTRAMUSCULAR; INTRAVENOUS AS NEEDED
Status: DISCONTINUED | OUTPATIENT
Start: 2025-03-11 | End: 2025-03-11 | Stop reason: SURG

## 2025-03-11 RX ORDER — MORPHINE SULFATE 10 MG/ML
6 INJECTION, SOLUTION INTRAMUSCULAR; INTRAVENOUS EVERY 10 MIN PRN
Status: DISCONTINUED | OUTPATIENT
Start: 2025-03-11 | End: 2025-03-11

## 2025-03-11 RX ORDER — ROCURONIUM BROMIDE 10 MG/ML
INJECTION, SOLUTION INTRAVENOUS AS NEEDED
Status: DISCONTINUED | OUTPATIENT
Start: 2025-03-11 | End: 2025-03-11 | Stop reason: SURG

## 2025-03-11 RX ORDER — DEXAMETHASONE SODIUM PHOSPHATE 4 MG/ML
VIAL (ML) INJECTION AS NEEDED
Status: DISCONTINUED | OUTPATIENT
Start: 2025-03-11 | End: 2025-03-11 | Stop reason: SURG

## 2025-03-11 RX ORDER — METOCLOPRAMIDE HYDROCHLORIDE 5 MG/ML
10 INJECTION INTRAMUSCULAR; INTRAVENOUS ONCE
Status: COMPLETED | OUTPATIENT
Start: 2025-03-11 | End: 2025-03-11

## 2025-03-11 RX ORDER — HYDROMORPHONE HYDROCHLORIDE 1 MG/ML
0.2 INJECTION, SOLUTION INTRAMUSCULAR; INTRAVENOUS; SUBCUTANEOUS EVERY 5 MIN PRN
Status: DISCONTINUED | OUTPATIENT
Start: 2025-03-11 | End: 2025-03-11

## 2025-03-11 RX ORDER — IBUPROFEN 600 MG/1
600 TABLET, FILM COATED ORAL EVERY 8 HOURS PRN
Qty: 30 TABLET | Refills: 0 | Status: SHIPPED | OUTPATIENT
Start: 2025-03-11

## 2025-03-11 RX ORDER — MORPHINE SULFATE 4 MG/ML
4 INJECTION, SOLUTION INTRAMUSCULAR; INTRAVENOUS EVERY 10 MIN PRN
Status: DISCONTINUED | OUTPATIENT
Start: 2025-03-11 | End: 2025-03-11

## 2025-03-11 RX ORDER — FAMOTIDINE 20 MG/1
20 TABLET, FILM COATED ORAL ONCE
Status: COMPLETED | OUTPATIENT
Start: 2025-03-11 | End: 2025-03-11

## 2025-03-11 RX ORDER — HYDROMORPHONE HYDROCHLORIDE 1 MG/ML
0.6 INJECTION, SOLUTION INTRAMUSCULAR; INTRAVENOUS; SUBCUTANEOUS EVERY 5 MIN PRN
Status: DISCONTINUED | OUTPATIENT
Start: 2025-03-11 | End: 2025-03-11

## 2025-03-11 RX ORDER — SODIUM CHLORIDE, SODIUM LACTATE, POTASSIUM CHLORIDE, CALCIUM CHLORIDE 600; 310; 30; 20 MG/100ML; MG/100ML; MG/100ML; MG/100ML
INJECTION, SOLUTION INTRAVENOUS CONTINUOUS
Status: DISCONTINUED | OUTPATIENT
Start: 2025-03-11 | End: 2025-03-11

## 2025-03-11 RX ORDER — HYDROCODONE BITARTRATE AND ACETAMINOPHEN 5; 325 MG/1; MG/1
1 TABLET ORAL ONCE
Status: COMPLETED | OUTPATIENT
Start: 2025-03-11 | End: 2025-03-11

## 2025-03-11 RX ORDER — BUPIVACAINE HYDROCHLORIDE AND EPINEPHRINE 2.5; 5 MG/ML; UG/ML
INJECTION, SOLUTION INFILTRATION; PERINEURAL AS NEEDED
Status: DISCONTINUED | OUTPATIENT
Start: 2025-03-11 | End: 2025-03-11 | Stop reason: HOSPADM

## 2025-03-11 RX ORDER — FAMOTIDINE 10 MG/ML
20 INJECTION, SOLUTION INTRAVENOUS ONCE
Status: COMPLETED | OUTPATIENT
Start: 2025-03-11 | End: 2025-03-11

## 2025-03-11 RX ORDER — HYDROCODONE BITARTRATE AND ACETAMINOPHEN 5; 325 MG/1; MG/1
1 TABLET ORAL EVERY 6 HOURS PRN
Qty: 20 TABLET | Refills: 0 | Status: SHIPPED | OUTPATIENT
Start: 2025-03-11

## 2025-03-11 RX ORDER — LIDOCAINE HYDROCHLORIDE 10 MG/ML
INJECTION, SOLUTION EPIDURAL; INFILTRATION; INTRACAUDAL; PERINEURAL AS NEEDED
Status: DISCONTINUED | OUTPATIENT
Start: 2025-03-11 | End: 2025-03-11 | Stop reason: SURG

## 2025-03-11 RX ORDER — METOCLOPRAMIDE 10 MG/1
10 TABLET ORAL ONCE
Status: COMPLETED | OUTPATIENT
Start: 2025-03-11 | End: 2025-03-11

## 2025-03-11 RX ORDER — ACETAMINOPHEN 500 MG
1000 TABLET ORAL ONCE
Status: COMPLETED | OUTPATIENT
Start: 2025-03-11 | End: 2025-03-11

## 2025-03-11 RX ORDER — NALOXONE HYDROCHLORIDE 0.4 MG/ML
80 INJECTION, SOLUTION INTRAMUSCULAR; INTRAVENOUS; SUBCUTANEOUS AS NEEDED
Status: DISCONTINUED | OUTPATIENT
Start: 2025-03-11 | End: 2025-03-11

## 2025-03-11 RX ADMIN — ROCURONIUM BROMIDE 50 MG: 10 INJECTION, SOLUTION INTRAVENOUS at 11:51:00

## 2025-03-11 RX ADMIN — DEXAMETHASONE SODIUM PHOSPHATE 8 MG: 4 MG/ML VIAL (ML) INJECTION at 11:59:00

## 2025-03-11 RX ADMIN — LABETALOL HYDROCHLORIDE 5 MG: 5 INJECTION, SOLUTION INTRAVENOUS at 11:57:00

## 2025-03-11 RX ADMIN — LABETALOL HYDROCHLORIDE 5 MG: 5 INJECTION, SOLUTION INTRAVENOUS at 12:17:00

## 2025-03-11 RX ADMIN — ONDANSETRON 4 MG: 2 INJECTION INTRAMUSCULAR; INTRAVENOUS at 11:59:00

## 2025-03-11 RX ADMIN — LIDOCAINE HYDROCHLORIDE 100 MG: 10 INJECTION, SOLUTION EPIDURAL; INFILTRATION; INTRACAUDAL; PERINEURAL at 11:52:00

## 2025-03-11 NOTE — ANESTHESIA POSTPROCEDURE EVALUATION
Patient: Alejandro Lezama    Procedure Summary       Date: 03/11/25 Room / Location: [facility] MAIN OR 02 / [facility] MAIN OR    Anesthesia Start: 1144 Anesthesia Stop: 1314    Procedure: Incarcerated Incisional hernia repair with mesh Diagnosis:       Incisional hernia, without obstruction or gangrene      (Incisional hernia, without obstruction or gangrene [K43.2])    Surgeons: Ansatasiya Sutton MD Anesthesiologist: Loi Dean MD    Anesthesia Type: general ASA Status: 2            Anesthesia Type: general    Vitals Value Taken Time   /95 03/11/25 1313   Temp 98.2 03/11/25 1316   Pulse 63 03/11/25 1316   Resp 19 03/11/25 1316   SpO2 96 % 03/11/25 1316   Vitals shown include unfiled device data.    [facility] AN Post Evaluation:   Patient Evaluated in PACU  Patient Participation: complete - patient participated  Level of Consciousness: awake and alert  Pain Score: 0  Pain Management: adequate  Airway Patency:patent  Dental exam unchanged from preop  Yes    Nausea/Vomiting: none  Cardiovascular Status: blood pressure returned to baseline, hemodynamically stable and stable  Respiratory Status: acceptable  Postoperative Hydration acceptable      Kanwal Gomez CRNA  3/11/2025 1:16 PM

## 2025-03-11 NOTE — OPERATIVE REPORT
Pre-Operative Diagnosis: Incisional hernia, incarcerated     Post-Operative Diagnosis: Same     Procedure Performed:   Incarcerated Incisional hernia repair with mesh    Surgeons and Role:     * Anastasiya Sutton MD - Primary    Assistant(s):  Surgical Assistant.: Sunshine Koehler  PA: Kristal Cope PA     Surgical Findings: Incarcerated omentum     Specimen: none     Estimated Blood Loss: Blood Output: 5 mL (3/11/2025 12:51 PM)    Complication:  None    INDICATION:  Pt is a 64 year old male who has an enlarging, symptomatic Incisional hernia, without obstruction or gangrene [K43.2] who is scheduled for a Incarcerated Incisional hernia repair with mesh.    CONSENT:  An informed consent discussion was held with the patient regarding the nature of ventral/incisional hernias, the treatment options, expected outcomes, risks, benefits, complications, and expected recovery and restrictions. .  These risks include but are not limited to bleeding, wound infection, mesh infection, bowel injury, chronic abdominal pain and hernia recurrence.  The patient expressed understanding and agreed to proceed with an open ventral/incisional hernia repair with mesh.      TECHNIQUE:    The patient was taken to the operating room, placed in supine position, SCDs were applied and were functioning, the pt was placed under general endotracheal anesthesia.  IV antibiotic was administered within 1 hour of incision time and SCDs were placed for DVT prophylaxis.  The abdomen was prepped and draped in sterile fashion.  An Ioban was used to cover the skin. The skin and subcutaneous tissue was infiltrated with 0.5% marcaine.  An incision was made over the hernia and the subcutaneous tissue was divided using electrocautery.  The hernia contents were identified and freed from the subcutaneous tissue.  The hernia sac was reduced/excised and the fascia edge debrided.  The fascia defect measured approximately 4 by 5 cm.  The decision was made at  this time to fix the hernia using a  Phasix mesh as an underlay.  Four transfacial sutures of 0 vicryl were used to secure the mesh.  The SecureStrap tacker was used to fill the gap between the transfacial sutures.  The mesh laid flat as an underlay and the attenuated fascia was closed using 0 ethibond to completely cover the mesh.  The wound was irrigated with saline and found to be hemostatic.  The subcutaneous tissue and skin were closed using 3-0 and 4-0 vicryl.  Sterile dressing was applied and the patient was awaken from anesthesia.  All instruments and sponge counts were correct and I was present for the critical portions of the procedure.        Anastasiya Sutton M.D.

## 2025-03-11 NOTE — INTERVAL H&P NOTE
Pre-op Diagnosis: Incisional hernia, without obstruction or gangrene [K43.2]    The above referenced H&P was reviewed by Anastasiya Sutton MD on 3/11/2025, the patient was examined and no significant changes have occurred in the patient's condition since the H&P was performed.  I discussed with the patient and/or legal representative the potential benefits, risks and side effects of this procedure; the likelihood of the patient achieving goals; and potential problems that might occur during recuperation.  I discussed reasonable alternatives to the procedure, including risks, benefits and side effects related to the alternatives and risks related to not receiving this procedure.  We will proceed with procedure as planned.

## 2025-03-11 NOTE — ANESTHESIA PROCEDURE NOTES
Airway  Date/Time: 3/11/2025 11:54 AM  Urgency: Elective      General Information and Staff    Patient location during procedure: OR  Anesthesiologist: Loi Dean MD  Resident/CRNA: Kanwal Gomez CRNA  Performed: CRNA   Performed by: Kanwal Gomez CRNA  Authorized by: Loi Dean MD      Indications and Patient Condition  Indications for airway management: anesthesia  Sedation level: deep  Preoxygenated: yes  Patient position: sniffing  Mask difficulty assessment: 1 - vent by mask    Final Airway Details  Final airway type: endotracheal airway      Successful airway: ETT  Cuffed: yes   Successful intubation technique: direct laryngoscopy  Endotracheal tube insertion site: oral  Blade: GlideScope  Blade size: #4  ETT size (mm): 7.5    Cormack-Lehane Classification: grade IIA - partial view of glottis  Placement verified by: capnometry   Cuff volume (mL): 10  Measured from: teeth  ETT to teeth (cm): 23  Number of attempts at approach: 1  Ventilation between attempts: BVM  Number of other approaches attempted: 0

## 2025-03-11 NOTE — ANESTHESIA PREPROCEDURE EVALUATION
Anesthesia PreOp Note    HPI:     Alejandro Lezama is a 64 year old male who presents for preoperative consultation requested by: Anastasiya Sutton MD    Date of Surgery: 3/11/2025    Procedure(s):  Incisional hernia repair with mesh  Indication: Incisional hernia, without obstruction or gangrene [K43.2]    Relevant Problems   No relevant active problems       NPO:  Last Liquid Consumption Date: 03/10/25  Last Liquid Consumption Time: 2100  Last Solid Consumption Date: 03/10/25  Last Solid Consumption Time: 2100  Last Liquid Consumption Date: 03/10/25          History Review:  Patient Active Problem List    Diagnosis Date Noted    Acute appendicitis with localized peritonitis, without perforation, abscess, or gangrene 07/06/2024    Fever 12/20/2020    Fever, unspecified fever cause 12/20/2020    Hepatitis 12/20/2020    Acute cholecystitis 12/2020    Aortic atherosclerosis     Elevated LFTs     Generalized abdominal pain     Alcohol-induced acute pancreatitis (HCC) 11/05/2020    Alcohol-induced acute pancreatitis, unspecified complication status (HCC) 11/05/2020    History of acute pancreatitis 11/2020    Hepatic steatosis     Gastrointestinal hemorrhage 01/2019    Essential hypertension 11/30/2016    Duodenal ulcer with hemorrhage 07/27/2015    GERD (gastroesophageal reflux disease) 07/27/2015    Colon polyps 07/27/2015    Acute diverticulitis 07/27/2015    Dyslipidemia 07/27/2015       Past Medical History:    Acute cholecystitis    Acute cholecystitis with E. coli bacteremia Rx cholecystostomy tube; s/p laparoscopic cholecystectomy 1-21    Acute diverticulitis    Aortic atherosclerosis    CXR 11-20    Colon polyps    Duodenal ulcer with hemorrhage    EGD with H. pylori negative    Dyslipidemia    Essential hypertension    Gastritis    Gastrointestinal hemorrhage    Melena; EGD with small hiatal hernia, gastric polyps, mild gastritis, biopsies benign, H. pylori negative; colonoscopy with diverticulosis  and internal hemorrhoids    GERD (gastroesophageal reflux disease)    Hepatic steatosis    Ultrasound 1-19    High blood pressure    History of acute pancreatitis    Secondary to alcohol    Lyme disease    Erythema migrans, treated    Visual impairment    glasses       Past Surgical History:   Procedure Laterality Date    Appendectomy  2024    Colonoscopy  2010    4th colon; Dr. Whitley    Colonoscopy N/A 03/05/2018    Procedure: COLONOSCOPY;  Surgeon: Brian Whitley MD;  Location: [facility] ENDOSCOPY    Colonoscopy N/A 01/23/2019    Procedure: COLONOSCOPY;  Surgeon: Brian Whitley MD;  Location: [facility] ENDOSCOPY    Colonoscopy N/A 06/20/2024    MD Gutierrez, colonoscopy with polyp Bx x 2, diverticulosis    Colonoscopy N/A 06/20/2024    Procedure: COLONOSCOPYn with polyp biopsy;  Surgeon: Brian Whitley MD;  Location: [facility] ENDOSCOPY    Ir cholecystostomy  12/22/2020    Laparoscopic cholecystectomy  01/2021       Prescriptions Prior to Admission[1]  Current Medications and Prescriptions Ordered in Epic[2]    Allergies[3]    Family History   Problem Relation Age of Onset    Diabetes Father     Hypertension Father     Heart Disease Father         Angina age 70s; CABG age 81    Colon Cancer Father     Other (Lung Cancer) Father     Other (Parkinson's) Mother     Other (Parkinson's) Maternal Aunt     Breast Cancer Paternal Grandmother     Cancer Paternal Grandmother         breast cancer     Social History     Socioeconomic History    Marital status: Single   Occupational History    Occupation:    Tobacco Use    Smoking status: Never    Smokeless tobacco: Never   Vaping Use    Vaping status: Never Used   Substance and Sexual Activity    Alcohol use: Not Currently     Comment: 24 drinks a week    Drug use: No    Sexual activity: Not Currently     Partners: Female   Other Topics Concern    Caffeine Concern Yes     Comment: coffee, 2cups/day       Available pre-op labs reviewed.             Vital Signs:  Body  mass index is 30.79 kg/m².   height is 1.829 m (6') and weight is 103 kg (227 lb). His oral temperature is 98.5 °F (36.9 °C). His blood pressure is 154/91 (abnormal) and his pulse is 74. His respiration is 16 and oxygen saturation is 97%.   Vitals:    03/06/25 1050 03/11/25 1021   BP:  (!) 154/91   Pulse:  74   Resp:  16   Temp:  98.5 °F (36.9 °C)   TempSrc:  Oral   SpO2:  97%   Weight: 106.6 kg (235 lb) 103 kg (227 lb)   Height: 1.829 m (6')         Anesthesia Evaluation     Patient summary reviewed and Nursing notes reviewed    No history of anesthetic complications   Airway   Mallampati: II  Neck ROM: full  Dental      Pulmonary - negative ROS and normal exam   Cardiovascular - normal exam  (+) hypertension    Neuro/Psych - negative ROS     GI/Hepatic/Renal    (+) GERD, liver disease    Endo/Other - negative ROS   Abdominal  - normal exam  (+) obese                 Anesthesia Plan:   ASA:  2  Plan:   General  Airway:  ETT  Post-op Pain Management: IV analgesics  Informed Consent Plan and Risks Discussed With:  Patient  Discussed plan with:  CRNA      I have informed Alejandro Lezama and/or legal guardian or family member of the nature of the anesthetic plan, benefits, risks including possible dental damage if relevant, major complications, and any alternative forms of anesthetic management.   All of the patient's questions were answered to the best of my ability. The patient desires the anesthetic management as planned.  Loi Dean MD  3/11/2025 10:47 AM  Present on Admission:  **None**           [1]   Medications Prior to Admission   Medication Sig Dispense Refill Last Dose/Taking    lisinopril-hydroCHLOROthiazide 20-25 MG Oral Tab Take 1 tablet by mouth daily. 90 tablet 3 3/10/2025 at 10:00 AM    Multiple Vitamin (MULTI VITAMIN MENS) Oral Tab Take 1 tablet by mouth daily.   3/9/2025    omega-3 fatty acids 1000 MG Oral Cap Take 1,000 mg by mouth daily. Take 3 pills daily   Past Week   [2]    Current Facility-Administered Medications Ordered in Epic   Medication Dose Route Frequency Provider Last Rate Last Admin    lactated ringers infusion   Intravenous Continuous Anastasiya Sutton MD 20 mL/hr at 03/11/25 1036 New Bag at 03/11/25 1036    ceFAZolin (Ancef) 2g in 10mL IV syringe premix  2 g Intravenous Once Anastasiya Sutton MD         No current [facility]-ordered outpatient medications on file.   [3]   Allergies  Allergen Reactions    Amoxicillin HIVES     Denies blisters, skin peeling, and organ damage.     Radiology Contrast Iodinated Dyes HIVES and JITTERY

## 2025-03-26 ENCOUNTER — NURSE ONLY (OUTPATIENT)
Dept: SURGERY | Facility: CLINIC | Age: 65
End: 2025-03-26
Payer: MEDICARE

## 2025-03-26 VITALS
BODY MASS INDEX: 31 KG/M2 | HEART RATE: 97 BPM | DIASTOLIC BLOOD PRESSURE: 87 MMHG | SYSTOLIC BLOOD PRESSURE: 159 MMHG | WEIGHT: 227 LBS

## 2025-03-26 DIAGNOSIS — Z48.89 ENCOUNTER FOR POSTOPERATIVE CARE: Primary | ICD-10-CM

## 2025-03-26 PROCEDURE — 99212 OFFICE O/P EST SF 10 MIN: CPT

## 2025-03-26 NOTE — PROGRESS NOTES
S:  Alejandro Lezama is a 64 year old male sp ventral hernia repair with Dr. Sutton  Doing well, no fevers, no chills, tolerating a general diet, generally normal bowel movements, no calf tenderness nor lower extremity edema, no shortness of breath, no chest pain.       O:  /87 (BP Location: Right arm, Patient Position: Sitting, Cuff Size: large)   Pulse 97   Wt 227 lb (103 kg)   BMI 30.79 kg/m²   GEN:  No acute distress  L: nonlabored respirations  H: reg rate  Abd:  Soft, NT,ND.  Skin: Incision C D I, no eryth  Extr: No edema, no calf tenderness    Path:  Reviewed w pt    Assessment   1. Encounter for postoperative care        Doing well post op  Continue to avoid heavy lifting for another few weeks.  Continue to keep incision clean and dry.  Maintain a healthy diet.  Maintain good hydration.  F/u prn.         Angelito Turcios PA-C

## 2025-04-03 ENCOUNTER — TELEPHONE (OUTPATIENT)
Dept: INTERNAL MEDICINE CLINIC | Facility: CLINIC | Age: 65
End: 2025-04-03

## 2025-08-13 ENCOUNTER — HOSPITAL ENCOUNTER (OUTPATIENT)
Dept: GENERAL RADIOLOGY | Facility: HOSPITAL | Age: 65
Discharge: HOME | End: 2025-08-13
Attending: ORTHOPAEDIC SURGERY

## 2025-08-13 DIAGNOSIS — R52 PAIN: ICD-10-CM

## 2025-08-13 PROCEDURE — 73564 X-RAY EXAM KNEE 4 OR MORE: CPT | Performed by: ORTHOPAEDIC SURGERY

## 2025-08-14 ENCOUNTER — OFFICE VISIT (OUTPATIENT)
Facility: CLINIC | Age: 65
End: 2025-08-14

## 2025-08-14 VITALS — WEIGHT: 225 LBS | HEIGHT: 72 IN | BODY MASS INDEX: 30.48 KG/M2

## 2025-08-14 DIAGNOSIS — M23.92 ACUTE INTERNAL DERANGEMENT OF LEFT KNEE: Primary | ICD-10-CM

## (undated) DEVICE — POWER SHELL: Brand: SIGNIA

## (undated) DEVICE — SUTURE VICRYL 0

## (undated) DEVICE — GLOVE SUR 6.5 SENSICARE PI PIP GRN PWD F

## (undated) DEVICE — GIJAW SINGLE-USE BIOPSY FORCEPS WITH NEEDLE: Brand: GIJAW

## (undated) DEVICE — ARTICULATION RELOAD WITH TRI-STAPLE TECHNOLOGY: Brand: ENDO GIA

## (undated) DEVICE — Device: Brand: DEFENDO AIR/WATER/SUCTION AND BIOPSY VALVE

## (undated) DEVICE — LINE MNTR ADLT SET O2 INTMD

## (undated) DEVICE — INSUFFLATION NEEDLE TO ESTABLISH PNEUMOPERITONEUM.: Brand: INSUFFLATION NEEDLE

## (undated) DEVICE — TROCAR: Brand: KII FIOS FIRST ENTRY

## (undated) DEVICE — MAX-CORE® DISPOSABLE CORE BIOPSY INSTRUMENT, 18G X 20CM: Brand: MAX-CORE

## (undated) DEVICE — KIT CLEAN ENDOKIT 1.1OZ GOWNX2

## (undated) DEVICE — MEDI-VAC NON-CONDUCTIVE SUCTION TUBING: Brand: CARDINAL HEALTH

## (undated) DEVICE — ENDOSCOPY PACK - LOWER: Brand: MEDLINE INDUSTRIES, INC.

## (undated) DEVICE — SOLUTION IRRIG 1000ML 0.9% NACL USP BTL

## (undated) DEVICE — BLADE 11 SHRP BP SS SRG STRL

## (undated) DEVICE — TROCAR: Brand: KII SHIELDED BLADED ACCESS SYSTEM

## (undated) DEVICE — 9534HP TRANSPARENT DRSG W/FRAME: Brand: 3M™ TEGADERM™

## (undated) DEVICE — 5 MM BABCOCKS WITH RATCHET HANDLES: Brand: ENDOPATH

## (undated) DEVICE — GLOVE SUR 6.5 SENSICARE PI MIC PIP CRM PWD F

## (undated) DEVICE — SUT COAT VCRL 0 27IN CT-1 ABSRB VLT 36MM 1/2

## (undated) DEVICE — FORCEP RADIAL JAW 4

## (undated) DEVICE — SOL  .9 1000ML BTL

## (undated) DEVICE — GAMMEX® PI HYBRID SIZE 7.5, STERILE POWDER-FREE SURGICAL GLOVE, POLYISOPRENE AND NEOPRENE BLEND: Brand: GAMMEX

## (undated) DEVICE — Device: Brand: CUSTOM PROCEDURE KIT

## (undated) DEVICE — DEVICE FIX OPN ABSRB STRP SECURESTRAP

## (undated) DEVICE — GAUZE SPONGES,8 PLY: Brand: CURITY

## (undated) DEVICE — BINDER ABD L/XL H12XL62IN 4 PNL UNIV PREM

## (undated) DEVICE — 3M™ TEGADERM™ TRANSPARENT FILM DRESSING FRAME STYLE, 1626W, 4 IN X 4-3/4 IN (10 CM X 12 CM), 50/CT 4CT/CASE: Brand: 3M™ TEGADERM™

## (undated) DEVICE — UNDYED BRAIDED (POLYGLACTIN 910), SYNTHETIC ABSORBABLE SUTURE: Brand: COATED VICRYL

## (undated) DEVICE — TISSUE RETRIEVAL SYSTEM: Brand: INZII RETRIEVAL SYSTEM

## (undated) DEVICE — PLUMEPORT ACTIV LAPAROSCOPIC SMOKE FILTRATION DEVICE: Brand: PLUMEPORT ACTIVE

## (undated) DEVICE — SOLUTION IRRIG 1000ML ST H2O AQUALITE PLAS

## (undated) DEVICE — TROCARS: Brand: KII® BLUNT TIP ACCESS SYSTEM

## (undated) DEVICE — SUT ETHBND XL 0 30IN CT-1 NABSRB GRN 36MM 1/2

## (undated) DEVICE — DISPOSABLE SUCTION/IRRIGATOR TUBE SET: Brand: AHTO

## (undated) DEVICE — SUTURE VICRYL 0 UR-6

## (undated) DEVICE — SUTURE PASSOR WITH GUIDE

## (undated) DEVICE — MARYLAND JAW LAPAROSCOPIC SEALER/DIVIDER COATED: Brand: LIGASURE

## (undated) DEVICE — SOL  .9 1000ML BAG

## (undated) DEVICE — TROCARS: Brand: KII® BALLOON BLUNT TIP SYSTEM

## (undated) DEVICE — SUT COAT VCRL+ 0 27IN UR-6 ABSRB VLT ANTIBACT

## (undated) DEVICE — TROCAR: Brand: KII® SLEEVE

## (undated) DEVICE — ANTIBACTERIAL UNDYED BRAIDED (POLYGLACTIN 910), SYNTHETIC ABSORBABLE SUTURE: Brand: COATED VICRYL

## (undated) DEVICE — SYRINGE, LUER SLIP, STERILE, 60ML: Brand: MEDLINE

## (undated) DEVICE — LAP CHOLE: Brand: MEDLINE INDUSTRIES, INC.

## (undated) DEVICE — GAMMEX® PI HYBRID SIZE 7, STERILE POWDER-FREE SURGICAL GLOVE, POLYISOPRENE AND NEOPRENE BLEND: Brand: GAMMEX

## (undated) DEVICE — SOLUTION IV 1000ML 0.9% NACL PRESERVATIVE

## (undated) DEVICE — KIT ENDO ORCAPOD 160/180/190

## (undated) DEVICE — BLADE ELECTRODE: Brand: EDGE

## (undated) DEVICE — [HIGH FLOW INSUFFLATOR,  DO NOT USE IF PACKAGE IS DAMAGED,  KEEP DRY,  KEEP AWAY FROM SUNLIGHT,  PROTECT FROM HEAT AND RADIOACTIVE SOURCES.]: Brand: PNEUMOSURE

## (undated) DEVICE — LIGACLIP 10-M/L, 10MM ENDOSCOPIC ROTATING MULTIPLE CLIP APPLIERS: Brand: LIGACLIP

## (undated) DEVICE — Device: Brand: DUAL NARE NASAL CANNULAE FEMALE LUER CON 7FT O2 TUBE

## (undated) DEVICE — MINOR GENERAL: Brand: MEDLINE INDUSTRIES, INC.

## (undated) DEVICE — MEDI-VAC NON-CONDUCTIVE SUCTION TUBING 6MM X 1.8M (6FT.) L: Brand: CARDINAL HEALTH

## (undated) DEVICE — ADHESIVE SKIN TOP FOR WND CLSR DERMBND ADV

## (undated) DEVICE — 3M™ IOBAN™ 2 ANTIMICROBIAL INCISE DRAPE 6650EZ: Brand: IOBAN™ 2

## (undated) DEVICE — DRAPE,UTILITY,TAPE,15X26,STERILE: Brand: MEDLINE

## (undated) DEVICE — CONMED SCOPE SAVER BITE BLOCK, 20X27 MM: Brand: SCOPE SAVER

## (undated) DEVICE — SUTURE MONOCRYL 3-0 Y497G

## (undated) NOTE — LETTER
[address], [place]  Authorization for Invasive Procedures  1.  I hereby authorize Dr. Gabo Gibbs , my physician and whomever may be designated as the doctor's assistant, to perform the following operation and/or procedure:  Cholecystos 4. Should the need arise during my operation or immediate post-operative period; I also consent to the administration of blood and/or blood products.  Further, I understand that despite careful testing and screening of blood and blood products, I may still 9. Patients having a sterilization procedure: I understand that if the procedure is successful the results will be permanent and it will therefore be impossible for me to inseminate, conceive or bear children.  I also understand that the procedure is intend

## (undated) NOTE — LETTER
[address], [place]  Authorization for Invasive Procedures  1.  I hereby authorize Dr. Morena Carpio , my physician and whomever may be designated as the doctor's assistant, to perform the following operation and/or procedure Cholecystosto 4. Should the need arise during my operation or immediate post-operative period; I also consent to the administration of blood and/or blood products.  Further, I understand that despite careful testing and screening of blood and blood products, I may still 9. Patients having a sterilization procedure: I understand that if the procedure is successful the results will be permanent and it will therefore be impossible for me to inseminate, conceive or bear children.  I also understand that the procedure is intend

## (undated) NOTE — LETTER
05/06/21        [place] [id number]      Dear Cristin Maya records indicate that you have outstanding lab work and or testing that was ordered for you and has not yet been completed:     HEPATIC FUNCTION PAN

## (undated) NOTE — LETTER
11/20/2023    Marcelo Lezama        [address] [place] [address]            Dear Flo Craig,      Our records indicate that you are due for an appointment for a Colonoscopy with Kira Hazel MD. Our doctors are booking out about 3-6 months in advance for procedures. Please call our office to schedule a phone screening appointment to plan for the procedure(s). Your medical well-being is important to us. If your insurance requires a referral, please call your primary care office to request one.       Thank you,      The Physicians and Staff at [facility]

## (undated) NOTE — LETTER
[facility]  [address], [place], [place]    Authorization for Surgical Operation and Procedure                               I hereby authorize Anastasiya Sutton MD, my physician and his/her assistants (if applicable), which may include medical students, residents, and/or fellows, to perform the following surgical operation/ procedure and administer such anesthesia as may be determined necessary by my physician: Operation/Procedure name (s) LAPAROSCOPIC APPENDECTOMY, POSSIBLE OPEN on Alejandro Lezama   2.   I recognize that during the surgical operation/procedure, unforeseen conditions may necessitate additional or different procedures than those listed above.  I, therefore, further authorize and request that the above-named surgeon, assistants, or designees perform such procedures as are, in their judgment, necessary and desirable.    3.   My surgeon/physician has discussed prior to my surgery the potential benefits, risks and side effects of this procedure; the likelihood of achieving goals; and potential problems that might occur during recuperation.  They also discussed reasonable alternatives to the procedure, including risks, benefits, and side effects related to the alternatives and risks related to not receiving this procedure.  I have had all my questions answered and I acknowledge that no guarantee has been made as to the result that may be obtained.    4.   Should the need arise during my operation/procedure, which includes change of level of care prior to discharge, I also consent to the administration of blood and/or blood products.  Further, I understand that despite careful testing and screening of blood or blood products by collecting agencies, I may still be subject to ill effects as a result of receiving a blood transfusion and/or blood products.  The following are some, but not all, of the potential risks that can occur: fever and allergic reactions, hemolytic  reactions, transmission of diseases such as Hepatitis, AIDS and Cytomegalovirus (CMV) and fluid overload.  In the event that I wish to have an autologous transfusion of my own blood, or a directed donor transfusion, I will discuss this with my physician.  Check only if Refusing Blood or Blood Products  I understand refusal of blood or blood products as deemed necessary by my physician may have serious consequences to my condition to include possible death. I hereby assume responsibility for my refusal and release the hospital, its personnel, and my physicians from any responsibility for the consequences of my refusal.    o  Refuse   5.   I authorize the use of any specimen, organs, tissues, body parts or foreign objects that may be removed from my body during the operation/procedure for diagnosis, research or teaching purposes and their subsequent disposal by hospital authorities.  I also authorize the release of specimen test results and/or written reports to my treating physician on the hospital medical staff or other referring or consulting physicians involved in my care, at the discretion of the Pathologist or my treating physician.    6.   I consent to the photographing or videotaping of the operations or procedures to be performed, including appropriate portions of my body for medical, scientific, or educational purposes, provided my identity is not revealed by the pictures or by descriptive texts accompanying them.  If the procedure has been photographed/videotaped, the surgeon will obtain the original picture, image, videotape or CD.  The hospital will not be responsible for storage, release or maintenance of the picture, image, tape or CD.    7.   I consent to the presence of a  or observers in the operating room as deemed necessary by my physician or their designees.    8.   I recognize that in the event my procedure results in extended X-Ray/fluoroscopy time, I may develop a skin  reaction.    9. If I have a Do Not Attempt Resuscitation (DNAR) order in place, that status will be suspended while in the operating room, procedural suite, and during the recovery period unless otherwise explicitly stated by me (or a person authorized to consent on my behalf). The surgeon or my attending physician will determine when the applicable recovery period ends for purposes of reinstating the DNAR order.  10. Patients having a sterilization procedure: I understand that if the procedure is successful the results will be permanent and it will therefore be impossible for me to inseminate, conceive, or bear children.  I also understand that the procedure is intended to result in sterility, although the result has not been guaranteed.   11. I acknowledge that my physician has explained sedation/analgesia administration to me including the risk and benefits I consent to the administration of sedation/analgesia as may be necessary or desirable in the judgment of my physician.    I CERTIFY THAT I HAVE READ AND FULLY UNDERSTAND THE ABOVE CONSENT TO OPERATION and/or OTHER PROCEDURE.     ____________________________________  _________________________________        ______________________________  Signature of Patient    Signature of Responsible Person                Printed Name of Responsible Person                                      ____________________________________  _____________________________                ________________________________  Signature of Witness        Date  Time         Relationship to Patient    STATEMENT OF PHYSICIAN My signature below affirms that prior to the time of the procedure; I have explained to the patient and/or his/her legal representative, the risks and benefits involved in the proposed treatment and any reasonable alternative to the proposed treatment. I have also explained the risks and benefits involved in refusal of the proposed treatment and alternatives to the proposed  treatment and have answered the patient's questions. If I have a significant financial interest in a co-management agreement or a significant financial interest in any product or implant, or other significant relationship used in this procedure/surgery, I have disclosed this and had a discussion with my patient.     _____________________________________________________              _____________________________  (Signature of Physician)                                                                                         (Date)                                   (Time)  Patient Name: Alejandro Mitchell Teja      : 1960      Printed: 2024     Medical Record #: [id number]                                      Page 1 of 1

## (undated) NOTE — LETTER
[facility] [place] [address]    [facility],      This is the [facility], office of Dr. Vicky Aggarwal,     Thank you for putting your trust in [facility].  Our goal is to deliver the highest quality healthcare and an exceptional patient experience. Upon reviewing of your medical record shows you are due for the following:     Annual Physical  Blood pressure Follow-up     Please call [phone] to schedule your appointment or schedule online via [employer].     If you changed to a new provider at another facility, please notify the clinic to update your records.     If you had any recent testing at another facility, please have your results faxed to our office at [phone].      Thank you and have a great day!

## (undated) NOTE — LETTER
[facility]  [address], [place], [place]    Authorization for Surgical Operation and Procedure                               I hereby authorize Anastasiya Sutton MD, my physician and his/her assistants (if applicable), which may include medical students, residents, and/or fellows, to perform the following surgical operation/ procedure and administer such anesthesia as may be determined necessary by my physician: Operation/Procedure name (s) LAPAROSCOPIC APPENDECTOMY, POSSIBLE OPEN on Alejandro Lezama   2.   I recognize that during the surgical operation/procedure, unforeseen conditions may necessitate additional or different procedures than those listed above.  I, therefore, further authorize and request that the above-named surgeon, assistants, or designees perform such procedures as are, in their judgment, necessary and desirable.    3.   My surgeon/physician has discussed prior to my surgery the potential benefits, risks and side effects of this procedure; the likelihood of achieving goals; and potential problems that might occur during recuperation.  They also discussed reasonable alternatives to the procedure, including risks, benefits, and side effects related to the alternatives and risks related to not receiving this procedure.  I have had all my questions answered and I acknowledge that no guarantee has been made as to the result that may be obtained.    4.   Should the need arise during my operation/procedure, which includes change of level of care prior to discharge, I also consent to the administration of blood and/or blood products.  Further, I understand that despite careful testing and screening of blood or blood products by collecting agencies, I may still be subject to ill effects as a result of receiving a blood transfusion and/or blood products.  The following are some, but not all, of the potential risks that can occur: fever and allergic reactions, hemolytic  reactions, transmission of diseases such as Hepatitis, AIDS and Cytomegalovirus (CMV) and fluid overload.  In the event that I wish to have an autologous transfusion of my own blood, or a directed donor transfusion, I will discuss this with my physician.  Check only if Refusing Blood or Blood Products  I understand refusal of blood or blood products as deemed necessary by my physician may have serious consequences to my condition to include possible death. I hereby assume responsibility for my refusal and release the hospital, its personnel, and my physicians from any responsibility for the consequences of my refusal.    o  Refuse   5.   I authorize the use of any specimen, organs, tissues, body parts or foreign objects that may be removed from my body during the operation/procedure for diagnosis, research or teaching purposes and their subsequent disposal by hospital authorities.  I also authorize the release of specimen test results and/or written reports to my treating physician on the hospital medical staff or other referring or consulting physicians involved in my care, at the discretion of the Pathologist or my treating physician.    6.   I consent to the photographing or videotaping of the operations or procedures to be performed, including appropriate portions of my body for medical, scientific, or educational purposes, provided my identity is not revealed by the pictures or by descriptive texts accompanying them.  If the procedure has been photographed/videotaped, the surgeon will obtain the original picture, image, videotape or CD.  The hospital will not be responsible for storage, release or maintenance of the picture, image, tape or CD.    7.   I consent to the presence of a  or observers in the operating room as deemed necessary by my physician or their designees.    8.   I recognize that in the event my procedure results in extended X-Ray/fluoroscopy time, I may develop a skin  reaction.    9. If I have a Do Not Attempt Resuscitation (DNAR) order in place, that status will be suspended while in the operating room, procedural suite, and during the recovery period unless otherwise explicitly stated by me (or a person authorized to consent on my behalf). The surgeon or my attending physician will determine when the applicable recovery period ends for purposes of reinstating the DNAR order.  10. Patients having a sterilization procedure: I understand that if the procedure is successful the results will be permanent and it will therefore be impossible for me to inseminate, conceive, or bear children.  I also understand that the procedure is intended to result in sterility, although the result has not been guaranteed.   11. I acknowledge that my physician has explained sedation/analgesia administration to me including the risk and benefits I consent to the administration of sedation/analgesia as may be necessary or desirable in the judgment of my physician.    I CERTIFY THAT I HAVE READ AND FULLY UNDERSTAND THE ABOVE CONSENT TO OPERATION and/or OTHER PROCEDURE.     ____________________________________  _________________________________        ______________________________  Signature of Patient    Signature of Responsible Person                Printed Name of Responsible Person                                      ____________________________________  _____________________________                ________________________________  Signature of Witness        Date  Time         Relationship to Patient    STATEMENT OF PHYSICIAN My signature below affirms that prior to the time of the procedure; I have explained to the patient and/or his/her legal representative, the risks and benefits involved in the proposed treatment and any reasonable alternative to the proposed treatment. I have also explained the risks and benefits involved in refusal of the proposed treatment and alternatives to the proposed  treatment and have answered the patient's questions. If I have a significant financial interest in a co-management agreement or a significant financial interest in any product or implant, or other significant relationship used in this procedure/surgery, I have disclosed this and had a discussion with my patient.     _____________________________________________________              _____________________________  (Signature of Physician)                                                                                         (Date)                                   (Time)  Patient Name: Alejandro Mitchell Teja      : 1960      Printed: 2024     Medical Record #: [id number]                                      Page 1 of 1

## (undated) NOTE — LETTER
[facility]  Administration of Anesthesia  1. I, Pamela Boydo, or _________________________________ acting on his behalf, (Patient) (Dependent/Representative) request to receive anesthesia for my pending procedure/operation/treat infections, high spinal block, spinal bleeding, seizure, cardiac arrest and death. 7. AWARENESS: I understand that it is possible (but unlikely) to have explicit memory of events from the operating room while under general anesthesia.   8. ELECTROCONVULSIV unconscious pt /Relationship    My signature below affirms that prior to the time of the procedure, I have explained to the patient and/or his/her guardian, the risks and benefits of undergoing anesthesia, as well as any reasonable alternatives.     _______

## (undated) NOTE — LETTER
[facility]  [address], [place], [place]    Authorization for Surgical Operation and Procedure                               I hereby authorize Brian Whitley MD, my physician and his/her assistants (if applicable), which may include medical students, residents, and/or fellows, to perform the following surgical operation/ procedure and administer such anesthesia as may be determined necessary by my physician: Operation/Procedure name (s) COLONOSCOPY on Alejandro Lezama   2.   I recognize that during the surgical operation/procedure, unforeseen conditions may necessitate additional or different procedures than those listed above.  I, therefore, further authorize and request that the above-named surgeon, assistants, or designees perform such procedures as are, in their judgment, necessary and desirable.    3.   My surgeon/physician has discussed prior to my surgery the potential benefits, risks and side effects of this procedure; the likelihood of achieving goals; and potential problems that might occur during recuperation.  They also discussed reasonable alternatives to the procedure, including risks, benefits, and side effects related to the alternatives and risks related to not receiving this procedure.  I have had all my questions answered and I acknowledge that no guarantee has been made as to the result that may be obtained.    4.   Should the need arise during my operation/procedure, which includes change of level of care prior to discharge, I also consent to the administration of blood and/or blood products.  Further, I understand that despite careful testing and screening of blood or blood products by collecting agencies, I may still be subject to ill effects as a result of receiving a blood transfusion and/or blood products.  The following are some, but not all, of the potential risks that can occur: fever and allergic reactions, hemolytic reactions, transmission of  diseases such as Hepatitis, AIDS and Cytomegalovirus (CMV) and fluid overload.  In the event that I wish to have an autologous transfusion of my own blood, or a directed donor transfusion, I will discuss this with my physician.  Check only if Refusing Blood or Blood Products  I understand refusal of blood or blood products as deemed necessary by my physician may have serious consequences to my condition to include possible death. I hereby assume responsibility for my refusal and release the hospital, its personnel, and my physicians from any responsibility for the consequences of my refusal.    o  Refuse   5.   I authorize the use of any specimen, organs, tissues, body parts or foreign objects that may be removed from my body during the operation/procedure for diagnosis, research or teaching purposes and their subsequent disposal by hospital authorities.  I also authorize the release of specimen test results and/or written reports to my treating physician on the hospital medical staff or other referring or consulting physicians involved in my care, at the discretion of the Pathologist or my treating physician.    6.   I consent to the photographing or videotaping of the operations or procedures to be performed, including appropriate portions of my body for medical, scientific, or educational purposes, provided my identity is not revealed by the pictures or by descriptive texts accompanying them.  If the procedure has been photographed/videotaped, the surgeon will obtain the original picture, image, videotape or CD.  The hospital will not be responsible for storage, release or maintenance of the picture, image, tape or CD.    7.   I consent to the presence of a  or observers in the operating room as deemed necessary by my physician or their designees.    8.   I recognize that in the event my procedure results in extended X-Ray/fluoroscopy time, I may develop a skin reaction.    9. If I have a Do Not  Attempt Resuscitation (DNAR) order in place, that status will be suspended while in the operating room, procedural suite, and during the recovery period unless otherwise explicitly stated by me (or a person authorized to consent on my behalf). The surgeon or my attending physician will determine when the applicable recovery period ends for purposes of reinstating the DNAR order.  10. Patients having a sterilization procedure: I understand that if the procedure is successful the results will be permanent and it will therefore be impossible for me to inseminate, conceive, or bear children.  I also understand that the procedure is intended to result in sterility, although the result has not been guaranteed.   11. I acknowledge that my physician has explained sedation/analgesia administration to me including the risk and benefits I consent to the administration of sedation/analgesia as may be necessary or desirable in the judgment of my physician.    I CERTIFY THAT I HAVE READ AND FULLY UNDERSTAND THE ABOVE CONSENT TO OPERATION and/or OTHER PROCEDURE.     ____________________________________  _________________________________        ______________________________  Signature of Patient    Signature of Responsible Person                Printed Name of Responsible Person                                      ____________________________________  _____________________________                ________________________________  Signature of Witness        Date  Time         Relationship to Patient    STATEMENT OF PHYSICIAN My signature below affirms that prior to the time of the procedure; I have explained to the patient and/or his/her legal representative, the risks and benefits involved in the proposed treatment and any reasonable alternative to the proposed treatment. I have also explained the risks and benefits involved in refusal of the proposed treatment and alternatives to the proposed treatment and have answered the  patient's questions. If I have a significant financial interest in a co-management agreement or a significant financial interest in any product or implant, or other significant relationship used in this procedure/surgery, I have disclosed this and had a discussion with my patient.     _____________________________________________________              _____________________________  (Signature of Physician)                                                                                         (Date)                                   (Time)  Patient Name: Alejandro Mitchell Miguelchmidleigh      : 1960      Printed: 2024     Medical Record #: [id number]                                      Page 1 of 1

## (undated) NOTE — LETTER
[facility]  [address], [place], [place]    Authorization for Surgical Operation and Procedure                               I hereby authorize Anastasiya Sutotn MD, my physician and his/her assistants (if applicable), which may include medical students, residents, and/or fellows, to perform the following surgical operation/ procedure and administer such anesthesia as may be determined necessary by my physician: Operation/Procedure name (s) LAPAROSCOPIC APPENDECTOMY, POSSIBLE OPEN on Alejandro Lezama   2.   I recognize that during the surgical operation/procedure, unforeseen conditions may necessitate additional or different procedures than those listed above.  I, therefore, further authorize and request that the above-named surgeon, assistants, or designees perform such procedures as are, in their judgment, necessary and desirable.    3.   My surgeon/physician has discussed prior to my surgery the potential benefits, risks and side effects of this procedure; the likelihood of achieving goals; and potential problems that might occur during recuperation.  They also discussed reasonable alternatives to the procedure, including risks, benefits, and side effects related to the alternatives and risks related to not receiving this procedure.  I have had all my questions answered and I acknowledge that no guarantee has been made as to the result that may be obtained.    4.   Should the need arise during my operation/procedure, which includes change of level of care prior to discharge, I also consent to the administration of blood and/or blood products.  Further, I understand that despite careful testing and screening of blood or blood products by collecting agencies, I may still be subject to ill effects as a result of receiving a blood transfusion and/or blood products.  The following are some, but not all, of the potential risks that can occur: fever and allergic reactions, hemolytic  reactions, transmission of diseases such as Hepatitis, AIDS and Cytomegalovirus (CMV) and fluid overload.  In the event that I wish to have an autologous transfusion of my own blood, or a directed donor transfusion, I will discuss this with my physician.  Check only if Refusing Blood or Blood Products  I understand refusal of blood or blood products as deemed necessary by my physician may have serious consequences to my condition to include possible death. I hereby assume responsibility for my refusal and release the hospital, its personnel, and my physicians from any responsibility for the consequences of my refusal.    o  Refuse   5.   I authorize the use of any specimen, organs, tissues, body parts or foreign objects that may be removed from my body during the operation/procedure for diagnosis, research or teaching purposes and their subsequent disposal by hospital authorities.  I also authorize the release of specimen test results and/or written reports to my treating physician on the hospital medical staff or other referring or consulting physicians involved in my care, at the discretion of the Pathologist or my treating physician.    6.   I consent to the photographing or videotaping of the operations or procedures to be performed, including appropriate portions of my body for medical, scientific, or educational purposes, provided my identity is not revealed by the pictures or by descriptive texts accompanying them.  If the procedure has been photographed/videotaped, the surgeon will obtain the original picture, image, videotape or CD.  The hospital will not be responsible for storage, release or maintenance of the picture, image, tape or CD.    7.   I consent to the presence of a  or observers in the operating room as deemed necessary by my physician or their designees.    8.   I recognize that in the event my procedure results in extended X-Ray/fluoroscopy time, I may develop a skin  reaction.    9. If I have a Do Not Attempt Resuscitation (DNAR) order in place, that status will be suspended while in the operating room, procedural suite, and during the recovery period unless otherwise explicitly stated by me (or a person authorized to consent on my behalf). The surgeon or my attending physician will determine when the applicable recovery period ends for purposes of reinstating the DNAR order.  10. Patients having a sterilization procedure: I understand that if the procedure is successful the results will be permanent and it will therefore be impossible for me to inseminate, conceive, or bear children.  I also understand that the procedure is intended to result in sterility, although the result has not been guaranteed.   11. I acknowledge that my physician has explained sedation/analgesia administration to me including the risk and benefits I consent to the administration of sedation/analgesia as may be necessary or desirable in the judgment of my physician.    I CERTIFY THAT I HAVE READ AND FULLY UNDERSTAND THE ABOVE CONSENT TO OPERATION and/or OTHER PROCEDURE.     ____________________________________  _________________________________        ______________________________  Signature of Patient    Signature of Responsible Person                Printed Name of Responsible Person                                      ____________________________________  _____________________________                ________________________________  Signature of Witness        Date  Time         Relationship to Patient    STATEMENT OF PHYSICIAN My signature below affirms that prior to the time of the procedure; I have explained to the patient and/or his/her legal representative, the risks and benefits involved in the proposed treatment and any reasonable alternative to the proposed treatment. I have also explained the risks and benefits involved in refusal of the proposed treatment and alternatives to the proposed  treatment and have answered the patient's questions. If I have a significant financial interest in a co-management agreement or a significant financial interest in any product or implant, or other significant relationship used in this procedure/surgery, I have disclosed this and had a discussion with my patient.     _____________________________________________________              _____________________________  (Signature of Physician)                                                                                         (Date)                                   (Time)  Patient Name: Alejandro Mitchell Teja      : 1960      Printed: 2024     Medical Record #: [id number]                                      Page 1 of 1

## (undated) NOTE — LETTER
[facility]  Administration of Anesthesia  I, Alejandro Lezama agree to be cared for by a physician anesthesiologist alone and/or with a nurse anesthetist, who is specially trained to monitor me and give me medicine to put me to sleep or keep me comfortable during my procedure    I understand that my anesthesiologist and/or anesthetist is not an employee or agent of [facility] or [facility]. He or she works for [facility]    As the patient asking for anesthesia services, I agree to:  Allow the anesthesiologist (anesthesia doctor) to give me medicine and do additional procedures as necessary. Some examples are: Starting or using an “IV” to give me medicine, fluids or blood during my procedure, and having a breathing tube placed to help me breathe when I’m asleep (intubation). In the event that my heart stops working properly, I understand that my anesthesiologist will make every effort to sustain my life, unless otherwise directed by [facility] Do Not Resuscitate documents.  Tell my anesthesia doctor before my procedure:  If I am pregnant.  The last time that I ate or drank.  iii. All of the medicines I take (including prescriptions, herbal supplements, and pills I can buy without a prescription (including street drugs/illegal medications). Failure to inform my anesthesiologist about these medicines may increase my risk of anesthetic complications.  iv.If I am allergic to anything or have had a reaction to anesthesia before.  I understand how the anesthesia medicine will help me (benefits).  I understand that with any type of anesthesia medicine there are risks:  The most common risks are: nausea, vomiting, sore throat, muscle soreness, damage to my eyes, mouth, or teeth (from breathing tube placement).  Rare risks include: remembering what happened during my procedure, allergic reactions to medications, injury to my airway, heart, lungs, vision,  nerves, or muscles and in extremely rare instances death.  My doctor has explained to me other choices available to me for my care (alternatives).  Pregnant Patients (“epidural”):  I understand that the risks of having an epidural (medicine given into my back to help control pain during labor), include itching, low blood pressure, difficulty urinating, headache or slowing of the baby’s heart. Very rare risks include infection, bleeding, seizure, irregular heart rhythms and nerve injury.  Regional Anesthesia (“spinal”, “epidural”, & “nerve blocks”):  I understand that rare but potential complications include headache, bleeding, infection, seizure, irregular heart rhythms, and nerve injury.    _____________________________________________________________________________  Patient (or Representative) Signature/Relationship to Patient  Date   Time    _____________________________________________________________________________   Name (if used)    Language/Organization   Time    _____________________________________________________________________________  Nurse Anesthetist Signature     Date   Time  _____________________________________________________________________________  Anesthesiologist Signature     Date   Time  I have discussed the procedure and information above with the patient (or patient’s representative) and answered their questions. The patient or their representative has agreed to have anesthesia services.    _____________________________________________________________________________  Witness        Date   Time  I have verified that the signature is that of the patient or patient’s representative, and that it was signed before the procedure  Patient Name: Alejandro Lezama     : 1960                 Printed: 2024 at 7:16 AM    Medical Record #: [id number]                                            Page 1 of 1  ----------ANESTHESIA CONSENT----------

## (undated) NOTE — LETTER
[facility]  Administration of Anesthesia  I, Alejandro Lezama agree to be cared for by a physician anesthesiologist alone and/or with a nurse anesthetist, who is specially trained to monitor me and give me medicine to put me to sleep or keep me comfortable during my procedure    I understand that my anesthesiologist and/or anesthetist is not an employee or agent of [facility] or [employer]. He or she works for [facility]    As the patient asking for anesthesia services, I agree to:  Allow the anesthesiologist (anesthesia doctor) to give me medicine and do additional procedures as necessary. Some examples are: Starting or using an “IV” to give me medicine, fluids or blood during my procedure, and having a breathing tube placed to help me breathe when I’m asleep (intubation). In the event that my heart stops working properly, I understand that my anesthesiologist will make every effort to sustain my life, unless otherwise directed by [facility] Do Not Resuscitate documents.  Tell my anesthesia doctor before my procedure:  If I am pregnant.  The last time that I ate or drank.  iii. All of the medicines I take (including prescriptions, herbal supplements, and pills I can buy without a prescription (including street drugs/illegal medications). Failure to inform my anesthesiologist about these medicines may increase my risk of anesthetic complications.  iv.If I am allergic to anything or have had a reaction to anesthesia before.  I understand how the anesthesia medicine will help me (benefits).  I understand that with any type of anesthesia medicine there are risks:  The most common risks are: nausea, vomiting, sore throat, muscle soreness, damage to my eyes, mouth, or teeth (from breathing tube placement).  Rare risks include: remembering what happened during my procedure, allergic reactions to medications, injury to my airway, heart, lungs, vision,  nerves, or muscles and in extremely rare instances death.  My doctor has explained to me other choices available to me for my care (alternatives).  Pregnant Patients (“epidural”):  I understand that the risks of having an epidural (medicine given into my back to help control pain during labor), include itching, low blood pressure, difficulty urinating, headache or slowing of the baby’s heart. Very rare risks include infection, bleeding, seizure, irregular heart rhythms and nerve injury.  Regional Anesthesia (“spinal”, “epidural”, & “nerve blocks”):  I understand that rare but potential complications include headache, bleeding, infection, seizure, irregular heart rhythms, and nerve injury.    _____________________________________________________________________________  Patient (or Representative) Signature/Relationship to Patient  Date   Time    _____________________________________________________________________________   Name (if used)    Language/Organization   Time    _____________________________________________________________________________  Nurse Anesthetist Signature     Date   Time  _____________________________________________________________________________  Anesthesiologist Signature     Date   Time  I have discussed the procedure and information above with the patient (or patient’s representative) and answered their questions. The patient or their representative has agreed to have anesthesia services.    _____________________________________________________________________________  Witness        Date   Time  I have verified that the signature is that of the patient or patient’s representative, and that it was signed before the procedure  Patient Name: Alejandro Lezama     : 1960                 Printed: 2024 at 8:45 AM    Medical Record #: [id number]                                            Page 1 of 1  ----------ANESTHESIA CONSENT----------